# Patient Record
Sex: MALE | Race: WHITE | HISPANIC OR LATINO | Employment: OTHER | URBAN - METROPOLITAN AREA
[De-identification: names, ages, dates, MRNs, and addresses within clinical notes are randomized per-mention and may not be internally consistent; named-entity substitution may affect disease eponyms.]

---

## 2017-01-04 ENCOUNTER — RADIANT APPOINTMENT (OUTPATIENT)
Dept: GENERAL RADIOLOGY | Facility: CLINIC | Age: 27
End: 2017-01-04
Attending: FAMILY MEDICINE
Payer: COMMERCIAL

## 2017-01-04 ENCOUNTER — OFFICE VISIT (OUTPATIENT)
Dept: FAMILY MEDICINE | Facility: CLINIC | Age: 27
End: 2017-01-04
Payer: COMMERCIAL

## 2017-01-04 VITALS
SYSTOLIC BLOOD PRESSURE: 115 MMHG | TEMPERATURE: 98.4 F | HEART RATE: 84 BPM | DIASTOLIC BLOOD PRESSURE: 60 MMHG | WEIGHT: 188.5 LBS | HEIGHT: 72 IN | BODY MASS INDEX: 25.53 KG/M2 | OXYGEN SATURATION: 99 %

## 2017-01-04 DIAGNOSIS — M79.672 PAIN OF LEFT HEEL: ICD-10-CM

## 2017-01-04 DIAGNOSIS — F33.1 MAJOR DEPRESSIVE DISORDER, RECURRENT EPISODE, MODERATE (H): Primary | ICD-10-CM

## 2017-01-04 DIAGNOSIS — M76.62 ACHILLES TENDINITIS OF LEFT LOWER EXTREMITY: ICD-10-CM

## 2017-01-04 DIAGNOSIS — S99.922A FOOT INJURY, LEFT, INITIAL ENCOUNTER: ICD-10-CM

## 2017-01-04 DIAGNOSIS — M72.2 PLANTAR FASCIITIS: ICD-10-CM

## 2017-01-04 DIAGNOSIS — J45.30 UNCONTROLLED MILD PERSISTENT ASTHMA: ICD-10-CM

## 2017-01-04 PROCEDURE — 99214 OFFICE O/P EST MOD 30 MIN: CPT | Performed by: FAMILY MEDICINE

## 2017-01-04 PROCEDURE — 73630 X-RAY EXAM OF FOOT: CPT | Mod: LT

## 2017-01-04 RX ORDER — BUPROPION HYDROCHLORIDE 150 MG/1
150 TABLET ORAL EVERY MORNING
Qty: 90 TABLET | Refills: 0 | Status: SHIPPED | OUTPATIENT
Start: 2017-01-04 | End: 2017-05-26

## 2017-01-04 ASSESSMENT — ANXIETY QUESTIONNAIRES
5. BEING SO RESTLESS THAT IT IS HARD TO SIT STILL: MORE THAN HALF THE DAYS
1. FEELING NERVOUS, ANXIOUS, OR ON EDGE: NEARLY EVERY DAY
2. NOT BEING ABLE TO STOP OR CONTROL WORRYING: MORE THAN HALF THE DAYS
GAD7 TOTAL SCORE: 15
7. FEELING AFRAID AS IF SOMETHING AWFUL MIGHT HAPPEN: MORE THAN HALF THE DAYS
3. WORRYING TOO MUCH ABOUT DIFFERENT THINGS: MORE THAN HALF THE DAYS
6. BECOMING EASILY ANNOYED OR IRRITABLE: MORE THAN HALF THE DAYS
IF YOU CHECKED OFF ANY PROBLEMS ON THIS QUESTIONNAIRE, HOW DIFFICULT HAVE THESE PROBLEMS MADE IT FOR YOU TO DO YOUR WORK, TAKE CARE OF THINGS AT HOME, OR GET ALONG WITH OTHER PEOPLE: SOMEWHAT DIFFICULT

## 2017-01-04 ASSESSMENT — PATIENT HEALTH QUESTIONNAIRE - PHQ9: 5. POOR APPETITE OR OVEREATING: MORE THAN HALF THE DAYS

## 2017-01-04 NOTE — Clinical Note
My Depression Action Plan  Name: Godfrey Porras   Date of Birth 1990  Date: 1/4/2017    My doctor: Violeta Ayala   My clinic: [address] [address] [phone]          GREEN    ZONE   Good Control    What it looks like:     Things are going generally well. You have normal up s and down s. You may even feel depressed from time to time, but bad moods usually last less than a day.   What you need to do:  1. Continue to care for yourself (see self care plan)  2. Check your depression survival kit and update it as needed  3. Follow your physician s recommendations including any medication.  4. Do not stop taking medication unless you consult with your physician first.           YELLOW         ZONE Getting Worse    What it looks like:     Depression is starting to interfere with your life.     It may be hard to get out of bed; you may be starting to isolate yourself from others.    Symptoms of depression are starting to last most all day and this has happened for several days.     You may have suicidal thoughts but they are not constant.   What you need to do:     1. Call your care team, your response to treatment will improve if you keep your care team informed of your progress. Yellow periods are signs an adjustment may need to be made.     2. Continue your self-care, even if you have to fake it!    3. Talk to someone in your support network    4. Open up your depression survival kit           RED    ZONE Medical Alert - Get Help    What it looks like:     Depression is seriously interfering with your life.     You may experience these or other symptoms: You can t get out of bed most days, can t work or engage in other necessary activities, you have trouble taking care of basic hygiene, or basic responsibilities, thoughts of suicide or death that will not go away, self-injurious behavior.     What you need to do:  1. Call your care  team and request a same-day appointment. If they are not available (weekends or after hours) call your local crisis line, emergency room or 911.      Electronically signed by: Kiana Rdz, January 4, 2017    Depression Self Care Plan / Survival Kit    Self-Care for Depression  Here s the deal. Your body and mind are really not as separate as most people think.  What you do and think affects how you feel and how you feel influences what you do and think. This means if you do things that people who feel good do, it will help you feel better.  Sometimes this is all it takes.  There is also a place for medication and therapy depending on how severe your depression is, so be sure to consult with your medical provider and/ or Behavioral Health Consultant if your symptoms are worsening or not improving.     In order to better manage my stress, I will:    Exercise  Get some form of exercise, every day. This will help reduce pain and release endorphins, the  feel good  chemicals in your brain. This is almost as good as taking antidepressants!  This is not the same as joining a gym and then never going! (they count on that by the way ) It can be as simple as just going for a walk or doing some gardening, anything that will get you moving.      Hygiene   Maintain good hygiene (Get out of bed in the morning, Make your bed, Brush your teeth, Take a shower, and Get dressed like you were going to work, even if you are unemployed).  If your clothes don't fit try to get ones that do.    Diet  I will strive to eat foods that are good for me, drink plenty of water, and avoid excessive sugar, caffeine, alcohol, and other mood-altering substances.  Some foods that are helpful in depression are: complex carbohydrates, B vitamins, flaxseed, fish or fish oil, fresh fruits and vegetables.    Psychotherapy  I agree to participate in Individual Therapy (if recommended).    Medication  If prescribed medications, I agree to take them.  Missing  doses can result in serious side effects.  I understand that drinking alcohol, or other illicit drug use, may cause potential side effects.  I will not stop my medication abruptly without first discussing it with my provider.    Staying Connected With Others  I will stay in touch with my friends, family members, and my primary care provider/team.    Use your imagination  Be creative.  We all have a creative side; it doesn t matter if it s oil painting, sand castles, or mud pies! This will also kick up the endorphins.    Witness Beauty  (AKA stop and smell the roses) Take a look outside, even in mid-winter. Notice colors, textures. Watch the squirrels and birds.     Service to others  Be of service to others.  There is always someone else in need.  By helping others we can  get out of ourselves  and remember the really important things.  This also provides opportunities for practicing all the other parts of the program.    Humor  Laugh and be silly!  Adjust your TV habits for less news and crime-drama and more comedy.    Control your stress  Try breathing deep, massage therapy, biofeedback, and meditation. Find time to relax each day.     My support system    Clinic Contact:  Phone number:    Contact 1:  Phone number:    Contact 2:  Phone number:    Rastafarian/:  Phone number:    Therapist:  Phone number:    Local crisis center:    Phone number:    Other community support:  Phone number:

## 2017-01-04 NOTE — NURSING NOTE
"Chief Complaint   Patient presents with     Musculoskeletal Problem       Initial /60 mmHg  Pulse 84  Temp(Src) 98.4  F (36.9  C) (Oral)  Ht 5' 11.75\" (1.822 m)  Wt 188 lb 8 oz (85.503 kg)  BMI 25.76 kg/m2  SpO2 99% Estimated body mass index is 25.76 kg/(m^2) as calculated from the following:    Height as of this encounter: 5' 11.75\" (1.822 m).    Weight as of this encounter: 188 lb 8 oz (85.503 kg).  BP completed using cuff size: rakesh Rdz MA      "

## 2017-01-04 NOTE — Clinical Note
My Asthma Action Plan  Name: Godfrey Porras   YOB: 1990  Date: 1/4/2017   My doctor: Violeta Ayala   My clinic: [facility]      My Control Medicine: albuterol (ALBUTEROL) 108 (90 BASE) MCG/ACT inhaler       My Asthma Severity: mild persistent  Avoid your asthma triggers:         GREEN ZONE   Good Control    I feel good    No cough or wheeze    Can work, sleep and play without asthma symptoms       Take your asthma control medicine every day.     1. If exercise triggers your asthma, take your rescue medication    15 minutes before exercise or sports, and    During exercise if you have asthma symptoms  2. Spacer to use with inhaler: If you have a spacer, make sure to use it with your inhaler             YELLOW ZONE Getting Worse  I have ANY of these:    I do not feel good    Cough or wheeze    Chest feels tight    Wake up at night   1. Keep taking your Green Zone medications  2. Start taking your rescue medicine:    every 20 minutes for up to 1 hour. Then every 4 hours for 24-48 hours.  3. If you stay in the Yellow Zone for more than 12-24 hours, contact your doctor.  4. If you do not return to the Green Zone in 12-24 hours or you get worse, start taking your oral steroid medicine if prescribed by your provider.           RED ZONE Medical Alert - Get Help  I have ANY of these:    I feel awful    Medicine is not helping    Breathing getting harder    Trouble walking or talking    Nose opens wide to breathe       1. Take your rescue medicine NOW  2. If your provider has prescribed an oral steroid medicine, start taking it NOW  3. Call your doctor NOW  4. If you are still in the Red Zone after 20 minutes and you have not reached your doctor:    Take your rescue medicine again and    Call 911 or go to the emergency room right away    See your regular doctor within 2 weeks of an Emergency Room or Urgent Care visit for follow-up treatment.        The above medication may be given at  school or day care?: Yes and N/A (Adult Patient)  Child can carry and use inhaler(s) at school with approval of school nurse?: Yes and N/A (Adult Patient)    Electronically signed by: Kiana Rdz, January 4, 2017    Annual Reminders:  Meet with Asthma Educator,  Flu Shot in the Fall, consider Pneumonia Vaccination for patients with asthma (aged 19 and older).    Pharmacy:    [facility] [address] [place] [address] [address] AT [address] [facility] [place], [place] - [address]                    Asthma Triggers  How To Control Things That Make Your Asthma Worse    Triggers are things that make your asthma worse.  Look at the list below to help you find your triggers and what you can do about them.  You can help prevent asthma flare-ups by staying away from your triggers.      Trigger                                                          What you can do   Cigarette Smoke  Tobacco smoke can make asthma worse. Do not allow smoking in your home, car or around you.  Be sure no one smokes at a child s day care or school.  If you smoke, ask your health care provider for ways to help you quit.  Ask family members to quit too.  Ask your health care provider for a referral to Quit Plan to help you quit smoking, or call 1-806-700-PLAN.     Colds, Flu, Bronchitis  These are common triggers of asthma. Wash your hands often.  Don t touch your eyes, nose or mouth.  Get a flu shot every year.     Dust Mites  These are tiny bugs that live in cloth or carpet. They are too small to see. Wash sheets and blankets in hot water every week.   Encase pillows and mattress in dust mite proof covers.  Avoid having carpet if you can. If you have carpet, vacuum weekly.   Use a dust mask and HEPA vacuum.   Pollen and Outdoor Mold  Some people are allergic to trees, grass, or weed pollen, or molds. Try to keep your windows closed.  Limit time out doors when pollen count is high.   Ask you  health care provider about taking medicine during allergy season.     Animal Dander  Some people are allergic to skin flakes, urine or saliva from pets with fur or feathers. Keep pets with fur or feathers out of your home.    If you can t keep the pet outdoors, then keep the pet out of your bedroom.  Keep the bedroom door closed.  Keep pets off cloth furniture and away from stuffed toys.     Mice, Rats, and Cockroaches  Some people are allergic to the waste from these pests.   Cover food and garbage.  Clean up spills and food crumbs.  Store grease in the refrigerator.   Keep food out of the bedroom.   Indoor Mold  This can be a trigger if your home has high moisture. Fix leaking faucets, pipes, or other sources of water.   Clean moldy surfaces.  Dehumidify basement if it is damp and smelly.   Smoke, Strong Odors, and Sprays  These can reduce air quality. Stay away from strong odors and sprays, such as perfume, powder, hair spray, paints, smoke incense, paint, cleaning products, candles and new carpet.   Exercise or Sports  Some people with asthma have this trigger. Be active!  Ask your doctor about taking medicine before sports or exercise to prevent symptoms.    Warm up for 5-10 minutes before and after sports or exercise.     Other Triggers of Asthma  Cold air:  Cover your nose and mouth with a scarf.  Sometimes laughing or crying can be a trigger.  Some medicines and food can trigger asthma.

## 2017-01-04 NOTE — MR AVS SNAPSHOT
After Visit Summary   1/4/2017    Godfrey Porras    MRN: [id number]           Patient Information     Date Of Birth          1990        Visit Information        Provider Department      1/4/2017 3:40 PM Rufina Dyer MD [facility]        Today's Diagnoses     Major depressive disorder, recurrent episode, moderate (H)    -  1     Uncontrolled mild persistent asthma         Pain of left heel         Foot injury, left, initial encounter         Plantar fasciitis         Achilles tendinitis of left lower extremity           Care Instructions    Gentle stretches of foot and calf.    Ice foot and calf.  You can do ice massage using a frozen water bottle.      If not improving you should see Sports Med and/or Physical Therapy.          Follow-ups after your visit        Additional Services     PULMONARY MEDICINE REFERRAL       Your provider has referred you to: [facility]: [facility] [phone]   Or in-network pulmonary clinic near [place]  Please be aware that coverage of these services is subject to the terms and limitations of your health insurance plan.  Call member services at your health plan with any benefit or coverage questions.      Please bring the following with you to your appointment:    (1) Any X-Rays, CTs or MRIs which have been performed.  Contact the facility where they were done to arrange for  prior to your scheduled appointment.    (2) List of current medications   (3) This referral request   (4) Any documents/labs given to you for this referral                  Who to contact     If you have questions or need follow up information about today's clinic visit or your schedule please contact [facility] directly at [phone].  Normal or non-critical lab and imaging results will be communicated to you by MyChart, letter or phone within 4 business days after the clinic has received the results. If you do not hear from us  "within 7 days, please contact the clinic through [facility] or phone. If you have a critical or abnormal lab result, we will notify you by phone as soon as possible.  Submit refill requests through [facility] or call your pharmacy and they will forward the refill request to us. Please allow 3 business days for your refill to be completed.          Additional Information About Your Visit        [facility] Information     [facility] lets you send messages to your doctor, view your test results, renew your prescriptions, schedule appointments and more. To sign up, go to [email or web address] . Click on \"Log in\" on the left side of the screen, which will take you to the Welcome page. Then click on \"Sign up Now\" on the right side of the page.     You will be asked to enter the access code listed below, as well as some personal information. Please follow the directions to create your username and password.     Your access code is: [email or web address]  Expires: 2017  5:03 PM     Your access code will  in 90 days. If you need help or a new code, please call your [place] clinic or [phone].        Care EveryWhere ID     This is your Care EveryWhere ID. This could be used by other organizations to access your [place] medical records  [id number]        Your Vitals Were     Pulse Temperature Height BMI (Body Mass Index) Pulse Oximetry       84 98.4  F (36.9  C) (Oral) 5' 11.75\" (1.822 m) 25.76 kg/m2 99%        Blood Pressure from Last 3 Encounters:   17 115/60   16 102/62   16 104/62    Weight from Last 3 Encounters:   17 188 lb 8 oz (85.503 kg)   16 177 lb (80.287 kg)   16 174 lb (78.926 kg)              We Performed the Following     PULMONARY MEDICINE REFERRAL          Today's Medication Changes          These changes are accurate as of: 17  5:03 PM.  If you have any questions, ask your nurse or doctor.               These medicines have changed or have updated prescriptions.        " Dose/Directions    fluticasone 220 MCG/ACT Inhaler   Commonly known as:  FLOVENT HFA   This may have changed:  Another medication with the same name was removed. Continue taking this medication, and follow the directions you see here.   Used for:  Mild persistent asthma without complication   Changed by:  Violeta Ayala APRN CNP        Dose:  2 puff   Inhale 2 puffs into the lungs 2 times daily   Quantity:  1 Inhaler   Refills:  3            Where to get your medicines      These medications were sent to [facility] [address] [place] [address] AT [address] [address]    Hours:  24-hours Phone:  [phone]    - buPROPion 150 MG 24 hr tablet             Primary Care Provider Office Phone # Fax #    ROSA Garsia [phone]       [facility] [address]  [facility] [address]        Thank you!     Thank you for choosing [facility]  for your care. Our goal is always to provide you with excellent care. Hearing back from our patients is one way we can continue to improve our services. Please take a few minutes to complete the written survey that you may receive in the mail after your visit with us. Thank you!             Your Updated Medication List - Protect others around you: Learn how to safely use, store and throw away your medicines at www.disposemymeds.org.          This list is accurate as of: 1/4/17  5:03 PM.  Always use your most recent med list.                   Brand Name Dispense Instructions for use    albuterol 108 (90 BASE) MCG/ACT Inhaler    albuterol    1 Inhaler    Inhale 1-2 puffs into the lungs every 4 hours as needed for shortness of breath / dyspnea       buPROPion 150 MG 24 hr tablet    WELLBUTRIN XL    90 tablet    Take 1 tablet (150 mg) by mouth every morning       fluticasone 220 MCG/ACT Inhaler    FLOVENT HFA    1 Inhaler    Inhale 2 puffs  into the lungs 2 times daily

## 2017-01-04 NOTE — PROGRESS NOTES
"  SUBJECTIVE:                                                    Godfrey Porras is a 26 year old male who presents to clinic today for the following health issues:      Musculoskeletal problem/pain      Duration: 12/26/16    Description  Location: left foot    Intensity:  severe    Accompanying signs and symptoms: radiation of pain to bottom of heel to ankle    History  Previous similar problem: no  Previous evaluation:  x-ray    Precipitating or alleviating factors:  Trauma or overuse: no   Aggravating factors include: standing, walking and overuse    Therapies tried and outcome: Ibuprofen         Was jumping up while playing basketball and landed hard on left heel.  Next day went on 7 mile hike.  Now heel pain.    PROBLEMS TO ADD ON...  1) Depression.  Wants to start on Wellbutrin again.  Has used this in the past - most recently last winter.  Notes pattern of worsening mood in wintertime.  Has never used SAD lamp.   PHQ-9 SCORE 1/20/2016 2/24/2016 1/4/2017   Total Score - - -   Total Score 22 16 17     INDY-7 SCORE 1/20/2016 2/24/2016 1/4/2017   Total Score 15 8 15         2) Asthma Follow-Up  He has a longstanding history of mild persistent asthma but has not been using his flovent (controller).   Was ACT completed today?    Yes    ACT Total Scores 1/4/2017   ACT TOTAL SCORE -   ASTHMA ER VISITS -   ASTHMA HOSPITALIZATIONS -   ACT TOTAL SCORE (Goal Greater than or Equal to 20) 12   In the past 12 months, how many times did you visit the emergency room for your asthma without being admitted to the hospital? 0   In the past 12 months, how many times were you hospitalized overnight because of your asthma? 0           Problem list and histories reviewed & adjusted, as indicated.  Additional history: as documented        ROS:  PSYCH: No active SI.    OBJECTIVE:                                                    /60 mmHg  Pulse 84  Temp(Src) 98.4  F (36.9  C) (Oral)  Ht 5' 11.75\" (1.822 m)  Wt 188 lb 8 oz " (85.503 kg)  BMI 25.76 kg/m2  SpO2 99%  Body mass index is 25.76 kg/(m^2).  GEN:  no apparent distress  LUNGS:  normal respiratory effort, and lungs clear to auscultation bilaterally - no rales, rhonchi or wheezes  CV: regular rate and rhythm, normal S1 S2, no S3 or S4 and no murmur, click or rub  ANKLE:  left ankle with no swelling.  Tenderness to palpation over insertion of achilles tendon - which is intact.  No tenderness to palpation over malleoli.  FOOT:  left foot with no swelling, no warmth, no redness. no ecchymoses.  There is focal tenderness to palpation over the plantar aspect of calcaneus.  PSYCH:  Appearance/Behavior: patient is appropriately and casually dressed.  Speech:  normal  rate, rhythm, and tone.  Thought Form: organized.  Mood/Affect: depressed/flat.  Insight: fair.     Diagnostic Test Results:  FOOT LEFT THREE OR MORE VIEWS   1/4/2017 4:51 PM       HISTORY: Left foot pain.     COMPARISON: None.     FINDINGS: Negative.                                                                       IMPRESSION: Negative.     YESICA WELCH MD     ASSESSMENT/PLAN:                                                        1. Major depressive disorder, recurrent episode, moderate (H)  Will resume wellbutrin.  Discussed SAD and use of SAD lamp.  He will consider.  - buPROPion (WELLBUTRIN XL) 150 MG 24 hr tablet; Take 1 tablet (150 mg) by mouth every morning  Dispense: 90 tablet; Refill: 0    2. Uncontrolled mild persistent asthma  Discussed role of controller medicine and goal of minimizing need for rescue medication.  Recommended he see Pulmonary.  - PULMONARY MEDICINE REFERRAL    3. Pain of left heel  4. Foot injury, left, initial encounter  Reassured no fracture.   - XR Foot Left G/E 3 Views; Future    5. Plantar fasciitis  Reviewed pathophysiology and treatment.  Patient given instructions on stretching foot and posterior calf.  Reviewed stretching and icing.  Consider orthotics or night splints if more  conservative management fails.     6. Achilles tendinitis of left lower extremity  As above - rest and gentle stretching of posterior calf.  Recommended Sports Med eval is symptoms persist or worsen.      Rufina Dyer MD  [facility]

## 2017-01-04 NOTE — PATIENT INSTRUCTIONS
Gentle stretches of foot and calf.    Ice foot and calf.  You can do ice massage using a frozen water bottle.      If not improving you should see Sports Med and/or Physical Therapy.

## 2017-01-05 ASSESSMENT — ASTHMA QUESTIONNAIRES: ACT_TOTALSCORE: 12

## 2017-01-05 ASSESSMENT — PATIENT HEALTH QUESTIONNAIRE - PHQ9: SUM OF ALL RESPONSES TO PHQ QUESTIONS 1-9: 17

## 2017-01-05 ASSESSMENT — ANXIETY QUESTIONNAIRES: GAD7 TOTAL SCORE: 15

## 2017-01-19 DIAGNOSIS — J45.30 MILD PERSISTENT ASTHMA WITHOUT COMPLICATION: Primary | ICD-10-CM

## 2017-01-20 NOTE — TELEPHONE ENCOUNTER
albuterol (ALBUTEROL) 108 (90 BASE) MCG/ACT inhaler       Last Written Prescription Date: 8/20/16  Last Fill Quantity: 1 inh, # refills: 0    Last Office Visit with G, P or [facility] prescribing provider:  1/4/17   Future Office Visit:       Date of Last Asthma Action Plan Letter:   Asthma Action Plan Q1 Year    Topic Date Due     Asthma Action Plan - yearly  01/26/2017      Asthma Control Test:   ACT Total Scores 1/4/2017   ACT TOTAL SCORE -   ASTHMA ER VISITS -   ASTHMA HOSPITALIZATIONS -   ACT TOTAL SCORE (Goal Greater than or Equal to 20) 12   In the past 12 months, how many times did you visit the emergency room for your asthma without being admitted to the hospital? 0   In the past 12 months, how many times were you hospitalized overnight because of your asthma? 0       Date of Last Spirometry Test:   No results found for this or any previous visit.

## 2017-01-23 RX ORDER — ALBUTEROL SULFATE 90 UG/1
1-2 AEROSOL, METERED RESPIRATORY (INHALATION) EVERY 4 HOURS PRN
Qty: 1 INHALER | Refills: 1 | Status: SHIPPED | OUTPATIENT
Start: 2017-01-23 | End: 2017-03-12

## 2017-01-23 NOTE — TELEPHONE ENCOUNTER
From 1/4/17 ov-  2. Uncontrolled mild persistent asthma  Discussed role of controller medicine and goal of minimizing need for rescue medication.  Recommended he see Pulmonary.  - PULMONARY MEDICINE REFERRAL      Routing to most recent provider- AD.  ACT is low.  JOCELYNE Buchanan

## 2017-02-24 ENCOUNTER — TELEPHONE (OUTPATIENT)
Dept: FAMILY MEDICINE | Facility: CLINIC | Age: 27
End: 2017-02-24

## 2017-02-24 NOTE — TELEPHONE ENCOUNTER
Patient completed ACT will call back to schedule appointment with PCP to follow up, unable to schedule appointment at this time patient was on vacation and time was limited. ACT score 13.    Tamela Castellanos, CMA

## 2017-02-25 ASSESSMENT — ASTHMA QUESTIONNAIRES: ACT_TOTALSCORE: 13

## 2017-04-13 DIAGNOSIS — J45.30 MILD PERSISTENT ASTHMA WITHOUT COMPLICATION: ICD-10-CM

## 2017-04-13 NOTE — TELEPHONE ENCOUNTER
VENTOLIN  (90 BASE) MCG/ACT Inhaler       Last Written Prescription Date: 3/13/17  Last Fill Quantity: 18g, # refills: 0    Last Office Visit with [facility], P or [facility] prescribing provider:  3/17/17   Future Office Visit:       Date of Last Asthma Action Plan Letter:   Asthma Action Plan Q1 Year    Topic Date Due     Asthma Action Plan - yearly  01/26/2017      Asthma Control Test:   ACT Total Scores 2/24/2017   ACT TOTAL SCORE (Goal Greater than or Equal to 20) 13   In the past 12 months, how many times did you visit the emergency room for your asthma without being admitted to the hospital? 0   In the past 12 months, how many times were you hospitalized overnight because of your asthma? 0       Date of Last Spirometry Test:   No results found for this or any previous visit.

## 2017-04-14 NOTE — TELEPHONE ENCOUNTER
Per pt- Pt move down to [place], [place] and is seeing a different asthma doctor. Per pt request to put down the DO NOT CALL LIST.     Kiana Rdz MA

## 2017-04-18 RX ORDER — ALBUTEROL SULFATE 90 UG/1
AEROSOL, METERED RESPIRATORY (INHALATION)
Qty: 18 G | Refills: 0 | Status: SHIPPED | OUTPATIENT
Start: 2017-04-18 | End: 2017-05-26

## 2017-05-26 ENCOUNTER — OFFICE VISIT (OUTPATIENT)
Dept: FAMILY MEDICINE | Facility: CLINIC | Age: 27
End: 2017-05-26
Payer: COMMERCIAL

## 2017-05-26 VITALS
WEIGHT: 190 LBS | RESPIRATION RATE: 20 BRPM | TEMPERATURE: 98.5 F | OXYGEN SATURATION: 100 % | SYSTOLIC BLOOD PRESSURE: 109 MMHG | HEIGHT: 72 IN | HEART RATE: 80 BPM | BODY MASS INDEX: 25.73 KG/M2 | DIASTOLIC BLOOD PRESSURE: 65 MMHG

## 2017-05-26 DIAGNOSIS — F41.1 GAD (GENERALIZED ANXIETY DISORDER): ICD-10-CM

## 2017-05-26 DIAGNOSIS — F33.1 MAJOR DEPRESSIVE DISORDER, RECURRENT EPISODE, MODERATE (H): ICD-10-CM

## 2017-05-26 DIAGNOSIS — J45.30 MILD PERSISTENT ASTHMA WITHOUT COMPLICATION: ICD-10-CM

## 2017-05-26 PROCEDURE — 99214 OFFICE O/P EST MOD 30 MIN: CPT | Performed by: FAMILY MEDICINE

## 2017-05-26 RX ORDER — ALBUTEROL SULFATE 90 UG/1
2 AEROSOL, METERED RESPIRATORY (INHALATION) EVERY 4 HOURS PRN
Qty: 18 G | Refills: 1 | Status: SHIPPED | OUTPATIENT
Start: 2017-05-26 | End: 2017-08-04

## 2017-05-26 RX ORDER — BUPROPION HYDROCHLORIDE 150 MG/1
150 TABLET ORAL EVERY MORNING
Qty: 30 TABLET | Refills: 0 | Status: SHIPPED | OUTPATIENT
Start: 2017-05-26 | End: 2017-08-04

## 2017-05-26 ASSESSMENT — ANXIETY QUESTIONNAIRES
5. BEING SO RESTLESS THAT IT IS HARD TO SIT STILL: NOT AT ALL
4. TROUBLE RELAXING: NOT AT ALL
IF YOU CHECKED OFF ANY PROBLEMS ON THIS QUESTIONNAIRE, HOW DIFFICULT HAVE THESE PROBLEMS MADE IT FOR YOU TO DO YOUR WORK, TAKE CARE OF THINGS AT HOME, OR GET ALONG WITH OTHER PEOPLE: NOT DIFFICULT AT ALL
7. FEELING AFRAID AS IF SOMETHING AWFUL MIGHT HAPPEN: MORE THAN HALF THE DAYS
3. WORRYING TOO MUCH ABOUT DIFFERENT THINGS: SEVERAL DAYS
1. FEELING NERVOUS, ANXIOUS, OR ON EDGE: SEVERAL DAYS
GAD7 TOTAL SCORE: 8
6. BECOMING EASILY ANNOYED OR IRRITABLE: NEARLY EVERY DAY
2. NOT BEING ABLE TO STOP OR CONTROL WORRYING: SEVERAL DAYS

## 2017-05-26 NOTE — MR AVS SNAPSHOT
"              After Visit Summary   2017    Godfrey Porras    MRN: [id number]           Patient Information     Date Of Birth          1990        Visit Information        Provider Department      2017 12:00 PM Juanjo Ricks MD [facility]        Today's Diagnoses     Major depressive disorder, recurrent episode, moderate (H)        INDY (generalized anxiety disorder)        Mild persistent asthma without complication           Follow-ups after your visit        Who to contact     If you have questions or need follow up information about today's clinic visit or your schedule please contact [facility] directly at [phone].  Normal or non-critical lab and imaging results will be communicated to you by MyChart, letter or phone within 4 business days after the clinic has received the results. If you do not hear from us within 7 days, please contact the clinic through MyChart or phone. If you have a critical or abnormal lab result, we will notify you by phone as soon as possible.  Submit refill requests through Re.[email or web address] or call your pharmacy and they will forward the refill request to us. Please allow 3 business days for your refill to be completed.          Additional Information About Your Visit        MyChart Information     Re.[email or web address] lets you send messages to your doctor, view your test results, renew your prescriptions, schedule appointments and more. To sign up, go to [email or web address] . Click on \"Log in\" on the left side of the screen, which will take you to the Welcome page. Then click on \"Sign up Now\" on the right side of the page.     You will be asked to enter the access code listed below, as well as some personal information. Please follow the directions to create your username and password.     Your access code is: [email or web address]  Expires: 2017  2:47 PM     Your access code will  in 90 days. If you need help or a new code, please call your " [facility] or [phone].        Care EveryWhere ID     This is your Care EveryWhere ID. This could be used by other organizations to access your [place] medical records  [id number]        Your Vitals Were     Pulse Temperature Respirations Height Pulse Oximetry BMI (Body Mass Index)    80 98.5  F (36.9  C) (Oral) 20 6' (1.829 m) 100% 25.77 kg/m2       Blood Pressure from Last 3 Encounters:   05/26/17 109/65   01/04/17 115/60   05/26/16 102/62    Weight from Last 3 Encounters:   05/26/17 190 lb (86.2 kg)   01/04/17 188 lb 8 oz (85.5 kg)   05/26/16 177 lb (80.3 kg)              Today, you had the following     No orders found for display         Today's Medication Changes          These changes are accurate as of: 5/26/17  2:47 PM.  If you have any questions, ask your nurse or doctor.               These medicines have changed or have updated prescriptions.        Dose/Directions    albuterol 108 (90 BASE) MCG/ACT Inhaler   Commonly known as:  VENTOLIN HFA   This may have changed:  See the new instructions.   Used for:  Mild persistent asthma without complication   Changed by:  Juanjo Ricks MD        Dose:  2 puff   Inhale 2 puffs into the lungs every 4 hours as needed for shortness of breath / dyspnea or wheezing   Quantity:  18 g   Refills:  1            Where to get your medicines      These medications were sent to [facility] [address] [place] [address] AT [address] [address]    Hours:  24-hours Phone:  [phone]     albuterol 108 (90 BASE) MCG/ACT Inhaler    buPROPion 150 MG 24 hr tablet                Primary Care Provider    None Specified       No primary provider on file.        Thank you!     Thank you for choosing [facility]  for your care. Our goal is always to provide you with excellent care. Hearing back from our patients is one way we can continue to improve our services. Please take a  few minutes to complete the written survey that you may receive in the mail after your visit with us. Thank you!             Your Updated Medication List - Protect others around you: Learn how to safely use, store and throw away your medicines at www.disposemymeds.org.          This list is accurate as of: 5/26/17  2:47 PM.  Always use your most recent med list.                   Brand Name Dispense Instructions for use    albuterol 108 (90 BASE) MCG/ACT Inhaler    VENTOLIN HFA    18 g    Inhale 2 puffs into the lungs every 4 hours as needed for shortness of breath / dyspnea or wheezing       buPROPion 150 MG 24 hr tablet    WELLBUTRIN XL    30 tablet    Take 1 tablet (150 mg) by mouth every morning       fluticasone 220 MCG/ACT Inhaler    FLOVENT HFA    1 Inhaler    Inhale 2 puffs into the lungs 2 times daily

## 2017-05-26 NOTE — PROGRESS NOTES
SUBJECTIVE:                                                    Godfrey Porras is a 26 year old male who presents to clinic today for the following health issues:    Pt is out of albuterol inhaler. Today he ran out of albuterol inhaler. He feels that asthma is controlled, he is still using albuterol inhaler once/day prior to exercise. At times he wakes up in the morning, short of breath. He thinks that is due to allergies. For the last few days, he hasn't taken allergy medication. When he does take it, medication helps.   In August 2017, he is leaving to [place] for four months, previous to trip he will need refills.   MDD/INDY - Pt now has a job. No SI. He stopped taking Wellbutrin for one month, prior to that he was taking it for one month.    Left forms to fill out to extend insurance for 120 days.     Problem list and histories reviewed & adjusted, as indicated.  Additional history: as documented      Reviewed and updated as needed this visit by clinical staff       Reviewed and updated as needed this visit by Provider         ROS:  Constitutional, HEENT, cardiovascular, pulmonary, gi and gu systems are negative, except as otherwise noted.    OBJECTIVE:                                                    /65 (BP Location: Left arm, Patient Position: Chair, Cuff Size: Adult Large)  Pulse 80  Temp 98.5  F (36.9  C) (Oral)  Resp 20  Ht 6' (1.829 m)  Wt 190 lb (86.2 kg)  SpO2 100%  BMI 25.77 kg/m2  Body mass index is 25.77 kg/(m^2).  GENERAL: healthy, alert and no distress  EYES: Eyes grossly normal to inspection  HENT:  nose and mouth without ulcers or lesions  PSYCH: mentation appears normal, affect normal    Diagnostic Test Results:  none      ASSESSMENT/PLAN:                                                      ## Major depressive disorder, recurrent episode, moderate/INDY:  PHQ-9 SCORE 2/24/2016 1/4/2017 5/26/2017   Total Score - - -   Total Score 16 17 15     INDY-7 SCORE 2/24/2016 1/4/2017  5/26/2017   Total Score 8 15 8     - pt stopped medication, due to continued symptoms, I recommended restarting medication  - buPROPion (WELLBUTRIN XL) 150 MG 24 hr tablet; Take 1 tablet (150 mg) by mouth every morning  Dispense: 30 tablet; Refill: 0  - f/u in 2 weeks     ## Mild persistent asthma without complication  ACT Total Scores 1/4/2017 2/24/2017 5/26/2017   ACT TOTAL SCORE - - -   ASTHMA ER VISITS - - -   ASTHMA HOSPITALIZATIONS - - -   ACT TOTAL SCORE (Goal Greater than or Equal to 20) 12 13 12   In the past 12 months, how many times did you visit the emergency room for your asthma without being admitted to the hospital? 0 0 0   In the past 12 months, how many times were you hospitalized overnight because of your asthma? 0 0 0   - not controlled, refilled albuterol, repeat ACT in 1 month, if not controlled then will discuss starting controller medicaiton   - albuterol (VENTOLIN HFA) 108 (90 BASE) MCG/ACT Inhaler; Inhale 2 puffs into the lungs every 4 hours as needed for shortness of breath / dyspnea or wheezing  Dispense: 18 g; Refill: 1    Deqa Nicole Ricks MD  [facility]

## 2017-05-26 NOTE — NURSING NOTE
Chief Complaint   Patient presents with     Asthma     inhaler refill       Initial /65 (BP Location: Left arm, Patient Position: Chair, Cuff Size: Adult Large)  Pulse 80  Temp 98.5  F (36.9  C) (Oral)  Resp 20  Ht 6' (1.829 m)  Wt 190 lb (86.2 kg)  SpO2 100%  BMI 25.77 kg/m2 Estimated body mass index is 25.77 kg/(m^2) as calculated from the following:    Height as of this encounter: 6' (1.829 m).    Weight as of this encounter: 190 lb (86.2 kg).  Medication Reconciliation: incomplete     Kiana Rdz MA

## 2017-05-27 ASSESSMENT — ASTHMA QUESTIONNAIRES: ACT_TOTALSCORE: 12

## 2017-05-27 ASSESSMENT — ANXIETY QUESTIONNAIRES: GAD7 TOTAL SCORE: 8

## 2017-05-27 ASSESSMENT — PATIENT HEALTH QUESTIONNAIRE - PHQ9: SUM OF ALL RESPONSES TO PHQ QUESTIONS 1-9: 15

## 2017-06-23 ENCOUNTER — TELEPHONE (OUTPATIENT)
Dept: FAMILY MEDICINE | Facility: CLINIC | Age: 27
End: 2017-06-23

## 2017-06-27 ENCOUNTER — TELEPHONE (OUTPATIENT)
Dept: FAMILY MEDICINE | Facility: CLINIC | Age: 27
End: 2017-06-27

## 2017-06-27 NOTE — LETTER
[facility]   [address] [place]   [address]  [phone]    June 27, 2017      Godfrey Porras  [address]  [facility] [address]              Dear Diana Rodriguezmarielenahsanell,          I hope you are doing well.  Your doctor wanted me to check in with you to see how you are doing with your depression and  anxiety.  Please fill out the attached questionnaire(s) and send it back so we can assess the status of your health and prevent delays in any future refill requests.  If you do not wish to do the questionnaire, please schedule a visit with your provider.    To schedule an appointment or if you have any questions please call our clinic at [phone].              Thank you       Health Care Team

## 2017-07-11 ENCOUNTER — TELEPHONE (OUTPATIENT)
Dept: FAMILY MEDICINE | Facility: CLINIC | Age: 27
End: 2017-07-11

## 2017-08-03 PROBLEM — J45.40 MODERATE PERSISTENT ASTHMA WITHOUT COMPLICATION: Status: ACTIVE | Noted: 2017-08-03

## 2017-08-04 ENCOUNTER — OFFICE VISIT (OUTPATIENT)
Dept: FAMILY MEDICINE | Facility: CLINIC | Age: 27
End: 2017-08-04
Payer: MEDICAID

## 2017-08-04 ENCOUNTER — OFFICE VISIT (OUTPATIENT)
Dept: BEHAVIORAL HEALTH | Facility: CLINIC | Age: 27
End: 2017-08-04
Payer: MEDICAID

## 2017-08-04 VITALS
HEART RATE: 70 BPM | HEIGHT: 72 IN | TEMPERATURE: 98.6 F | SYSTOLIC BLOOD PRESSURE: 98 MMHG | DIASTOLIC BLOOD PRESSURE: 62 MMHG | WEIGHT: 190.5 LBS | OXYGEN SATURATION: 97 % | BODY MASS INDEX: 25.8 KG/M2

## 2017-08-04 DIAGNOSIS — J45.30 MILD PERSISTENT ASTHMA WITHOUT COMPLICATION: ICD-10-CM

## 2017-08-04 DIAGNOSIS — F33.1 MAJOR DEPRESSIVE DISORDER, RECURRENT EPISODE, MODERATE (H): Primary | ICD-10-CM

## 2017-08-04 DIAGNOSIS — R69 DIAGNOSIS DEFERRED: Primary | ICD-10-CM

## 2017-08-04 PROCEDURE — 99214 OFFICE O/P EST MOD 30 MIN: CPT | Performed by: FAMILY MEDICINE

## 2017-08-04 PROCEDURE — 99207 ZZC NO CHARGE LOS: CPT

## 2017-08-04 RX ORDER — ALBUTEROL SULFATE 90 UG/1
2 AEROSOL, METERED RESPIRATORY (INHALATION) EVERY 4 HOURS PRN
Qty: 54 G | Refills: 0 | Status: SHIPPED | OUTPATIENT
Start: 2017-08-04 | End: 2018-02-01

## 2017-08-04 ASSESSMENT — PATIENT HEALTH QUESTIONNAIRE - PHQ9
SUM OF ALL RESPONSES TO PHQ QUESTIONS 1-9: 20
SUM OF ALL RESPONSES TO PHQ QUESTIONS 1-9: 20
10. IF YOU CHECKED OFF ANY PROBLEMS, HOW DIFFICULT HAVE THESE PROBLEMS MADE IT FOR YOU TO DO YOUR WORK, TAKE CARE OF THINGS AT HOME, OR GET ALONG WITH OTHER PEOPLE: SOMEWHAT DIFFICULT
5. POOR APPETITE OR OVEREATING: SEVERAL DAYS

## 2017-08-04 ASSESSMENT — ANXIETY QUESTIONNAIRES
IF YOU CHECKED OFF ANY PROBLEMS ON THIS QUESTIONNAIRE, HOW DIFFICULT HAVE THESE PROBLEMS MADE IT FOR YOU TO DO YOUR WORK, TAKE CARE OF THINGS AT HOME, OR GET ALONG WITH OTHER PEOPLE: SOMEWHAT DIFFICULT
2. NOT BEING ABLE TO STOP OR CONTROL WORRYING: SEVERAL DAYS
7. FEELING AFRAID AS IF SOMETHING AWFUL MIGHT HAPPEN: SEVERAL DAYS
GAD7 TOTAL SCORE: 7
1. FEELING NERVOUS, ANXIOUS, OR ON EDGE: SEVERAL DAYS
5. BEING SO RESTLESS THAT IT IS HARD TO SIT STILL: SEVERAL DAYS
3. WORRYING TOO MUCH ABOUT DIFFERENT THINGS: SEVERAL DAYS
6. BECOMING EASILY ANNOYED OR IRRITABLE: SEVERAL DAYS

## 2017-08-04 NOTE — NURSING NOTE
"Chief Complaint   Patient presents with     Physical     pt is not fasting        Initial BP 98/62 (Cuff Size: Adult Large)  Pulse 70  Temp 98.6  F (37  C) (Oral)  Ht 5' 11.75\" (1.822 m)  Wt 190 lb 8 oz (86.4 kg)  SpO2 97%  BMI 26.02 kg/m2 Estimated body mass index is 26.02 kg/(m^2) as calculated from the following:    Height as of this encounter: 5' 11.75\" (1.822 m).    Weight as of this encounter: 190 lb 8 oz (86.4 kg).  Medication Reconciliation: complete     Malia Lane, VIKTOR      "

## 2017-08-04 NOTE — PROGRESS NOTES
[facility]  [facility]: [place]      Social Work Care Navigator Note      Patient: Godfrey Porras  Date: August 4, 2017  Preferred Name: Godfrey    Previous PHQ-9:   PHQ-9 SCORE 1/4/2017 5/26/2017 8/4/2017   Total Score - - -   Total Score MyChart - - 20 (Severe depression)   Total Score 17 15 20     Previous INDY-7:   INDY-7 SCORE 1/4/2017 5/26/2017 8/4/2017   Total Score 15 8 7     YASMANI LEVEL:  YASMANI Score (Last Two) 11/22/2011   YASMANI Raw Score 33   Activation Score 41.7   YASMANI Level 1       Preferred Contact:  No Data Recorded    Type of Contact Today: Face to Face in Clinic      Data: (subjective / Objective):    [facility] Introduction:  Hi my name is Dianna Bruce from your (name) primary care clinic.     I work closely with your primary care provider, No primary care provider on file..       SW met with pt in clinic to offer [facility] services, pcp consulted with sw stated this would be a good fit. 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        Patient response to [facility] Service offering:   Interested in enrolling in [facility] services and scheduled appt / will drop-in to complete the consent form and Brief Needs Assessment    Dianna Bruce Social Work Care Coordinator   ________________________________________________________________

## 2017-08-04 NOTE — PROGRESS NOTES
"SUBJECTIVE:   CC: Godfrey Porras is an 27 year old male who presents for preventative health visit.     Physical   Annual:     Getting at least 3 servings of Calcium per day::  Yes    Bi-annual eye exam::  Yes    Dental care twice a year::  NO    Sleep apnea or symptoms of sleep apnea::  None    Diet::  Regular (no restrictions)    Frequency of exercise::  2-3 days/week    Duration of exercise::  15-30 minutes    Taking medications regularly::  Yes    Medication side effects::  Not applicable    Additional concerns today::  No  Additional: form filled for going out of country for school.           {additional problems to add (Optional):988585}    Today's PHQ-2 Score: PHQ-2 ( 1999 [employer]) 8/4/2017   Q1: Little interest or pleasure in doing things 2   Q2: Feeling down, depressed or hopeless 2   PHQ-2 Score 4   Q1: Little interest or pleasure in doing things More than half the days   Q2: Feeling down, depressed or hopeless More than half the days   PHQ-2 Score 4       Abuse: Current or Past(Physical, Sexual or Emotional)- No  Do you feel safe in your environment - Yes    Social History   Substance Use Topics     Smoking status: Former Smoker     Packs/day: 1.00     Years: 5.00     Types: Cigarettes     Quit date: 4/20/2012     Smokeless tobacco: Never Used      Comment: 2.5 years     Alcohol use 1.0 - 1.5 oz/week     2 - 3 Standard drinks or equivalent per week      Comment: social, denies binge drinking or DWI     The patient does not drink >3 drinks per day nor >7 drinks per week.    Last PSA: No results found for: PSA    Reviewed orders with patient. Reviewed health maintenance and updated orders accordingly - Yes  {Chronicprobdata (Optional):374039}    Reviewed and updated as needed this visit by clinical staff         Reviewed and updated as needed this visit by Provider        {HISTORY OPTIONS (Optional):539849}    ROS:  {MALE ROS-adult preventive care package:502182::\"C: NEGATIVE for fever, chills, change in " "weight\",\"I: NEGATIVE for worrisome rashes, moles or lesions\",\"E: NEGATIVE for vision changes or irritation\",\"ENT: NEGATIVE for ear, mouth and throat problems\",\"R: NEGATIVE for significant cough or SOB\",\"CV: NEGATIVE for chest pain, palpitations or peripheral edema\",\"GI: NEGATIVE for nausea, abdominal pain, heartburn, or change in bowel habits\",\" male: negative for dysuria, hematuria, decreased urinary stream, erectile dysfunction, urethral discharge\",\"M: NEGATIVE for significant arthralgias or myalgia\",\"N: NEGATIVE for weakness, dizziness or paresthesias\",\"P: NEGATIVE for changes in mood or affect\"}    OBJECTIVE:   BP 98/62 (Cuff Size: Adult Large)  Pulse 70  Temp 98.6  F (37  C) (Oral)  Ht 5' 11.75\" (1.822 m)  Wt 190 lb 8 oz (86.4 kg)  SpO2 97%  BMI 26.02 kg/m2    EXAM:  {Exam Choices:052402}    ASSESSMENT/PLAN:   {Diag Picklist:606376}    COUNSELING:   {MALE COUNSELING MESSAGES:091325::\"Reviewed preventive health counseling, as reflected in patient instructions\"}    {BP Counseling- Complete if BP >= 120/80  (Optional):548759}     reports that he quit smoking about 5 years ago. His smoking use included Cigarettes. He has a 5.00 pack-year smoking history. He has never used smokeless tobacco.  {Tobacco Cessation -- Complete if patient is a smoker (Optional):781236}  Estimated body mass index is 25.77 kg/(m^2) as calculated from the following:    Height as of 5/26/17: 6' (1.829 m).    Weight as of 5/26/17: 190 lb (86.2 kg).   {Weight Management Plan (ACO) Complete if BMI is abnormal-  Ages 18-64  BMI >24.9.  Age 65+ with BMI <23 or >30 (Optional):682935}    Counseling Resources:  ATP IV Guidelines  Pooled Cohorts Equation Calculator  FRAX Risk Assessment  ICSI Preventive Guidelines  Dietary Guidelines for Americans, 2010  USDA's MyPlate  ASA Prophylaxis  Lung CA Screening    [facility]  Answers for HPI/ROS submitted by the patient on 8/4/2017   If you checked off any " problems, how difficult have these problems made it for you to do your work, take care of things at home, or get along with other people?: Somewhat difficult  PHQ9 TOTAL SCORE: 20    Conflicting answers have been found for some questions. Please document the patient's answers manually. ***    ***will be going for [employer] for 4 months - business program. Needs to go   Not currently having suicidal thoughts. No previous attempts.   Was given wellbutrin but not using it.   Not sleeping well. Working around 6 days per weeks. -----------------------------------  ***

## 2017-08-04 NOTE — MR AVS SNAPSHOT
After Visit Summary   8/4/2017    Godfrey Porras    MRN: [id number]           Patient Information     Date Of Birth          1990        Visit Information        Provider Department      8/4/2017 3:20 PM Michael Sarmiento MD [facility]        Today's Diagnoses     Major depressive disorder, recurrent episode, moderate (H)    -  1    Mild persistent asthma without complication          Care Instructions      Preventive Health Recommendations  Male Ages 26 - 39    Yearly exam:             See your health care provider every year in order to  o   Review health changes.   o   Discuss preventive care.    o   Review your medicines if your doctor has prescribed any.    You should be tested each year for STDs (sexually transmitted diseases), if you re at risk.     After age 35, talk to your provider about cholesterol testing. If you are at risk for heart disease, have your cholesterol tested at least every 5 years.     If you are at risk for diabetes, you should have a diabetes test (fasting glucose).  Shots: Get a flu shot each year. Get a tetanus shot every 10 years.     Nutrition:    Eat at least 5 servings of fruits and vegetables daily.     Eat whole-grain bread, whole-wheat pasta and brown rice instead of white grains and rice.     Talk to your provider about Calcium and Vitamin D.     Lifestyle    Exercise for at least 150 minutes a week (30 minutes a day, 5 days a week). This will help you control your weight and prevent disease.     Limit alcohol to one drink per day.     No smoking.     Wear sunscreen to prevent skin cancer.     See your dentist every six months for an exam and cleaning.             Follow-ups after your visit        Who to contact     If you have questions or need follow up information about today's clinic visit or your schedule please contact [facility] directly at [phone].  Normal or non-critical lab and imaging results will be  "communicated to you by Tararihart, letter or phone within 4 business days after the clinic has received the results. If you do not hear from us within 7 days, please contact the clinic through Lulu or phone. If you have a critical or abnormal lab result, we will notify you by phone as soon as possible.  Submit refill requests through Lulu or call your pharmacy and they will forward the refill request to us. Please allow 3 business days for your refill to be completed.          Additional Information About Your Visit        Lulu Information     Lulu lets you send messages to your doctor, view your test results, renew your prescriptions, schedule appointments and more. To sign up, go to [email or web address] . Click on \"Log in\" on the left side of the screen, which will take you to the Welcome page. Then click on \"Sign up Now\" on the right side of the page.     You will be asked to enter the access code listed below, as well as some personal information. Please follow the directions to create your username and password.     Your access code is: [email or web address]  Expires: 2017  2:47 PM     Your access code will  in 90 days. If you need help or a new code, please call your [place] clinic or [phone].        Care EveryWhere ID     This is your Care EveryWhere ID. This could be used by other organizations to access your [place] medical records  [id number]        Your Vitals Were     Pulse Temperature Height Pulse Oximetry BMI (Body Mass Index)       70 98.6  F (37  C) (Oral) 5' 11.75\" (1.822 m) 97% 26.02 kg/m2        Blood Pressure from Last 3 Encounters:   17 98/62   17 109/65   17 115/60    Weight from Last 3 Encounters:   17 190 lb 8 oz (86.4 kg)   17 190 lb (86.2 kg)   17 188 lb 8 oz (85.5 kg)              We Performed the Following     Asthma Action Plan (AAP)     DEPRESSION ACTION PLAN (DAP)          Today's Medication Changes          These changes are " accurate as of: 8/4/17 11:59 PM.  If you have any questions, ask your nurse or doctor.               Stop taking these medicines if you haven't already. Please contact your care team if you have questions.     buPROPion 150 MG 24 hr tablet   Commonly known as:  WELLBUTRIN XL   Stopped by:  Michael Sarmiento MD           fluticasone 220 MCG/ACT Inhaler   Commonly known as:  FLOVENT HFA   Stopped by:  Michael Sarmiento MD                Where to get your medicines      These medications were sent to [facility] [address]  [address], [facility] [address]     Phone:  [phone]     albuterol 108 (90 BASE) MCG/ACT Inhaler                Primary Care Provider    None Specified       No primary provider on file.        Equal Access to Services     [facility]: Hadhany Charles, wajamal lehman, rhea pricealortiz sullivan, huber falcon . [facility] [phone].    ATENCIÓN: Si habla español, tiene a chance disposición servicios gratuitos de asistencia lingüística. Terrie al [phone].    We comply with applicable federal civil rights laws and [place] laws. We do not discriminate on the basis of race, color, national origin, age, disability sex, sexual orientation or gender identity.            Thank you!     Thank you for choosing [facility]  for your care. Our goal is always to provide you with excellent care. Hearing back from our patients is one way we can continue to improve our services. Please take a few minutes to complete the written survey that you may receive in the mail after your visit with us. Thank you!             Your Updated Medication List - Protect others around you: Learn how to safely use, store and throw away your medicines at www.disposemymeds.org.          This list is accurate as of: 8/4/17 11:59 PM.  Always use your most recent med list.                   Brand Name Dispense  Instructions for use Diagnosis    albuterol 108 (90 BASE) MCG/ACT Inhaler    VENTOLIN HFA    54 g    Inhale 2 puffs into the lungs every 4 hours as needed for shortness of breath / dyspnea or wheezing    Mild persistent asthma without complication

## 2017-08-04 NOTE — LETTER
My Depression Action Plan  Name: Godfrey Porras   Date of Birth 1990  Date: 8/4/2017    My doctor: No primary care provider on file.   My clinic: [address] [address] [phone]          GREEN    ZONE   Good Control    What it looks like:     Things are going generally well. You have normal up s and down s. You may even feel depressed from time to time, but bad moods usually last less than a day.   What you need to do:  1. Continue to care for yourself (see self care plan)  2. Check your depression survival kit and update it as needed  3. Follow your physician s recommendations including any medication.  4. Do not stop taking medication unless you consult with your physician first.           YELLOW         ZONE Getting Worse    What it looks like:     Depression is starting to interfere with your life.     It may be hard to get out of bed; you may be starting to isolate yourself from others.    Symptoms of depression are starting to last most all day and this has happened for several days.     You may have suicidal thoughts but they are not constant.   What you need to do:     1. Call your care team, your response to treatment will improve if you keep your care team informed of your progress. Yellow periods are signs an adjustment may need to be made.     2. Continue your self-care, even if you have to fake it!    3. Talk to someone in your support network    4. Open up your depression survival kit           RED    ZONE Medical Alert - Get Help    What it looks like:     Depression is seriously interfering with your life.     You may experience these or other symptoms: You can t get out of bed most days, can t work or engage in other necessary activities, you have trouble taking care of basic hygiene, or basic responsibilities, thoughts of suicide or death that will not go away, self-injurious behavior.     What you need to do:  1. Call  your care team and request a same-day appointment. If they are not available (weekends or after hours) call your local crisis line, emergency room or 911.      Electronically signed by: Malia Lane, August 4, 2017    Depression Self Care Plan / Survival Kit    Self-Care for Depression  Here s the deal. Your body and mind are really not as separate as most people think.  What you do and think affects how you feel and how you feel influences what you do and think. This means if you do things that people who feel good do, it will help you feel better.  Sometimes this is all it takes.  There is also a place for medication and therapy depending on how severe your depression is, so be sure to consult with your medical provider and/ or Behavioral Health Consultant if your symptoms are worsening or not improving.     In order to better manage my stress, I will:    Exercise  Get some form of exercise, every day. This will help reduce pain and release endorphins, the  feel good  chemicals in your brain. This is almost as good as taking antidepressants!  This is not the same as joining a gym and then never going! (they count on that by the way ) It can be as simple as just going for a walk or doing some gardening, anything that will get you moving.      Hygiene   Maintain good hygiene (Get out of bed in the morning, Make your bed, Brush your teeth, Take a shower, and Get dressed like you were going to work, even if you are unemployed).  If your clothes don't fit try to get ones that do.    Diet  I will strive to eat foods that are good for me, drink plenty of water, and avoid excessive sugar, caffeine, alcohol, and other mood-altering substances.  Some foods that are helpful in depression are: complex carbohydrates, B vitamins, flaxseed, fish or fish oil, fresh fruits and vegetables.    Psychotherapy  I agree to participate in Individual Therapy (if recommended).    Medication  If prescribed medications, I agree to take  them.  Missing doses can result in serious side effects.  I understand that drinking alcohol, or other illicit drug use, may cause potential side effects.  I will not stop my medication abruptly without first discussing it with my provider.    Staying Connected With Others  I will stay in touch with my friends, family members, and my primary care provider/team.    Use your imagination  Be creative.  We all have a creative side; it doesn t matter if it s oil painting, sand castles, or mud pies! This will also kick up the endorphins.    Witness Beauty  (AKA stop and smell the roses) Take a look outside, even in mid-winter. Notice colors, textures. Watch the squirrels and birds.     Service to others  Be of service to others.  There is always someone else in need.  By helping others we can  get out of ourselves  and remember the really important things.  This also provides opportunities for practicing all the other parts of the program.    Humor  Laugh and be silly!  Adjust your TV habits for less news and crime-drama and more comedy.    Control your stress  Try breathing deep, massage therapy, biofeedback, and meditation. Find time to relax each day.     My support system    Clinic Contact:  Phone number:    Contact 1:  Phone number:    Contact 2:  Phone number:    Zoroastrianism/:  Phone number:    Therapist:  Phone number:    Local crisis center:    Phone number:    Other community support:  Phone number:

## 2017-08-05 ASSESSMENT — ANXIETY QUESTIONNAIRES: GAD7 TOTAL SCORE: 7

## 2017-08-05 ASSESSMENT — ASTHMA QUESTIONNAIRES: ACT_TOTALSCORE: 15

## 2017-08-05 ASSESSMENT — PATIENT HEALTH QUESTIONNAIRE - PHQ9: SUM OF ALL RESPONSES TO PHQ QUESTIONS 1-9: 20

## 2017-08-07 NOTE — PROGRESS NOTES
27 year old male here to fill out forms.   Initially presented for physical but feels physical is not necessary.   Will be going for [place] for 4 months - business program.   Admits mood is down. Feels it is work related stress. Working 6 days per week and not getting sleep at nigh time.   Not currently having suicidal thoughts. No previous attempts. Some passive thoughts of better off dead. No gun access. No plan or intention. Not seeing a therapist.   Was given wellbutrin but not using it.       Social History     Social History     Marital status: Single     Spouse name: N/A     Number of children: N/A     Years of education: N/A     Occupational History     student/ parking  Self Employed.     Social History Main Topics     Smoking status: Former Smoker     Packs/day: 1.00     Years: 5.00     Types: Cigarettes     Quit date: 4/20/2012     Smokeless tobacco: Never Used      Comment: 2.5 years     Alcohol use 1.0 - 1.5 oz/week     2 - 3 Standard drinks or equivalent per week      Comment: 2-3 drinks a week      Drug use: No      Comment: marijuana, occasional     Sexual activity: Yes     Partners: Female     Birth control/ protection: Pill, Condom     Other Topics Concern      Service No     Blood Transfusions No     Caffeine Concern No     Occupational Exposure No     Hobby Hazards No     Sleep Concern No     Stress Concern No     Weight Concern No     Special Diet No     Back Care No     Exercise No     Bike Helmet No     Seat Belt Yes     Self-Exams No     Parent/Sibling W/ Cabg, Mi Or Angioplasty Before 65f 55m? Yes     Maternal Grandpa- bypass heart surgery     Social History Narrative     Allergies   Allergen Reactions     Amoxicillin Rash     Patient Active Problem List   Diagnosis     ADD (attention deficit disorder) without hyperactivity     Marijuana use     Major depressive disorder, recurrent episode, moderate (H)     INDY (generalized anxiety disorder)     Moderate persistent asthma  "without complication     Reviewed medications, social history and  past medical and surgical history.    Review of system: for general, respiratory, CVS, GI and psychiatry negative except for noted above.     EXAM:  BP 98/62 (Cuff Size: Adult Large)  Pulse 70  Temp 98.6  F (37  C) (Oral)  Ht 5' 11.75\" (1.822 m)  Wt 190 lb 8 oz (86.4 kg)  SpO2 97%  BMI 26.02 kg/m2  Constitutional: healthy, alert and no distress   Psychiatric: mentation appears normal and affect normal/bright       ASSESSMENT / PLAN:   (F33.1) Major depressive disorder, recurrent episode, moderate (H)  (primary encounter diagnosis)  Comment: with passive suicidal thoughts. His forms is requesting opinion about his travel and if there is any physical or mental condition that needs evaluation.   Plan: disucsed I do feel like he should have further evaluation as I do not feel comfortable signing form while his depression is not under control, not using meds, not seeing therapist and having passive suicidal thoughts. Offered intervention. He is not too excited. 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 Filled out form stating he needs further evaluation about his mood. He agreed. Briefly seen by our  for [facility].     (J45.30) Mild persistent asthma without complication  Comment:  Refilled.   Plan: albuterol (VENTOLIN HFA) 108 (90 BASE) MCG/ACT         Inhaler                          "

## 2017-10-17 ENCOUNTER — TELEPHONE (OUTPATIENT)
Dept: FAMILY MEDICINE | Facility: CLINIC | Age: 27
End: 2017-10-17

## 2018-02-01 ENCOUNTER — OFFICE VISIT (OUTPATIENT)
Dept: FAMILY MEDICINE | Facility: CLINIC | Age: 28
End: 2018-02-01
Payer: COMMERCIAL

## 2018-02-01 VITALS
HEART RATE: 99 BPM | SYSTOLIC BLOOD PRESSURE: 108 MMHG | DIASTOLIC BLOOD PRESSURE: 64 MMHG | TEMPERATURE: 98.1 F | OXYGEN SATURATION: 100 % | BODY MASS INDEX: 26.49 KG/M2 | RESPIRATION RATE: 14 BRPM | WEIGHT: 194 LBS

## 2018-02-01 DIAGNOSIS — J45.31 MILD PERSISTENT ASTHMA WITH ACUTE EXACERBATION: Primary | ICD-10-CM

## 2018-02-01 DIAGNOSIS — Z91.09 ENVIRONMENTAL ALLERGIES: ICD-10-CM

## 2018-02-01 DIAGNOSIS — J45.30 MILD PERSISTENT ASTHMA WITHOUT COMPLICATION: ICD-10-CM

## 2018-02-01 PROBLEM — J45.40 MODERATE PERSISTENT ASTHMA WITHOUT COMPLICATION: Status: RESOLVED | Noted: 2017-08-03 | Resolved: 2018-02-01

## 2018-02-01 PROCEDURE — 82785 ASSAY OF IGE: CPT | Performed by: FAMILY MEDICINE

## 2018-02-01 PROCEDURE — 86003 ALLG SPEC IGE CRUDE XTRC EA: CPT | Performed by: FAMILY MEDICINE

## 2018-02-01 PROCEDURE — 36415 COLL VENOUS BLD VENIPUNCTURE: CPT | Performed by: FAMILY MEDICINE

## 2018-02-01 PROCEDURE — 99213 OFFICE O/P EST LOW 20 MIN: CPT | Performed by: FAMILY MEDICINE

## 2018-02-01 RX ORDER — ALBUTEROL SULFATE 90 UG/1
2 AEROSOL, METERED RESPIRATORY (INHALATION) EVERY 4 HOURS PRN
Qty: 54 G | Refills: 3 | Status: SHIPPED | OUTPATIENT
Start: 2018-02-01 | End: 2022-01-17

## 2018-02-01 RX ORDER — FLUTICASONE PROPIONATE 220 UG/1
2 AEROSOL, METERED RESPIRATORY (INHALATION) 2 TIMES DAILY
Qty: 1 INHALER | Refills: 3 | Status: SHIPPED | OUTPATIENT
Start: 2018-02-01 | End: 2022-01-17

## 2018-02-01 RX ORDER — FLUTICASONE PROPIONATE 50 MCG
1-2 SPRAY, SUSPENSION (ML) NASAL DAILY
Qty: 1 BOTTLE | Refills: 11 | Status: SHIPPED | OUTPATIENT
Start: 2018-02-01 | End: 2022-01-17

## 2018-02-01 NOTE — LETTER
February 7, 2018      Godfrey Porras  [address] [place] [address]        Dear ,    We are writing to inform you of your test results.    Hello!  It was a pleasure to see you in clinic!  Thank you for getting labs done.     Your allergy panel was positive for everything! This includes trees, grasses, pets, dust mites and mold. I'm not sure if allergy shots are an option for you given this, but it's certainly worth discussing with an allergist specialist.    Please follow up with the referrals as we discussed.    If you have any questions, please contact the clinic or schedule an appointment with me, thank you!    Resulted Orders   Respiratory disease profile   Result Value Ref Range    IGE 3114 (H) 0 - 114 KIU/L    Allergen Cat Dander 59.10 (H) <0.10 KU(A)/L      Comment:      Interp: Class v - Very High Response, Highly clinically relevant    Allergen Dog Dander 21.20 (H) <0.10 KU(A)/L      Comment:      Interp: Class IV - Very High Response, Highly clinically relevant    Allergen Bentgrass 18.40 (H) <0.10 KU(A)/L      Comment:      Interp: Class IV - Very High Response, Highly clinically relevant    Allergen Cocksfoot 15.10 (H) <0.10 KU(A)/L      Comment:      Interp: Class III - High Response, Clinically relevant    Allergen Cockroach 11.30 (H) <0.10 KU(A)/L      Comment:      Interp: Class III - High Response, Clinically relevant    Allergen D farinae 11.50 (H) <0.10 KU(A)/L      Comment:      Interp: Class III - High Response, Clinically relevant    Allergen A alternata 0.66 (H) <0.10 KU(A)/L      Comment:      Interp: Class I - Low Response, Uncertain clinical relevance    Allergen Elm 35.20 (H) <0.10 KU(A)/L      Comment:      Interp: Class IV - Very High Response, Highly clinically relevant    Allergen Maple 18.60 (H) <0.10 KU(A)/L      Comment:      Interp: Class IV - Very High Response, Highly clinically relevant    Allergen Oak(white) 20.20 (H) <0.10 KU(A)/L      Comment:       Interp: Class IV - Very High Response, Highly clinically relevant    Allergen, Silver Birch 50.80 (H) <0.10 KU(A)/L      Comment:      Interp: Class v - Very High Response, Highly clinically relevant    Allergen Marshelder 17.70 (H) <0.10 KU(A)/L      Comment:      Interp: Class IV - Very High Response, Highly clinically relevant    Allergen, Ragweed Short 19.10 (H) <0.10 KU(A)/L      Comment:      Interp: Class IV - Very High Response, Highly clinically relevant    Allergen, D Pteronyssinus 9.25 (H) <0.10 KU(A)/L      Comment:      Interp: Class III - High Response, Clinically relevant    Allergen C herbarum 0.25 (H) <0.10 KU(A)/L    Allergen A fumigatus 0.50 (H) <0.10 KU(A)/L      Comment:      Interp: Class I - Low Response, Uncertain clinical relevance       If you have any questions or concerns, please call the clinic at the number listed above.       Sincerely,        Sonia Isabel MD/nr

## 2018-02-01 NOTE — MR AVS SNAPSHOT
After Visit Summary   2/1/2018    Godfrey Porras    MRN: [id number]           Patient Information     Date Of Birth          1990        Visit Information        Provider Department      2/1/2018 3:20 PM Sonia Isabel MD [facility]        Today's Diagnoses     Mild persistent asthma without complication    -  1    Environmental allergies           Follow-ups after your visit        Additional Services     ALLERGY/ASTHMA ADULT REFERRAL       Your provider has referred you to: FMG: [facility]  [phone] [email or web address]  FMG: [facility] [phone]  [email or web address]  [facility] Allergy/Asthma-[facility] - [phone]  [email or web address]    FHN: [facility] [phone]   [email or web address]    Please be aware that coverage of these services is subject to the terms and limitations of your health insurance plan.  Call member services at your health plan with any benefit or coverage questions.      Please bring the following with you to your appointment:    (1) Any X-Rays, CTs or MRIs which have been performed.  Contact the facility where they were done to arrange for  prior to your scheduled appointment.    (2) List of current medications  (3) This referral request   (4) Any documents/labs given to you for this referral                  Who to contact     If you have questions or need follow up information about today's clinic visit or your schedule please contact [facility] directly at [phone].  Normal or non-critical lab and imaging results will be communicated to you by MyChart, letter or phone within 4 business days after the clinic has received the results. If you do not hear from us within 7 days, please contact the clinic through [employer] or  "phone. If you have a critical or abnormal lab result, we will notify you by phone as soon as possible.  Submit refill requests through [employer] or call your pharmacy and they will forward the refill request to us. Please allow 3 business days for your refill to be completed.          Additional Information About Your Visit        [employer] Information     [employer] lets you send messages to your doctor, view your test results, renew your prescriptions, schedule appointments and more. To sign up, go to [email or web address] . Click on \"Log in\" on the left side of the screen, which will take you to the Welcome page. Then click on \"Sign up Now\" on the right side of the page.     You will be asked to enter the access code listed below, as well as some personal information. Please follow the directions to create your username and password.     Your access code is: [email or web address]  Expires: 2018  3:54 PM     Your access code will  in 90 days. If you need help or a new code, please call your [place] clinic or [phone].        Care EveryWhere ID     This is your Care EveryWhere ID. This could be used by other organizations to access your [place] medical records  [id number]        Your Vitals Were     Pulse Temperature Respirations Pulse Oximetry BMI (Body Mass Index)       99 98.1  F (36.7  C) (Oral) 14 100% 26.49 kg/m2        Blood Pressure from Last 3 Encounters:   18 108/64   17 98/62   17 109/65    Weight from Last 3 Encounters:   18 194 lb (88 kg)   17 190 lb 8 oz (86.4 kg)   17 190 lb (86.2 kg)              We Performed the Following     ALLERGY/ASTHMA ADULT REFERRAL     Respiratory disease profile          Today's Medication Changes          These changes are accurate as of 18  3:54 PM.  If you have any questions, ask your nurse or doctor.               Start taking these medicines.        Dose/Directions    fluticasone 220 MCG/ACT Inhaler   Commonly known as:  " FLOVENT HFA   Used for:  Mild persistent asthma without complication   Started by:  Sonia Isabel MD        Dose:  2 puff   Inhale 2 puffs into the lungs 2 times daily   Quantity:  1 Inhaler   Refills:  3       fluticasone 50 MCG/ACT spray   Commonly known as:  FLONASE   Used for:  Environmental allergies   Started by:  Sonia Isabel MD        Dose:  1-2 spray   Spray 1-2 sprays into both nostrils daily   Quantity:  1 Bottle   Refills:  11            Where to get your medicines      These medications were sent to [facility] [address]  [address], [facility] [address]     Phone:  [phone]     albuterol 108 (90 BASE) MCG/ACT Inhaler    fluticasone 220 MCG/ACT Inhaler    fluticasone 50 MCG/ACT spray                Primary Care Provider Office Phone # Fax #    Sonia Isabel [phone]       [address]  [facility] [address]        Equal Access to Services     Sharp Mesa VistaFABIEN : Hadii aad ku hadasho Soomaali, waaxda luqadaha, qaybta kaalmada adeegyada, waxay idiin hayflorn nghia falcon . So [facility] [phone].    ATENCIÓN: Si habla español, tiene a chance disposición servicios gratuitos de asistencia lingüística. Llame al [phone].    We comply with applicable federal civil rights laws and [place] laws. We do not discriminate on the basis of race, color, national origin, age, disability, sex, sexual orientation, or gender identity.            Thank you!     Thank you for choosing [facility]  for your care. Our goal is always to provide you with excellent care. Hearing back from our patients is one way we can continue to improve our services. Please take a few minutes to complete the written survey that you may receive in the mail after your visit with us. Thank you!             Your Updated Medication List - Protect others around you: Learn how to safely use, store and throw away your medicines at  www.disposemymeds.org.          This list is accurate as of 2/1/18  3:54 PM.  Always use your most recent med list.                   Brand Name Dispense Instructions for use Diagnosis    albuterol 108 (90 BASE) MCG/ACT Inhaler    VENTOLIN HFA    54 g    Inhale 2 puffs into the lungs every 4 hours as needed for shortness of breath / dyspnea or wheezing    Mild persistent asthma without complication       fluticasone 220 MCG/ACT Inhaler    FLOVENT HFA    1 Inhaler    Inhale 2 puffs into the lungs 2 times daily    Mild persistent asthma without complication       fluticasone 50 MCG/ACT spray    FLONASE    1 Bottle    Spray 1-2 sprays into both nostrils daily    Environmental allergies

## 2018-02-01 NOTE — PROGRESS NOTES
SUBJECTIVE:   Godfrey Porras is a 27 year old male who presents to clinic today for the following health issues:      Asthma Follow-Up    Was ACT completed today?    Yes    ACT Total Scores 8/4/2017   ACT TOTAL SCORE -   ASTHMA ER VISITS -   ASTHMA HOSPITALIZATIONS -   ACT TOTAL SCORE (Goal Greater than or Equal to 20) 15   In the past 12 months, how many times did you visit the emergency room for your asthma without being admitted to the hospital? 0   In the past 12 months, how many times were you hospitalized overnight because of your asthma? 0       Recent asthma triggers that patient is dealing with: allergies  Also recent cold    He experiences allergy symptoms daily described as indoor allergies, gets a stuffy and runny nose. He knows he's allergic to cats and dogs but he has symptoms even in houses with no pets.      Amount of exercise or physical activity: None    Problems taking medications regularly: No    Medication side effects: none    Diet: regular (no restrictions)        Problem list and histories reviewed & adjusted, as indicated.  Additional history: as documented    Patient Active Problem List   Diagnosis     ADD (attention deficit disorder) without hyperactivity     Marijuana use     Major depressive disorder, recurrent episode, moderate (H)     INDY (generalized anxiety disorder)     Mild persistent asthma without complication     Past Surgical History:   Procedure Laterality Date     Left hand surgery for finger fractures         Social History   Substance Use Topics     Smoking status: Former Smoker     Packs/day: 1.00     Years: 5.00     Types: Cigarettes     Quit date: 4/20/2012     Smokeless tobacco: Never Used      Comment: 2.5 years     Alcohol use 1.0 - 1.5 oz/week     2 - 3 Standard drinks or equivalent per week      Comment: 2-3 drinks a week      Family History   Problem Relation Age of Onset     Asthma Father      Cardiovascular Paternal Grandfather          Allergies   Allergen  Reactions     Amoxicillin Rash     BP Readings from Last 3 Encounters:   02/01/18 108/64   08/04/17 98/62   05/26/17 109/65    Wt Readings from Last 3 Encounters:   02/01/18 194 lb (88 kg)   08/04/17 190 lb 8 oz (86.4 kg)   05/26/17 190 lb (86.2 kg)                    Reviewed and updated as needed this visit by clinical staff       Reviewed and updated as needed this visit by Provider         ROS:  Constitutional, HEENT, cardiovascular, pulmonary, gi and gu systems are negative, except as otherwise noted.    OBJECTIVE:     /64  Pulse 99  Temp 98.1  F (36.7  C) (Oral)  Resp 14  Wt 194 lb (88 kg)  SpO2 100%  BMI 26.49 kg/m2  Body mass index is 26.49 kg/(m^2).  GENERAL: healthy, alert and no distress  NECK: no adenopathy, no asymmetry, masses, or scars and thyroid normal to palpation  RESP: coarse breath sounds noted upper lobes without expiratory wheezes , inspiratory wheezes , prolonged expiratory phase or decreased breath sounds   CV: regular rate and rhythm, normal S1 S2, no S3 or S4, no murmur, click or rub, no peripheral edema and peripheral pulses strong  MS: no gross musculoskeletal defects noted, no edema  SKIN: no suspicious lesions or rashes and warm and dry with normal turgor  PSYCH: mentation appears normal, affect normal/bright    ASSESSMENT/PLAN:     1. Mild persistent asthma with acute exacerbation  Will assess for comorbidities (environmental allergies)  See orders below  Start high dose bid inhaled steroid, taper as symptoms improve  Continue albuterol prn    2. Mild persistent asthma without complication    - fluticasone (FLOVENT HFA) 220 MCG/ACT Inhaler; Inhale 2 puffs into the lungs 2 times daily  Dispense: 1 Inhaler; Refill: 3  - albuterol (VENTOLIN HFA) 108 (90 BASE) MCG/ACT Inhaler; Inhale 2 puffs into the lungs every 4 hours as needed for shortness of breath / dyspnea or wheezing  Dispense: 54 g; Refill: 3    3. Environmental allergies    - Respiratory disease profile  -  ALLERGY/ASTHMA ADULT REFERRAL  - fluticasone (FLONASE) 50 MCG/ACT spray; Spray 1-2 sprays into both nostrils daily  Dispense: 1 Bottle; Refill: 11    Return to clinic if symptoms worsen or progress.       Sonia Isabel MD  [facility]

## 2018-02-06 LAB
A ALTERNATA IGE QN: 0.66 KU(A)/L
A FUMIGATUS IGE QN: 0.5 KU(A)/L
C HERBARUM IGE QN: 0.25 KU(A)/L
CAT DANDER IGG QN: 59.1 KU(A)/L
COCKSFOOT IGE QN: 15.1 KU(A)/L
COMMON RAGWEED IGE QN: 19.1 KU(A)/L
D FARINAE IGE QN: 11.5 KU(A)/L
D PTERONYSS IGE QN: 9.25 KU(A)/L
DOG DANDER+EPITH IGE QN: 21.2 KU(A)/L
IGE SERPL-ACNC: 3114 KIU/L (ref 0–114)
MAPLE IGE QN: 18.6 KU(A)/L
MARSH ELDER IGE QN: 17.7 KU(A)/L
RED TOP GRASS IGE QN: 18.4 KU(A)/L
ROACH IGE QN: 11.3 KU(A)/L
SILVER BIRCH IGE QN: 50.8 KU(A)/L
WHITE ELM IGE QN: 35.2 KU(A)/L
WHITE OAK IGE QN: 20.2 KU(A)/L

## 2018-02-06 NOTE — PROGRESS NOTES
Hello!  It was a pleasure to see you in clinic!  Thank you for getting labs done.     Your allergy panel was positive for everything! This includes trees, grasses, pets, dust mites and mold. I'm not sure if allergy shots are an option for you given this, but it's certainly worth discussing with an allergist specialist.    Please follow up with the referrals as we discussed.    If you have any questions, please contact the clinic or schedule an appointment with me, thank you!    Sincerely,  Dr. Sonia Isabel MD  2/6/2018

## 2019-01-10 ENCOUNTER — OFFICE VISIT (OUTPATIENT)
Dept: URGENT CARE | Facility: URGENT CARE | Age: 29
End: 2019-01-10
Payer: COMMERCIAL

## 2019-01-10 VITALS
RESPIRATION RATE: 12 BRPM | HEART RATE: 74 BPM | OXYGEN SATURATION: 97 % | DIASTOLIC BLOOD PRESSURE: 72 MMHG | BODY MASS INDEX: 27.59 KG/M2 | TEMPERATURE: 97.8 F | WEIGHT: 202 LBS | SYSTOLIC BLOOD PRESSURE: 112 MMHG

## 2019-01-10 DIAGNOSIS — J45.31 MILD PERSISTENT ASTHMA WITH ACUTE EXACERBATION: Primary | ICD-10-CM

## 2019-01-10 DIAGNOSIS — R05.9 COUGH: ICD-10-CM

## 2019-01-10 PROCEDURE — 99214 OFFICE O/P EST MOD 30 MIN: CPT | Performed by: FAMILY MEDICINE

## 2019-01-10 RX ORDER — ALBUTEROL SULFATE 90 UG/1
2 AEROSOL, METERED RESPIRATORY (INHALATION) EVERY 6 HOURS
Qty: 1 INHALER | Refills: 0 | Status: SHIPPED | OUTPATIENT
Start: 2019-01-10 | End: 2022-01-17

## 2019-01-10 RX ORDER — PREDNISONE 20 MG/1
40 TABLET ORAL DAILY
Qty: 10 TABLET | Refills: 0 | Status: SHIPPED | OUTPATIENT
Start: 2019-01-10 | End: 2019-01-15

## 2019-01-10 RX ORDER — CODEINE PHOSPHATE AND GUAIFENESIN 10; 100 MG/5ML; MG/5ML
2 SOLUTION ORAL EVERY 4 HOURS PRN
Qty: 120 ML | Refills: 0 | Status: SHIPPED | OUTPATIENT
Start: 2019-01-10 | End: 2022-01-17

## 2019-01-10 RX ORDER — BENZONATATE 200 MG/1
200 CAPSULE ORAL 3 TIMES DAILY PRN
Qty: 30 CAPSULE | Refills: 0 | Status: SHIPPED | OUTPATIENT
Start: 2019-01-10 | End: 2022-01-17

## 2019-01-10 NOTE — PROGRESS NOTES
SUBJECTIVE:   Godfrey Porras is a 28 year old male presenting with a chief complaint of sore throat, SOB.  Patient was seen by ENT due to voice hoarseness, had scope and told that was normal, was treated with Zpak for ?respiratory infection.  Patient states that did feel better when on antibiotic but states that has gotten worse again.  No fever.  No prior mono.  Onset of symptoms was 1 month(s) ago.  Course of illness is waxing and waning.    Severity moderate  Current and Associated symptoms: cough, SOB, wheezing  Treatment measures tried include Fluids, Rest and inhaler.  Predisposing factors include HX of asthma.    Patient is a singer in a band and frustrated that his voice is still not better.    Past Medical History:   Diagnosis Date     ADD (attention deficit disorder) without hyperactivity     has been on concerta and adderall     INDY (generalized anxiety disorder)      Major depression     since about age 12-13.  Suicide attempt in [place]     Mild persistent asthma     triggers-      Current Outpatient Medications   Medication Sig Dispense Refill     albuterol (VENTOLIN HFA) 108 (90 BASE) MCG/ACT Inhaler Inhale 2 puffs into the lungs every 4 hours as needed for shortness of breath / dyspnea or wheezing 54 g 3     fluticasone (FLONASE) 50 MCG/ACT spray Spray 1-2 sprays into both nostrils daily 1 Bottle 11     fluticasone (FLOVENT HFA) 220 MCG/ACT Inhaler Inhale 2 puffs into the lungs 2 times daily 1 Inhaler 3     Social History     Tobacco Use     Smoking status: Former Smoker     Packs/day: 1.00     Years: 5.00     Pack years: 5.00     Types: Cigarettes     Last attempt to quit: 2012     Years since quittin.7     Smokeless tobacco: Never Used     Tobacco comment: 2.5 years   Substance Use Topics     Alcohol use: Yes     Alcohol/week: 1.0 - 1.5 oz     Types: 2 - 3 Standard drinks or equivalent per week     Comment: 2-3 drinks a week        ROS:  Review of systems negative except as stated  above.    OBJECTIVE:  /72   Pulse 74   Temp 97.8  F (36.6  C) (Oral)   Resp 12   Wt 91.6 kg (202 lb)   SpO2 97%   BMI 27.59 kg/m    GENERAL APPEARANCE: healthy, alert and no distress  EYES: EOMI,  PERRL, conjunctiva clear  HENT: ear canals and TM's normal.  Nose and mouth without ulcers, erythema or lesions.  No sinus tenderness  NECK: supple, nontender, no lymphadenopathy  RESP: lungs with scattered rhonchi and wheezes  CV: regular rates and rhythm, normal S1 S2, no murmur noted  Extremities: no peripheral edema or tenderness, peripheral pulses normal  SKIN: no suspicious lesions or rashes  PSYCH: mentation appears normal and affect normal/bright    ASSESSMENT/PLAN:  (J45.31) Mild persistent asthma with acute exacerbation  (primary encounter diagnosis)  Plan: predniSONE (DELTASONE) 20 MG tablet, albuterol         (PROAIR HFA/PROVENTIL HFA/VENTOLIN HFA) 108 (90        Base) MCG/ACT inhaler            (R05) Cough  Plan: benzonatate (TESSALON) 200 MG capsule,         guaiFENesin-codeine (ROBITUSSIN AC) 100-10         MG/5ML solution            Reassurance given, reviewed etiology for chronic cough, discussed that due to history of asthma that would treat worsening symptoms due to asthma flare - RX prednisone burst given.  RX albuterol inhaler given to use to help with symptoms.  RX tessalon perles and ifeoma AC given to help with cough.  Reviewed that prednisone burst will hopefully help with voice hoarseness.  Already treated with antibiotic so will not be able to screen for pertussis.  Reviewed that should restart flonase after finishes prednisone burst.    Follow up with primary provider if no improvement of symptoms in 1 week.    Jero Lynch MD

## 2019-01-10 NOTE — NURSING NOTE
"Chief Complaint   Patient presents with     Urgent Care     Breathing Problem     He thinks he has a respiratory infection adn he felt better while on the antibiotics and then it came back.  Symptoms include SOB, especially while singing.  Asthma type symptoms.  He has not had a fever.  Increased activity causes increased SOB.  this has all been going on for over a month.       Initial /72   Pulse 74   Temp 97.8  F (36.6  C) (Oral)   Resp 12   Wt 91.6 kg (202 lb)   SpO2 97%   BMI 27.59 kg/m   Estimated body mass index is 27.59 kg/m  as calculated from the following:    Height as of 8/4/17: 1.822 m (5' 11.75\").    Weight as of this encounter: 91.6 kg (202 lb)..  BP completed using cuff size: anabella Oneal R.N.    "

## 2019-01-11 NOTE — PATIENT INSTRUCTIONS
Okay to take ibuprofen 200 mg - 4 tablets (800 mg) every 8 hours as needed.  Okay to take tylenol 500 mg - 2 tablets (1000 mg) every 6-8 hours as needed, do not exceed 3000 mg in 24 hours.  Take full course of prednisone burst for asthma flare  Use flovent after prednisone burst is done  Use albuterol inhaler every 4-6 hours as needed.  Okay to take tessalon perles to help with cough during the day and robitussin with codeine at bedtime due to drowsiness.      Patient Education     Asthma (Adult)  Asthma is a disease where the medium and  small air passages within the lung go into spasm and restrict the flow of air. Inflammation and swelling of the airways cause further blockage. During an acute asthma attack, these factors cause trouble breathing, wheezing, cough and chest tightness.    An asthma attack can be triggered by many things. Common triggers include infections such as the common cold, bronchitis, and pneumonia. Irritants such as smoke or pollutants in the air, very cold air, emotional upset, and exercise can also trigger an attack. In many adults with asthma, allergies to dust, mold, pollen and animal dander can cause an asthma attack. Skipping doses of daily asthma medicine can also bring on an asthma attack.  Asthma can be controlled using the proper medicines prescribed by your healthcare provider and avoiding exposure to known triggers including allergens and irritants.  Home care    Take prescribed medicine exactly at the times advised. If you need medicine such as from a hand held inhaler or aerosol breathing machine more than every 4 hours, contact your healthcare provider or seek immediate medical attention. If prescribed an antibiotic or prednisone, take all of the medicine as prescribed, even if you are feeling better after a few days.    Don't smoke. Avoid being exposed to the smoke of others.    Some people with asthma have worsening of their symptoms when they take aspirin and non-steroidal  "or fever-reducing medicines like ibuprofen and naproxen. Talk to your healthcare provider if you think this may apply to you.  Follow-up care  Follow up with your healthcare provider, or as advised. Always bring all of your current medicines to any appointments with your healthcare provider. Also bring a complete list of medicines even those not taken for asthma. If you don't already have one, talk to your healthcare provider about developing your own \"Asthma Action Plan.\"  A pneumococcal (pneumonia) vaccine and yearly flu shot (every fall) are recommended. Ask your doctor about this.  When to seek medical advice  Call your healthcare provider right away if any of these occur:     Increased wheezing or shortness of breath    Need to use your inhalers more often than usual without relief    Fever of 100.4 F (38 C) or higher, or as directed by your healthcare provider    Coughing up lots of dark-colored or bloody sputum (mucus)    Chest pain with each breath    If you use a peak flow meter as part of an Asthma Action Plan, and you are still in the yellow zone (50% to 80%) 15 minutes after using inhaler medicine.  Call 911  Call 911 if any of the following occur    Trouble walking or talking because of shortness of breath    If you use a peak flow meter as part of an Asthma Action Plan and you are still in the red zone (less than 50%) 15 minutes after using inhaler medicine    Lips or fingernails turning gray or blue  Date Last Reviewed: 5/1/2017 2000-2018 [employer]. [address] [place] [address], [place] [address]. All rights reserved. This information is not intended as a substitute for professional medical care. Always follow your healthcare professional's instructions.           "

## 2019-03-19 DIAGNOSIS — J45.31 MILD PERSISTENT ASTHMA WITH ACUTE EXACERBATION: ICD-10-CM

## 2019-03-19 NOTE — TELEPHONE ENCOUNTER
"Requested Prescriptions   Pending Prescriptions Disp Refills     albuterol (PROAIR HFA/PROVENTIL HFA/VENTOLIN HFA) 108 (90 Base) MCG/ACT inhaler [Pharmacy Med Name: ALBUTEROL HFA INH (200 PUFFS) 18GM]  Last Written Prescription Date:  1/10/2019  Last Fill Quantity: 1,  # refills: 0   Last Office Visit: 2/1/2018   Future Office Visit:      198 g 0     Sig: INHALE 2 PUFFS BY MOUTH EVERY 4 HOURS AS NEEDED FOR SHORTNESS OF BREATH/DIFFICULTY BREATHING/WHEEZING    Asthma Maintenance Inhalers - Anticholinergics Failed - 3/19/2019 11:08 AM       Failed - Asthma control assessment score within normal limits in last 6 months    Please review ACT score.   ACT Total Scores 2/24/2017 5/26/2017 8/4/2017   ACT TOTAL SCORE - - -   ASTHMA ER VISITS - - -   ASTHMA HOSPITALIZATIONS - - -   ACT TOTAL SCORE (Goal Greater than or Equal to 20) 13 12 15   In the past 12 months, how many times did you visit the emergency room for your asthma without being admitted to the hospital? 0 0 0   In the past 12 months, how many times were you hospitalized overnight because of your asthma? 0 0 0          Passed - Patient is age 12 years or older       Passed - Medication is active on med list       Passed - Recent (6 mo) or future (30 days) visit within the authorizing provider's specialty    Patient had office visit in the last 6 months or has a visit in the next 30 days with authorizing provider or within the authorizing provider's specialty.  See \"Patient Info\" tab in inbasket, or \"Choose Columns\" in Meds & Orders section of the refill encounter.              "

## 2019-03-20 NOTE — TELEPHONE ENCOUNTER
Routing refill request to provider for review/approval because:  Last ACT 2017 and <20      Routing to [facility] - please call patient to complete ACT     Thank you,  Melecio Juarez RN

## 2019-03-21 RX ORDER — ALBUTEROL SULFATE 90 UG/1
AEROSOL, METERED RESPIRATORY (INHALATION)
Qty: 198 G | Refills: 0 | OUTPATIENT
Start: 2019-03-21

## 2019-03-21 NOTE — TELEPHONE ENCOUNTER
Phone call to Maria Luz -   Advised that patient is no longer seeing Dr. Isabel, refill needs to be routed to new primary care provider - will need to check with patient     Dr. Isabel removed from pcp per patient direction below     Isabella Lovell Registered Nurse   [facility]

## 2019-03-21 NOTE — TELEPHONE ENCOUNTER
"Comment states that \"we should not call he is no longer a pt at the  location \"    Rosalinda Oglesby MA   "

## 2022-01-10 ASSESSMENT — MIFFLIN-ST. JEOR: SCORE: 1819.01

## 2022-01-10 ASSESSMENT — PAIN SCALES - GENERAL: PAINLEVEL: NO PAIN (0)

## 2022-01-14 NOTE — PATIENT INSTRUCTIONS
At [facility], we strive to deliver an exceptional experience to you, every time we see you. If you receive a survey, please complete it as we do value your feedback.  If you have MyChart, you can expect to receive results automatically within 24 hours of their completion.  Your provider will send a note interpreting your results as well.   If you do not have MyChart, you should receive your results in about a week by mail.    Your care team:                            Family Medicine Internal Medicine   MD Arnaud Rogers MD Shantel Branch-Fleming, MD Srinivasa Vaka, MD Katya Belousova, PARosemaryC  Lynn Lange, APRN CNP    Live Blount, MD Pediatrics   Magen Dozier, PACHAI Aguayo, CNP MD Trinidad Rosa APRN CNP   MD Dora Best MD Deborah Mielke, MD Ninoska Montanez, APRN [facility]      Clinic hours: Monday - Thursday 7 am-6 pm; Fridays 7 am-5 pm.   Urgent care: Monday - Friday 10 am- 8 pm; Saturday and Sunday 9 am-5 pm.    Clinic: [phone]       [facility]: Monday - Thursday 8 am - 7 pm; Friday 8 am - 6 pm  [facility]: [phone]     Use [email or web address] for 24/7 diagnosis and treatment of dozens of conditions.    Preventive Health Recommendations  Male Ages 26 - 39    Yearly exam:             See your health care provider every year in order to  o   Review health changes.   o   Discuss preventive care.    o   Review your medicines if your doctor has prescribed any.    You should be tested each year for STDs (sexually transmitted diseases), if you re at risk.     After age 35, talk to your provider about cholesterol testing. If you are at risk for heart disease, have your cholesterol tested at least every 5 years.     If you are at risk for diabetes, you should have a diabetes test (fasting glucose).  Shots: Get a flu shot each year. Get a tetanus shot every 10 years.      Nutrition:    Eat at least 5 servings of fruits and vegetables daily.     Eat whole-grain bread, whole-wheat pasta and brown rice instead of white grains and rice.     Get adequate Calcium and Vitamin D.     Lifestyle    Exercise for at least 150 minutes a week (30 minutes a day, 5 days a week). This will help you control your weight and prevent disease.     Limit alcohol to one drink per day.     No smoking.     Wear sunscreen to prevent skin cancer.     See your dentist every six months for an exam and cleaning.

## 2022-01-17 ENCOUNTER — OFFICE VISIT (OUTPATIENT)
Dept: FAMILY MEDICINE | Facility: CLINIC | Age: 32
End: 2022-01-17
Payer: COMMERCIAL

## 2022-01-17 VITALS
TEMPERATURE: 96.8 F | OXYGEN SATURATION: 98 % | WEIGHT: 181 LBS | SYSTOLIC BLOOD PRESSURE: 118 MMHG | DIASTOLIC BLOOD PRESSURE: 75 MMHG | HEART RATE: 74 BPM | HEIGHT: 72 IN | BODY MASS INDEX: 24.52 KG/M2

## 2022-01-17 DIAGNOSIS — Z23 NEED FOR VACCINATION: ICD-10-CM

## 2022-01-17 DIAGNOSIS — Z00.00 ROUTINE GENERAL MEDICAL EXAMINATION AT A HEALTH CARE FACILITY: Primary | ICD-10-CM

## 2022-01-17 DIAGNOSIS — Z11.4 SCREENING FOR HIV (HUMAN IMMUNODEFICIENCY VIRUS): ICD-10-CM

## 2022-01-17 DIAGNOSIS — N47.1 CONGENITAL PHIMOSIS OF PENIS: ICD-10-CM

## 2022-01-17 DIAGNOSIS — F33.1 MAJOR DEPRESSIVE DISORDER, RECURRENT EPISODE, MODERATE (H): ICD-10-CM

## 2022-01-17 DIAGNOSIS — Z11.59 NEED FOR HEPATITIS C SCREENING TEST: ICD-10-CM

## 2022-01-17 DIAGNOSIS — J45.30 MILD PERSISTENT ASTHMA WITHOUT COMPLICATION: ICD-10-CM

## 2022-01-17 LAB
FASTING STATUS PATIENT QL REPORTED: YES
GLUCOSE BLD-MCNC: 93 MG/DL (ref 70–99)
HBV SURFACE AB SERPL IA-ACNC: 100.13 M[IU]/ML
HBV SURFACE AG SERPL QL IA: NONREACTIVE
HCV AB SERPL QL IA: NONREACTIVE
HIV 1+2 AB+HIV1 P24 AG SERPL QL IA: NONREACTIVE
T PALLIDUM AB SER QL: NONREACTIVE

## 2022-01-17 PROCEDURE — 90686 IIV4 VACC NO PRSV 0.5 ML IM: CPT | Performed by: FAMILY MEDICINE

## 2022-01-17 PROCEDURE — 87591 N.GONORRHOEAE DNA AMP PROB: CPT | Performed by: FAMILY MEDICINE

## 2022-01-17 PROCEDURE — 90471 IMMUNIZATION ADMIN: CPT | Performed by: FAMILY MEDICINE

## 2022-01-17 PROCEDURE — 90472 IMMUNIZATION ADMIN EACH ADD: CPT | Performed by: FAMILY MEDICINE

## 2022-01-17 PROCEDURE — 36415 COLL VENOUS BLD VENIPUNCTURE: CPT | Performed by: FAMILY MEDICINE

## 2022-01-17 PROCEDURE — 87389 HIV-1 AG W/HIV-1&-2 AB AG IA: CPT | Performed by: FAMILY MEDICINE

## 2022-01-17 PROCEDURE — 0064A COVID-19,PF,MODERNA (18+ YRS BOOSTER .25ML): CPT | Performed by: FAMILY MEDICINE

## 2022-01-17 PROCEDURE — 87491 CHLMYD TRACH DNA AMP PROBE: CPT | Performed by: FAMILY MEDICINE

## 2022-01-17 PROCEDURE — 86780 TREPONEMA PALLIDUM: CPT | Performed by: FAMILY MEDICINE

## 2022-01-17 PROCEDURE — 91306 COVID-19,PF,MODERNA (18+ YRS BOOSTER .25ML): CPT | Performed by: FAMILY MEDICINE

## 2022-01-17 PROCEDURE — 99395 PREV VISIT EST AGE 18-39: CPT | Mod: 25 | Performed by: FAMILY MEDICINE

## 2022-01-17 PROCEDURE — 86706 HEP B SURFACE ANTIBODY: CPT | Performed by: FAMILY MEDICINE

## 2022-01-17 PROCEDURE — 82947 ASSAY GLUCOSE BLOOD QUANT: CPT | Performed by: FAMILY MEDICINE

## 2022-01-17 PROCEDURE — 86803 HEPATITIS C AB TEST: CPT | Performed by: FAMILY MEDICINE

## 2022-01-17 PROCEDURE — 90632 HEPA VACCINE ADULT IM: CPT | Performed by: FAMILY MEDICINE

## 2022-01-17 PROCEDURE — 99214 OFFICE O/P EST MOD 30 MIN: CPT | Mod: 25 | Performed by: FAMILY MEDICINE

## 2022-01-17 PROCEDURE — 87340 HEPATITIS B SURFACE AG IA: CPT | Performed by: FAMILY MEDICINE

## 2022-01-17 RX ORDER — BUPROPION HYDROCHLORIDE 150 MG/1
150 TABLET ORAL EVERY MORNING
Qty: 30 TABLET | Refills: 3 | Status: SHIPPED | OUTPATIENT
Start: 2022-01-17 | End: 2022-02-07

## 2022-01-17 RX ORDER — FLUCONAZOLE 150 MG/1
150 TABLET ORAL ONCE
Qty: 1 TABLET | Refills: 0 | Status: SHIPPED | OUTPATIENT
Start: 2022-01-17 | End: 2022-01-17

## 2022-01-17 RX ORDER — FLUTICASONE PROPIONATE 220 UG/1
2 AEROSOL, METERED RESPIRATORY (INHALATION) 2 TIMES DAILY
Qty: 12 G | Refills: 5 | Status: SHIPPED | OUTPATIENT
Start: 2022-01-17 | End: 2022-06-23 | Stop reason: ALTCHOICE

## 2022-01-17 RX ORDER — ALBUTEROL SULFATE 90 UG/1
2 AEROSOL, METERED RESPIRATORY (INHALATION) EVERY 4 HOURS PRN
Qty: 8.5 G | Refills: 1 | Status: SHIPPED | OUTPATIENT
Start: 2022-01-17 | End: 2022-06-23

## 2022-01-17 ASSESSMENT — ENCOUNTER SYMPTOMS
DIARRHEA: 0
HEMATURIA: 0
FREQUENCY: 0
HEMATOCHEZIA: 0
HEADACHES: 0
FEVER: 0
EYE PAIN: 0
WEAKNESS: 1
PALPITATIONS: 0
SHORTNESS OF BREATH: 0
HEARTBURN: 0
CONSTIPATION: 0
JOINT SWELLING: 0
NERVOUS/ANXIOUS: 0
ARTHRALGIAS: 0
NAUSEA: 0
CHILLS: 0
DYSURIA: 1
MYALGIAS: 0
PARESTHESIAS: 0
ABDOMINAL PAIN: 0
COUGH: 0
DIZZINESS: 0
SORE THROAT: 0

## 2022-01-17 ASSESSMENT — PATIENT HEALTH QUESTIONNAIRE - PHQ9
SUM OF ALL RESPONSES TO PHQ QUESTIONS 1-9: 16
SUM OF ALL RESPONSES TO PHQ QUESTIONS 1-9: 16
10. IF YOU CHECKED OFF ANY PROBLEMS, HOW DIFFICULT HAVE THESE PROBLEMS MADE IT FOR YOU TO DO YOUR WORK, TAKE CARE OF THINGS AT HOME, OR GET ALONG WITH OTHER PEOPLE: VERY DIFFICULT

## 2022-01-17 NOTE — PROGRESS NOTES
SUBJECTIVE:   CC: Godfrey Porras is an 31 year old male who presents for preventative health visit. He also has some ongoing concerns for which he thinks he needs referrals.      Patient has been advised of split billing requirements and indicates understanding: Yes  Healthy Habits:     Getting at least 3 servings of Calcium per day:  NO    Bi-annual eye exam:  NO    Dental care twice a year:  NO    Sleep apnea or symptoms of sleep apnea:  Daytime drowsiness    Diet:  Regular (no restrictions)    Frequency of exercise:  None    Taking medications regularly:  Yes    Barriers to taking medications:  None    Medication side effects:  Not applicable    PHQ-2 Total Score: 4    Additional concerns today:  Yes      Abnormal Mood Symptoms      Duration: Up to a year    Description:  Depression: YES  Anxiety: no   Panic attacks: no      Accompanying signs and symptoms: see PHQ-9 and INDY scores    History (similar episodes/previous evaluation): Remotely, more than a year ago, he has been on medication in the past    Precipitating or alleviating factors: None    Therapies tried and outcome: none      Today's PHQ-2 Score:   PHQ-2 ( 1999 Pf[employer]) 1/17/2022   Q1: Little interest or pleasure in doing things 2   Q2: Feeling down, depressed or hopeless 2   PHQ-2 Score 4   Q1: Little interest or pleasure in doing things More than half the days   Q2: Feeling down, depressed or hopeless More than half the days   PHQ-2 Score 4       Abuse: Current or Past(Physical, Sexual or Emotional)- No  Do you feel safe in your environment? Yes    Have you ever done Advance Care Planning? (For example, a Health Directive, POLST, or a discussion with a medical provider or your loved ones about your wishes): No, advance care planning information given to patient to review.  Patient declined advance care planning discussion at this time.    Social History     Tobacco Use     Smoking status: Former Smoker     Packs/day: 1.00     Years: 5.00     Pack  years: 5.00     Types: Cigarettes     Quit date: 2012     Years since quittin.7     Smokeless tobacco: Never Used     Tobacco comment: 2.5 years   Substance Use Topics     Alcohol use: Yes     Alcohol/week: 1.7 - 2.5 standard drinks     Types: 2 - 3 Standard drinks or equivalent per week     Comment: 2-3 drinks a week          Alcohol Use 2022   Prescreen: >3 drinks/day or >7 drinks/week? No   Prescreen: >3 drinks/day or >7 drinks/week? -       Past medical, family, and social histories, medications, and allergies are reviewed and updated in [facility].     Review of Systems   Constitutional: Negative for chills and fever.   HENT: Negative for congestion, ear pain, hearing loss and sore throat.    Eyes: Negative for pain and visual disturbance.   Respiratory: Negative for cough and shortness of breath.    Cardiovascular: Negative for chest pain, palpitations and peripheral edema.   Gastrointestinal: Negative for abdominal pain, constipation, diarrhea, heartburn, hematochezia and nausea.   Genitourinary: Positive for dysuria. Negative for frequency, genital sores, hematuria and urgency.   Musculoskeletal: Negative for arthralgias, joint swelling and myalgias.   Skin: Negative for rash.   Neurological: Positive for weakness. Negative for dizziness, headaches and paresthesias.   Psychiatric/Behavioral: Negative for mood changes. The patient is not nervous/anxious.      Off-and-on (years) problem of itching from presumed yeast infection under foreskin not treated lately.  Wants to resume treatment for asthma and resume treatment for ADHD and depression. Depressive symptoms worsened over (approx) the past year. Not suicidal.    OBJECTIVE:   /75 (BP Location: Left arm, Patient Position: Sitting, Cuff Size: Adult Large)   Pulse 74   Temp 96.8  F (36  C) (Tympanic)   Ht 1.829 m (6')   Wt 82.1 kg (181 lb)   SpO2 98%   BMI 24.55 kg/m      Physical Exam  GENERAL: healthy, alert and no distress  EYES: Eyes  grossly normal to inspection, PERRL and conjunctivae and sclerae normal  HENT: ear canals and TM's normal, nose and mouth without ulcers or lesions  NECK: no adenopathy, no asymmetry, masses, or scars and thyroid normal to palpation  RESP: lungs clear to auscultation - no rales, rhonchi or wheezes  CV: regular rate and rhythm, normal S1 S2, no S3 or S4, no murmur, click or rub, no peripheral edema and peripheral pulses strong  ABDOMEN: soft, nontender, no hepatosplenomegaly, no masses and bowel sounds normal   (male): Uncircumcised male genitalia with phimosis, without lesions or urethral discharge, no hernia  MS: no gross musculoskeletal defects noted, no edema  SKIN: no suspicious lesions or rashes  NEURO: Normal strength and tone, mentation intact and speech normal  PSYCH: mentation appears normal, affect normal/bright        ASSESSMENT/PLAN:   (Z00.00) Routine general medical examination at a health care facility  (primary encounter diagnosis)  Comment:   Plan: HIV Antigen Antibody Combo, Hepatitis C Screen         Reflex to HCV RNA Quant and Genotype, INFLUENZA        VACCINE IM > 6 MONTHS VALENT IIV4         (AFLURIA/FLUZONE), COVID-19,PF,MODERNA (18+ YRS        BOOSTER .25ML), Glucose, NEISSERIA GONORRHOEA         PCR, CHLAMYDIA TRACHOMATIS PCR, Treponema Abs w        Reflex to RPR and Titer, HEPATITIS A VACCINE         (ADULT), Hepatitis B surface antigen, Hepatitis        B Surface Antibody        Follow-up in 1 year    (F33.1) Major depressive disorder, recurrent episode, moderate (H)  Comment:   Plan: Adult Mental Health Referral,         buPROPion (WELLBUTRIN XL) 150 MG 24 hr tablet        He can address the ADHD issue at his mental health referral as well.   Return in about 6 weeks (around 2/28/2022) for recheck medications.      (J45.30) Mild persistent asthma without complication  Comment: Off medications for a long period of time  Plan: fluticasone (FLOVENT HFA) 220 MCG/ACT inhaler,          albuterol (PROAIR HFA/PROVENTIL HFA/VENTOLIN         HFA) 108 (90 Base) MCG/ACT inhaler, Asthma         Action Plan (AAP)         Return in about 6 weeks (around 2022) for recheck medications.      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.1) Congenital phimosis of penis  Comment: The patient may be interested in a circumcision  Plan: Adult Urology Referral, fluconazole (DIFLUCAN)         150 MG tablet, Glucose            (Z11.4) Screening for HIV (human immunodeficiency virus)  Comment: indications for screening discussed with the patient   Plan: HIV Antigen Antibody Combo            (Z11.59) Need for hepatitis C screening test  Comment: indications for screening discussed with the patient   Plan: Hepatitis C Screen Reflex to HCV RNA Quant and         Genotype            (Z23) Need for vaccination  Comment:   Plan: INFLUENZA VACCINE IM > 6 MONTHS VALENT IIV4         (AFLURIA/FLUZONE), COVID-19,PF,MODERNA (18+ YRS        BOOSTER .25ML), HEPATITIS A VACCINE (ADULT)                COUNSELING:   Reviewed preventive health counseling, as reflected in patient instructions  Special attention given to:        Regular exercise       Immunizations    Vaccinated for: COVID-19, Hepatitis A and Influenza             Consider Hep C screening for all patients one time for ages 18-79 years       HIV screeninx in teen years, 1x in adult years, and at intervals if high risk    Estimated body mass index is 24.55 kg/m  as calculated from the following:    Height as of this encounter: 1.829 m (6').    Weight as of this encounter: 82.1 kg (181 lb).         He reports that he quit smoking about 9 years ago. His smoking use included cigarettes. He has a 5.00 pack-year smoking history. He has never used smokeless tobacco.      Counseling Resources:  ATP IV Guidelines  Pooled Cohorts Equation Calculator  FRAX Risk Assessment  ICSI Preventive Guidelines  Dietary Guidelines for Americans, 2010  USDA's MyPlate  ASA Prophylaxis  Lung CA Screening    Jai Noriega,  MD  [facility]      Answers for HPI/ROS submitted by the patient on 1/17/2022  If you checked off any problems, how difficult have these problems made it for you to do your work, take care of things at home, or get along with other people?: Very difficult  PHQ9 TOTAL SCORE: 16

## 2022-01-17 NOTE — NURSING NOTE
Prior to immunization administration, verified patients identity using patient s name and date of birth. Please see Immunization Activity for additional information.     Screening Questionnaire for Adult Immunization    Are you sick today?   No   Do you have allergies to medications, food, a vaccine component or latex?   Yes   Have you ever had a serious reaction after receiving a vaccination?   No   Do you have a long-term health problem with heart, lung, kidney, or metabolic disease (e.g., diabetes), asthma, a blood disorder, no spleen, complement component deficiency, a cochlear implant, or a spinal fluid leak?  Are you on long-term aspirin therapy?   Yes   Do you have cancer, leukemia, HIV/AIDS, or any other immune system problem?   No   Do you have a parent, brother, or sister with an immune system problem?   No   In the past 3 months, have you taken medications that affect  your immune system, such as prednisone, other steroids, or anticancer drugs; drugs for the treatment of rheumatoid arthritis, Crohn s disease, or psoriasis; or have you had radiation treatments?   No   Have you had a seizure, or a brain or other nervous system problem?   No   During the past year, have you received a transfusion of blood or blood    products, or been given immune (gamma) globulin or antiviral drug?   No   For women: Are you pregnant or is there a chance you could become       pregnant during the next month?   No   Have you received any vaccinations in the past 4 weeks?   No     Immunization questionnaire was positive for at least one answer.  Notified Leann.        Per orders of Dr. paz, injection of influenza, hepatitis A, Moderna  given by Diane L. Schoenherr, RN. Patient instructed to remain in clinic for 15 minutes afterwards, and to report any adverse reaction to me immediately.       Screening performed by Diane L. Schoenherr, RN on 1/17/2022 at 9:27 AM.

## 2022-01-18 LAB
C TRACH DNA SPEC QL NAA+PROBE: NEGATIVE
N GONORRHOEA DNA SPEC QL NAA+PROBE: NEGATIVE

## 2022-01-18 ASSESSMENT — PATIENT HEALTH QUESTIONNAIRE - PHQ9: SUM OF ALL RESPONSES TO PHQ QUESTIONS 1-9: 16

## 2022-02-07 ENCOUNTER — OFFICE VISIT (OUTPATIENT)
Dept: FAMILY MEDICINE | Facility: CLINIC | Age: 32
End: 2022-02-07
Payer: COMMERCIAL

## 2022-02-07 VITALS
RESPIRATION RATE: 16 BRPM | OXYGEN SATURATION: 100 % | HEART RATE: 77 BPM | BODY MASS INDEX: 24.49 KG/M2 | DIASTOLIC BLOOD PRESSURE: 75 MMHG | TEMPERATURE: 97.9 F | SYSTOLIC BLOOD PRESSURE: 124 MMHG | WEIGHT: 180.6 LBS

## 2022-02-07 DIAGNOSIS — F33.1 MAJOR DEPRESSIVE DISORDER, RECURRENT EPISODE, MODERATE (H): ICD-10-CM

## 2022-02-07 DIAGNOSIS — J45.30 MILD PERSISTENT ASTHMA WITHOUT COMPLICATION: Primary | ICD-10-CM

## 2022-02-07 PROCEDURE — 99213 OFFICE O/P EST LOW 20 MIN: CPT | Performed by: FAMILY MEDICINE

## 2022-02-07 RX ORDER — BUPROPION HYDROCHLORIDE 300 MG/1
300 TABLET ORAL EVERY MORNING
Qty: 30 TABLET | Refills: 2 | Status: SHIPPED | OUTPATIENT
Start: 2022-02-07 | End: 2022-06-23

## 2022-02-07 ASSESSMENT — ANXIETY QUESTIONNAIRES
1. FEELING NERVOUS, ANXIOUS, OR ON EDGE: SEVERAL DAYS
IF YOU CHECKED OFF ANY PROBLEMS ON THIS QUESTIONNAIRE, HOW DIFFICULT HAVE THESE PROBLEMS MADE IT FOR YOU TO DO YOUR WORK, TAKE CARE OF THINGS AT HOME, OR GET ALONG WITH OTHER PEOPLE: SOMEWHAT DIFFICULT
5. BEING SO RESTLESS THAT IT IS HARD TO SIT STILL: SEVERAL DAYS
3. WORRYING TOO MUCH ABOUT DIFFERENT THINGS: SEVERAL DAYS
6. BECOMING EASILY ANNOYED OR IRRITABLE: NEARLY EVERY DAY
2. NOT BEING ABLE TO STOP OR CONTROL WORRYING: SEVERAL DAYS
7. FEELING AFRAID AS IF SOMETHING AWFUL MIGHT HAPPEN: MORE THAN HALF THE DAYS
GAD7 TOTAL SCORE: 10

## 2022-02-07 ASSESSMENT — PATIENT HEALTH QUESTIONNAIRE - PHQ9
SUM OF ALL RESPONSES TO PHQ QUESTIONS 1-9: 12
5. POOR APPETITE OR OVEREATING: SEVERAL DAYS

## 2022-02-07 ASSESSMENT — ASTHMA QUESTIONNAIRES: ACT_TOTALSCORE: 12

## 2022-02-07 ASSESSMENT — PAIN SCALES - GENERAL: PAINLEVEL: NO PAIN (0)

## 2022-02-07 NOTE — PROGRESS NOTES
Assessment & Plan     (J45.30) Mild persistent asthma without complication  (primary encounter diagnosis)  Comment: Flovent refilled last month  Plan: consider adding LABA, ie change to Advair.  For now, pt wants to see if he can use less albuterol, seems to him that that sometimes it is used by habit rather than for symptoms.  [place]    (F33.1) Major depressive disorder, recurrent episode, moderate (H)  Comment:   Plan: buPROPion (WELLBUTRIN XL) 300 MG 24 hr tablet        Increase bupropion to 300 mg.  [place]        24 minutes spent on the date of the encounter doing chart review, patient visit and documentation       Jai Noriega MD  [facility]  . Increase bupropion to 300}  Jordan Donahue is a 31 year old who presents for the following health issues     HPI     Depression and Anxiety Follow-Up    How are you doing with your depression since your last visit? Improved     How are you doing with your anxiety since your last visit?  Not sure, no worse    Are you having other symptoms that might be associated with depression or anxiety? No    Have you had a significant life event? No     Do you have any concerns with your use of alcohol or other drugs? No    Social History     Tobacco Use     Smoking status: Former Smoker     Packs/day: 1.00     Years: 5.00     Pack years: 5.00     Types: Cigarettes     Quit date: 2012     Years since quittin.8     Smokeless tobacco: Never Used     Tobacco comment: 2.5 years   Substance Use Topics     Alcohol use: Yes     Alcohol/week: 4.0 - 6.0 standard drinks     Types: 4 - 6 Standard drinks or equivalent per week     Comment: Fri and Sat     Drug use: Yes     Frequency: 3.0 times per week     Types: Marijuana     Comment: 2-3     PHQ 2017   PHQ-9 Total Score - 16 12   Q9: Thoughts of better off dead/self-harm past 2 weeks Several days Several days Not at all   F/U: Thoughts of suicide or self-harm - Yes -   F/U:  Self harm-plan - No -   F/U: Self-harm action - No -   F/U: Safety concerns - No -     INDY-7 SCORE 5/26/2017 8/4/2017 2/7/2022   Total Score 8 7 10         Suicide Assessment Five-step Evaluation and Treatment (SAFE-T)    Asthma Follow-Up    Was ACT completed today?    Yes    ACT Total Scores 2/7/2022   ACT TOTAL SCORE -   ASTHMA ER VISITS -   ASTHMA HOSPITALIZATIONS -   ACT TOTAL SCORE (Goal Greater than or Equal to 20) 12   In the past 12 months, how many times did you visit the emergency room for your asthma without being admitted to the hospital? 0   In the past 12 months, how many times were you hospitalized overnight because of your asthma? 0         How many days per week do you miss taking your asthma controller medication?  Once in the past 3 weeks of taking it (didn't have with him staying overnight somewhere else)    Please describe any recent triggers for your asthma: None    Have you had any Emergency Room Visits, Urgent Care Visits, or Hospital Admissions since your last office visit?  No          Review of Systems         Objective    /75 (BP Location: Left arm, Patient Position: Chair, Cuff Size: Adult Regular)   Pulse 77   Temp 97.9  F (36.6  C) (Tympanic)   Resp 16   Wt 81.9 kg (180 lb 9.6 oz)   SpO2 100%   BMI 24.49 kg/m    Body mass index is 24.49 kg/m .  Physical Exam   GENERAL: healthy, alert and no distress  EYES: Eyes grossly normal to inspection, PERRL, EOMI, sclerae white and conjunctivae normal  RESP: lungs clear to auscultation - no crackles or wheezes, no areas of dullness, no tachypnea  CV: Heart regular rate and rhythm without murmur, click or rub. No peripheral edema and peripheral pulses strong  MS: no gross musculoskeletal defects noted, no edema  SKIN: no suspicious lesions or rashes to visible skin  NEURO: Normal strength and tone, sensory exam grossly normal, mentation intact, oriented times 3 and cranial nerves 2-12 intact  PSYCH: mentation appears normal, affect  normal/bright

## 2022-02-07 NOTE — PROGRESS NOTES
"    {PROVIDER CHARTING PREFERENCE:533065}    Jordan Donahue is a 31 year old who presents for the following health issues {ACCOMPANIED BY STATEMENT (Optional):825506}    HPI     Depression and Anxiety Follow-Up    How are you doing with your depression since your last visit? { :129110::\"No change\"}    How are you doing with your anxiety since your last visit?  { :856748::\"No change\"}    Are you having other symptoms that might be associated with depression or anxiety? { :023571}    Have you had a significant life event? { :099440}     Do you have any concerns with your use of alcohol or other drugs? { :623608}    Social History     Tobacco Use     Smoking status: Former Smoker     Packs/day: 1.00     Years: 5.00     Pack years: 5.00     Types: Cigarettes     Quit date: 2012     Years since quittin.8     Smokeless tobacco: Never Used     Tobacco comment: 2.5 years   Substance Use Topics     Alcohol use: Yes     Alcohol/week: 4.0 - 6.0 standard drinks     Types: 4 - 6 Standard drinks or equivalent per week     Comment: Fri and Sat     Drug use: Yes     Frequency: 3.0 times per week     Types: Marijuana     Comment: 2-3     PHQ 2017   PHQ-9 Total Score 20 - 16   Q9: Thoughts of better off dead/self-harm past 2 weeks Several days Several days Several days   F/U: Thoughts of suicide or self-harm - - Yes   F/U: Self harm-plan - - No   F/U: Self-harm action - - No   F/U: Safety concerns - - No     INDY-7 SCORE 2017   Total Score 15 8 7     {Last PHQ9 or GAD7 Responses (Optional):832809}    Suicide Assessment Five-step Evaluation and Treatment (SAFE-T)  {Provider  Link to Depression Care Package SmartSet :751717}    How many servings of fruits and vegetables do you eat daily?  { :889036}    On average, how many sweetened beverages do you drink each day (Examples: soda, juice, sweet tea, etc.  Do NOT count diet or artificially sweetened beverages)?   { " 1-11:323816}    How many days per week do you exercise enough to make your heart beat faster? { :616202}    How many minutes a day do you exercise enough to make your heart beat faster? { :172585}    How many days per week do you miss taking your medication? {0-7 :158248}    {additonal problems for provider to add (Optional):174246}    Review of Systems   {ROS COMP (Optional):700119}      Objective    There were no vitals taken for this visit.  There is no height or weight on file to calculate BMI.  Physical Exam   {Exam List (Optional):606242}    {Diagnostic Test Results (Optional):824930}    {AMBULATORY ATTESTATION (Optional):366898}

## 2022-02-08 ASSESSMENT — ANXIETY QUESTIONNAIRES: GAD7 TOTAL SCORE: 10

## 2022-03-18 ENCOUNTER — TELEPHONE (OUTPATIENT)
Dept: BEHAVIORAL HEALTH | Facility: CLINIC | Age: 32
End: 2022-03-18
Payer: COMMERCIAL

## 2022-03-18 NOTE — TELEPHONE ENCOUNTER
Spoke with pt to remind them about their video visit on Tuesday 3/22/22 at 11 am, that will be connect through via My Chart.

## 2022-03-22 ENCOUNTER — VIRTUAL VISIT (OUTPATIENT)
Dept: PSYCHOLOGY | Facility: CLINIC | Age: 32
End: 2022-03-22
Attending: FAMILY MEDICINE
Payer: COMMERCIAL

## 2022-03-22 DIAGNOSIS — Z13.39 ATTENTION DEFICIT HYPERACTIVITY DISORDER (ADHD) EVALUATION: ICD-10-CM

## 2022-03-22 DIAGNOSIS — F33.1 MAJOR DEPRESSIVE DISORDER, RECURRENT EPISODE, MODERATE (H): Primary | ICD-10-CM

## 2022-03-22 DIAGNOSIS — F41.1 GAD (GENERALIZED ANXIETY DISORDER): ICD-10-CM

## 2022-03-22 PROCEDURE — 90837 PSYTX W PT 60 MINUTES: CPT | Mod: 95 | Performed by: PSYCHOLOGIST

## 2022-03-22 ASSESSMENT — COLUMBIA-SUICIDE SEVERITY RATING SCALE - C-SSRS
2. HAVE YOU ACTUALLY HAD ANY THOUGHTS OF KILLING YOURSELF IN THE PAST MONTH?: NO
1. IN THE PAST MONTH, HAVE YOU WISHED YOU WERE DEAD OR WISHED YOU COULD GO TO SLEEP AND NOT WAKE UP?: NO
5. HAVE YOU STARTED TO WORK OUT OR WORKED OUT THE DETAILS OF HOW TO KILL YOURSELF? DO YOU INTEND TO CARRY OUT THIS PLAN?: NO
4. HAVE YOU HAD THESE THOUGHTS AND HAD SOME INTENTION OF ACTING ON THEM?: NO
3. HAVE YOU BEEN THINKING ABOUT HOW YOU MIGHT KILL YOURSELF?: NO
6. HAVE YOU EVER DONE ANYTHING, STARTED TO DO ANYTHING, OR PREPARED TO DO ANYTHING TO END YOUR LIFE?: NO

## 2022-03-22 ASSESSMENT — PATIENT HEALTH QUESTIONNAIRE - PHQ9
10. IF YOU CHECKED OFF ANY PROBLEMS, HOW DIFFICULT HAVE THESE PROBLEMS MADE IT FOR YOU TO DO YOUR WORK, TAKE CARE OF THINGS AT HOME, OR GET ALONG WITH OTHER PEOPLE: VERY DIFFICULT
SUM OF ALL RESPONSES TO PHQ QUESTIONS 1-9: 19
SUM OF ALL RESPONSES TO PHQ QUESTIONS 1-9: 19

## 2022-03-22 ASSESSMENT — ANXIETY QUESTIONNAIRES
3. WORRYING TOO MUCH ABOUT DIFFERENT THINGS: MORE THAN HALF THE DAYS
6. BECOMING EASILY ANNOYED OR IRRITABLE: MORE THAN HALF THE DAYS
2. NOT BEING ABLE TO STOP OR CONTROL WORRYING: MORE THAN HALF THE DAYS
GAD7 TOTAL SCORE: 14
1. FEELING NERVOUS, ANXIOUS, OR ON EDGE: NEARLY EVERY DAY
4. TROUBLE RELAXING: SEVERAL DAYS
6. BECOMING EASILY ANNOYED OR IRRITABLE: MORE THAN HALF THE DAYS
7. FEELING AFRAID AS IF SOMETHING AWFUL MIGHT HAPPEN: NEARLY EVERY DAY
GAD7 TOTAL SCORE: 14
7. FEELING AFRAID AS IF SOMETHING AWFUL MIGHT HAPPEN: NEARLY EVERY DAY
2. NOT BEING ABLE TO STOP OR CONTROL WORRYING: MORE THAN HALF THE DAYS
GAD7 TOTAL SCORE: 14
5. BEING SO RESTLESS THAT IT IS HARD TO SIT STILL: SEVERAL DAYS
GAD7 TOTAL SCORE: 14
GAD7 TOTAL SCORE: 14
1. FEELING NERVOUS, ANXIOUS, OR ON EDGE: NEARLY EVERY DAY
GAD7 TOTAL SCORE: 14
7. FEELING AFRAID AS IF SOMETHING AWFUL MIGHT HAPPEN: NEARLY EVERY DAY
3. WORRYING TOO MUCH ABOUT DIFFERENT THINGS: MORE THAN HALF THE DAYS
7. FEELING AFRAID AS IF SOMETHING AWFUL MIGHT HAPPEN: NEARLY EVERY DAY
4. TROUBLE RELAXING: SEVERAL DAYS
5. BEING SO RESTLESS THAT IT IS HARD TO SIT STILL: SEVERAL DAYS

## 2022-03-22 NOTE — PROGRESS NOTES
[facility]   Mental Health & Addiction Services     Progress Note - Initial Visit    Patient  Name:  Godfrey Porras Date: 3/22/2022         Service Type: Individual     Visit Start Time: 110 Visit End Time: 115    Visit #: 1    Attendees: Client    Service Modality:  Video Visit:      Provider verified identity through the following two step process.  Patient provided:  Patient     Telemedicine Visit: The patient's condition can be safely assessed and treated via synchronous audio and visual telemedicine encounter.      Reason for Telemedicine Visit: Services only offered telehealth    Originating Site (Patient Location): Patient's home    Distant Site (Provider Location): Provider Remote Setting- Home Office    Consent:  The patient/guardian has verbally consented to: the potential risks and benefits of telemedicine (video visit) versus in person care; bill my insurance or make self-payment for services provided; and responsibility for payment of non-covered services.     Patient would like the video invitation sent by:  My Chart    Mode of Communication:  Video Conference via Amwell    As the provider I attest to compliance with applicable laws and regulations related to telemedicine.       DATA:   Interactive Complexity: No   Crisis: No  Extended Session (53+ minutes):   - Treatment protocol required additional time to complete, due to the nature of diagnosis being treated.  See Interventions section for details      Presenting Concerns/  Current Stressors:   ADHD Eval      ASSESSMENT:  Mental Status Assessment:  Appearance:   Appropriate   Eye Contact:   Good   Psychomotor Behavior: Normal   Attitude:   Cooperative   Orientation:   All  Speech   Rate / Production: Normal/ Responsive   Volume:  Normal   Mood:    Anxious  Depressed   Affect:    Restricted   Thought Content:  Clear   Thought Form:  Coherent  Goal Directed   Insight:    Good  and Fair     Answers for HPI/ROS  submitted by the patient on 3/22/2022  If you checked off any problems, how difficult have these problems made it for you to do your work, take care of things at home, or get along with other people?: Very difficult  PHQ9 TOTAL SCORE: 19  INDY 7 TOTAL SCORE: 14    Safety Issues and Plan for Safety and Risk Management:   [place] Suicide Severity Rating Scale (Short Version)  [place] Suicide Severity Rating (Short Version) 3/22/2022   Q1 Wished to be Dead (Past Month) no   Q2 Suicidal Thoughts (Past Month) no   Q3 Suicidal Thought Method no   Q4 Suicidal Intent without Specific Plan no   Q5 Suicide Intent with Specific Plan no   Q6 Suicide Behavior (Lifetime) no   Level of Risk per Screen low risk     Patient denies current fears or concerns for personal safety.  Patient reports the following current or recent suicidal ideation or behaviors: passive SI.  Patient denies current or recent homicidal ideation or behaviors.  Patient denies current or recent self injurious behavior or ideation.  Patient denies other safety concerns.  Recommended that patient call 911 or go to the local ED should there be a change in any of these risk factors.  Patient reports there are firearms in the house. The firearms are not secured in a locked space. Client was advised to secure all firearms.     Diagnostic Criteria:  MDD/Grief  INDY  Rule Out ADHD  WHODAS 2.0 (12 item):   WHODAS 2.0 Total Score 3/22/2022   Total Score 37   [facility] Score [facility]     Intervention:   During today's session, this writer outlined the expectations and purpose of the ADHD Evaluation. The client discussed their reason for referral and symptoms. Their PHQ-9 and INDY-7 score were available for review; their risk was assessed. This writer used the adult ADHD Evaluation Intake Form to guide the clinical interview; it was not finished. A second session was scheduled to complete the interview process.     Collateral Reports Completed:  Routed note to Care Team  Member(s)    PLAN: (Homework, other):  1. Provider will continue Diagnostic Assessment.    2.  Suicide Risk and Safety Concerns were assessed for the client.      Jerica Schwartz, PhD LP  March 22, 2022

## 2022-03-23 ASSESSMENT — PATIENT HEALTH QUESTIONNAIRE - PHQ9: SUM OF ALL RESPONSES TO PHQ QUESTIONS 1-9: 19

## 2022-03-23 ASSESSMENT — ANXIETY QUESTIONNAIRES
GAD7 TOTAL SCORE: 14
GAD7 TOTAL SCORE: 14

## 2022-03-29 ENCOUNTER — VIRTUAL VISIT (OUTPATIENT)
Dept: PSYCHOLOGY | Facility: CLINIC | Age: 32
End: 2022-03-29
Payer: COMMERCIAL

## 2022-03-29 DIAGNOSIS — Z13.39 ATTENTION DEFICIT HYPERACTIVITY DISORDER (ADHD) EVALUATION: ICD-10-CM

## 2022-03-29 DIAGNOSIS — F33.1 MAJOR DEPRESSIVE DISORDER, RECURRENT EPISODE, MODERATE (H): Primary | ICD-10-CM

## 2022-03-29 PROCEDURE — 90834 PSYTX W PT 45 MINUTES: CPT | Mod: 95 | Performed by: PSYCHOLOGIST

## 2022-03-29 ASSESSMENT — PATIENT HEALTH QUESTIONNAIRE - PHQ9
10. IF YOU CHECKED OFF ANY PROBLEMS, HOW DIFFICULT HAVE THESE PROBLEMS MADE IT FOR YOU TO DO YOUR WORK, TAKE CARE OF THINGS AT HOME, OR GET ALONG WITH OTHER PEOPLE: SOMEWHAT DIFFICULT
SUM OF ALL RESPONSES TO PHQ QUESTIONS 1-9: 14
SUM OF ALL RESPONSES TO PHQ QUESTIONS 1-9: 14

## 2022-03-29 NOTE — PROGRESS NOTES
[facility]   Mental Health & Addiction Services     Progress Note - Initial Visit    Patient  Name:  Godfrey Porras Date: 2022         Service Type: Individual     Visit Start Time:   Visit End Time:     Visit #: 2    Attendees: Client    Service Modality:  Video Visit:      Provider verified identity through the following two step process.  Patient provided:  Patient     Telemedicine Visit: The patient's condition can be safely assessed and treated via synchronous audio and visual telemedicine encounter.      Reason for Telemedicine Visit: Services only offered telehealth    Originating Site (Patient Location): Patient's home    Distant Site (Provider Location): Provider Remote Setting- Home Office    Consent:  The patient/guardian has verbally consented to: the potential risks and benefits of telemedicine (video visit) versus in person care; bill my insurance or make self-payment for services provided; and responsibility for payment of non-covered services.     Patient would like the video invitation sent by:  My Chart    Mode of Communication:  Video Conference via Amwell    As the provider I attest to compliance with applicable laws and regulations related to telemedicine.       DATA:   Interactive Complexity: No   Crisis: No     Presenting Concerns/  Current Stressors:   ADHD Evaluation       ASSESSMENT:  Mental Status Assessment:  Appearance:   Appropriate   Eye Contact:   Good   Psychomotor Behavior: Normal   Attitude:   Cooperative   Orientation:   All  Speech   Rate / Production: Normal/ Responsive   Volume:  Normal   Mood:    Anxious  Depressed   Affect:    Appropriate   Thought Content:  Clear   Thought Form:  Coherent  Goal Directed   Insight:    Fair , Good    Safety Issues and Plan for Safety and Risk Management:     [place] Suicide Severity Rating Scale (Short Version)  [place] Suicide Severity Rating (Short Version) 3/22/2022   Q1 Wished to be Dead (Past  Month) no   Q2 Suicidal Thoughts (Past Month) no   Q3 Suicidal Thought Method no   Q4 Suicidal Intent without Specific Plan no   Q5 Suicide Intent with Specific Plan no   Q6 Suicide Behavior (Lifetime) no   Level of Risk per Screen low risk     Patient denies current fears or concerns for personal safety.  Patient denies current or recent suicidal ideation or behaviors.  Patient denies current or recent homicidal ideation or behaviors.  Patient denies current or recent self injurious behavior or ideation.  Patient denies other safety concerns.  Recommended that patient call 911 or go to the local ED should there be a change in any of these risk factors.     Diagnostic Criteria:  MDD  Rule Out ADHD    WHODAS 2.0 (12 item):   WHODAS 2.0 Total Score 3/22/2022   Total Score 37   Total Score MyChart 37     Intervention:   This provider was late to the start of the session given the previous client visit ran late. During the session, this writer worked with Godfery to complete the clinical interview. He provided general background information including developmental, interpersonal, trauma and chemical use history. The interview was not completed and a third visit was scheduled to assess mental health history/symptoms. He was sent the Renata Reports to begin testing so instructions were provided.   Collateral Reports Completed:  Routed note to Care Team Member(s)      PLAN: (Homework, other):  1. Provider will continue Diagnostic Assessment.    2.  Suicide Risk and Safety Concerns were assessed for Godfrey Porras.  3. Complete Renata Reports        Jerica Schwartz, PhD LP  March 29, 2022

## 2022-03-30 ASSESSMENT — PATIENT HEALTH QUESTIONNAIRE - PHQ9: SUM OF ALL RESPONSES TO PHQ QUESTIONS 1-9: 14

## 2022-04-27 ENCOUNTER — VIRTUAL VISIT (OUTPATIENT)
Dept: PSYCHOLOGY | Facility: CLINIC | Age: 32
End: 2022-04-27
Payer: COMMERCIAL

## 2022-04-27 DIAGNOSIS — F41.1 GAD (GENERALIZED ANXIETY DISORDER): ICD-10-CM

## 2022-04-27 DIAGNOSIS — Z13.39 ATTENTION DEFICIT HYPERACTIVITY DISORDER (ADHD) EVALUATION: ICD-10-CM

## 2022-04-27 DIAGNOSIS — F33.1 MAJOR DEPRESSIVE DISORDER, RECURRENT EPISODE, MODERATE (H): Primary | ICD-10-CM

## 2022-04-27 PROCEDURE — 90791 PSYCH DIAGNOSTIC EVALUATION: CPT | Mod: 95 | Performed by: PSYCHOLOGIST

## 2022-04-27 ASSESSMENT — ANXIETY QUESTIONNAIRES
7. FEELING AFRAID AS IF SOMETHING AWFUL MIGHT HAPPEN: MORE THAN HALF THE DAYS
GAD7 TOTAL SCORE: 12
7. FEELING AFRAID AS IF SOMETHING AWFUL MIGHT HAPPEN: MORE THAN HALF THE DAYS
6. BECOMING EASILY ANNOYED OR IRRITABLE: MORE THAN HALF THE DAYS
GAD7 TOTAL SCORE: 12
3. WORRYING TOO MUCH ABOUT DIFFERENT THINGS: SEVERAL DAYS
1. FEELING NERVOUS, ANXIOUS, OR ON EDGE: MORE THAN HALF THE DAYS
4. TROUBLE RELAXING: MORE THAN HALF THE DAYS
2. NOT BEING ABLE TO STOP OR CONTROL WORRYING: SEVERAL DAYS
GAD7 TOTAL SCORE: 12
5. BEING SO RESTLESS THAT IT IS HARD TO SIT STILL: MORE THAN HALF THE DAYS
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
## 2022-04-28 ASSESSMENT — ANXIETY QUESTIONNAIRES: GAD7 TOTAL SCORE: 12

## 2022-05-20 ENCOUNTER — TELEPHONE (OUTPATIENT)
Dept: FAMILY MEDICINE | Facility: CLINIC | Age: 32
End: 2022-05-20
Payer: COMMERCIAL

## 2022-05-20 NOTE — TELEPHONE ENCOUNTER
Patient Quality Outreach    Patient is due for the following:   Asthma  -  ACT needed    NEXT STEPS:       Type of outreach:    Copy of ACT mailed to patient.      Questions for provider review:    None     Jose Eduardo Villa MA

## 2022-06-13 ENCOUNTER — VIRTUAL VISIT (OUTPATIENT)
Dept: PSYCHOLOGY | Facility: CLINIC | Age: 32
End: 2022-06-13
Payer: COMMERCIAL

## 2022-06-13 DIAGNOSIS — F90.2 ATTENTION DEFICIT HYPERACTIVITY DISORDER (ADHD), COMBINED TYPE: ICD-10-CM

## 2022-06-13 DIAGNOSIS — F41.1 GAD (GENERALIZED ANXIETY DISORDER): ICD-10-CM

## 2022-06-13 DIAGNOSIS — F33.1 MAJOR DEPRESSIVE DISORDER, RECURRENT EPISODE, MODERATE (H): Primary | ICD-10-CM

## 2022-06-13 PROCEDURE — 96131 PSYCL TST EVAL PHYS/QHP EA: CPT | Mod: 95 | Performed by: PSYCHOLOGIST

## 2022-06-13 PROCEDURE — 96130 PSYCL TST EVAL PHYS/QHP 1ST: CPT | Mod: 95 | Performed by: PSYCHOLOGIST

## 2022-06-13 ASSESSMENT — ANXIETY QUESTIONNAIRES
7. FEELING AFRAID AS IF SOMETHING AWFUL MIGHT HAPPEN: SEVERAL DAYS
7. FEELING AFRAID AS IF SOMETHING AWFUL MIGHT HAPPEN: SEVERAL DAYS
2. NOT BEING ABLE TO STOP OR CONTROL WORRYING: SEVERAL DAYS
6. BECOMING EASILY ANNOYED OR IRRITABLE: NEARLY EVERY DAY
GAD7 TOTAL SCORE: 14
1. FEELING NERVOUS, ANXIOUS, OR ON EDGE: MORE THAN HALF THE DAYS
3. WORRYING TOO MUCH ABOUT DIFFERENT THINGS: MORE THAN HALF THE DAYS
5. BEING SO RESTLESS THAT IT IS HARD TO SIT STILL: NEARLY EVERY DAY
8. IF YOU CHECKED OFF ANY PROBLEMS, HOW DIFFICULT HAVE THESE MADE IT FOR YOU TO DO YOUR WORK, TAKE CARE OF THINGS AT HOME, OR GET ALONG WITH OTHER PEOPLE?: SOMEWHAT DIFFICULT
4. TROUBLE RELAXING: MORE THAN HALF THE DAYS

## 2022-06-13 ASSESSMENT — PATIENT HEALTH QUESTIONNAIRE - PHQ9
SUM OF ALL RESPONSES TO PHQ QUESTIONS 1-9: 15
SUM OF ALL RESPONSES TO PHQ QUESTIONS 1-9: 15
10. IF YOU CHECKED OFF ANY PROBLEMS, HOW DIFFICULT HAVE THESE PROBLEMS MADE IT FOR YOU TO DO YOUR WORK, TAKE CARE OF THINGS AT HOME, OR GET ALONG WITH OTHER PEOPLE: SOMEWHAT DIFFICULT

## 2022-06-13 NOTE — PROGRESS NOTES
"[facility]                                     Progress Note    Patient Name: Godfrey Porras  Date: 2022         Service Type: Individual      Session Start Time: 705 Session End Time: 736     Session Length: 31    Session #: 4    Attendees: Client    Service Modality:  Video Visit:      Provider verified identity through the following two step process.  Patient provided:  Patient     Telemedicine Visit: The patient's condition can be safely assessed and treated via synchronous audio and visual telemedicine encounter.      Reason for Telemedicine Visit: Services only offered telehealth    Originating Site (Patient Location): Patient's home    Distant Site (Provider Location): Provider Remote Setting- Home Office    Consent:  The patient/guardian has verbally consented to: the potential risks and benefits of telemedicine (video visit) versus in person care; bill my insurance or make self-payment for services provided; and responsibility for payment of non-covered services.     Patient would like the video invitation sent by:  My Chart    Mode of Communication:  Video Conference via Amwell    As the provider I attest to compliance with applicable laws and regulations related to telemedicine.    DATA  Interactive Complexity: No  Crisis: No        Progress Since Last Session (Related to Symptoms / Goals / Homework):   Symptoms: Improving stable/even    Homework: None      Episode of Care Goals: Satisfactory progress - PREPARATION (Decided to change - considering how); Intervened by negotiating a change plan and determining options / strategies for behavior change, identifying triggers, exploring social supports, and working towards setting a date to begin behavior change     Current / Ongoing Stressors and Concerns:   Mr. 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 He highlighted that being busy feels good. He asserted that his mood has been \"pretty " "even.\" He indicated he started Wellbutrin a month ago and noticed an improvement.      Treatment Objective(s) Addressed in This Session:   Review findings of ADHD Evaluation including testing, diagnoses and recommendations     Intervention:   During today's session, Mr. Porras was provided with feedback regarding his ADHD Evaluation. This writer outlined the contents of the report Mr. Porras informed him of the results of the symptom screenings. When administered the PHQ-9 and INDY-7, Mr. Chiquita milligan scores fell in the moderate range. He asserted that depressive symptoms are improving with medication. This writer reviewed the results of the MMPI-2. The validity scales indicated the protocol was valid. The clinical, restructure, content, supplementary and PSY-5 scales showed elevation on items at that measures somatic complaints, depressive and anxiety symptoms, relationship issues, introverted tendencies, feelings of demoralization, cynical attitudes, antisocial behavior, dysfunctional negative emotions, hypomanic activation, obsessions, anger, low self-esteem, social discomfort, work troubles, substance abuse, and traumatic experiences. Individuals with profiles like Mr. Chiquita milligan generally present with a moderate level of emotional distress characterized by dysphoria, agitation, and anhedonia. They often feel resentful and angry, and have difficulty controlling or expressing their anger appropriately. In response to stress, these men are likely to either withdraw completely or to act out their angry impulses. They tend to feel isolated, rejected, and unwanted. They may feel threatened by the world around them. They generally find it very difficult to get things done and have little hope of being successful even if they could get motivative to do something. Individuals with profiles like Mr. Chiquita milligan often have trouble concentrating and focusing on tasks. They tend to exhibit poor judgement and may " be unpredictable and impulsive. These men are likely to think their thoughts and dreams are best kept to themselves. They are generally suspicious of the motives of others. They are often introverted and have difficulty with close, emotional relationships as they lack basic social skills. These men may be emotionally distant because they believe no one understands them or their problems. Their feelings of rejection may lead to hostility and conflict which only exacerbates their feelings of alienation. He was diagnosed with MDD and INDY; he agreed with the diagnoses.     This writer also discussed the results of the Renata Reports. Mr. Porras  Childhood Symptoms forms from the Renata Reports as completed by him and his father were clinically significant. The results of the remaining Renata Reports as completed by him and his partner were mixed. He outlined clinically significant impairment from ADHD symptoms in all areas of functioning while his partner reported impairment in current executive functioning. He was diagnosed with Other ADHD given depression and anxiety symptoms remain acute. This writer reviewed recommendations and answered questions.    Assessments completed prior to visit:  PHQ9:   PHQ-9 SCORE 5/26/2017 8/4/2017 1/17/2022 2/7/2022 3/22/2022 3/29/2022 6/13/2022   PHQ-9 Total Score - - - - - - -   PHQ-9 Total Score MyChart - 20 (Severe depression) 16 (Moderately severe depression) - 19 (Moderately severe depression) 14 (Moderate depression) 15 (Moderately severe depression)   PHQ-9 Total Score 15 20 16 12 19 14 15     GAD7:   INDY-7 SCORE 5/26/2017 8/4/2017 2/7/2022 3/22/2022 3/22/2022 4/27/2022 6/13/2022   Total Score - - - - 14 (moderate anxiety) 12 (moderate anxiety) 14 (moderate anxiety)   Total Score 8 7 10 14 14 12 14         ASSESSMENT: Current Emotional / Mental Status (status of significant symptoms):   Risk status (Self / Other harm or suicidal ideation)   Patient denies current fears or  concerns for personal safety.   Patient denies current or recent suicidal ideation or behaviors.   Patient denies current or recent homicidal ideation or behaviors.   Patient denies current or recent self injurious behavior or ideation.   Patient denies other safety concerns.   Patient reports there has been no change in risk factors since their last session.     Patient reports there has been no change in protective factors since their last session.     Recommended that patient call 911 or go to the local ED should there be a change in any of these risk factors.     Appearance:   Appropriate    Eye Contact:   Good    Psychomotor Behavior: Normal    Attitude:   Cooperative    Orientation:   All   Speech    Rate / Production: Normal     Volume:  Normal    Mood:    Anxious  Dysphoric   Affect:    Appropriate    Thought Content:  Clear    Thought Form:  Coherent  Logical    Insight:    Good      Medication Review:   Changes to psychiatric medications, see updated Medication List in EPIC.      Medication Compliance:   Yes     Changes in Health Issues:   None reported     Chemical Use Review:   Substance Use: Chemical use reviewed, no active concerns identified      Tobacco Use: No current tobacco use.      Diagnosis:  1. Major depressive disorder, recurrent episode, moderate (H)    2. INDY (generalized anxiety disorder)    3. Attention deficit hyperactivity disorder (ADHD), combined type      Major Depressive Disorder, Recurrent, Moderate  Generalized Anxiety Disorder   Mr. Porras presented with depressive and anxiety symptoms. He disclosed has been nail/skin biting since he was 13 years old. He did not recall an awareness of feeling anxious until high school. He added that depressive symptoms emerged in high school. He admitted he did not want to get up to go to school yet was forced to get there on time. He highlighted that school was mentally taxing and asserted he did not want to be there. He acknowledged he  became socially withdrawn. He stated he struggled with suicidal ideations and was psychiatrically hospitalized at the age of 17 years old. He indicated that with services, therapy and mediation management, mental health symptoms remitted. He highlighted that symptoms returned within 6 months because he began abusing cannabis. He reported that when he was prescribed Wellbutrin in the year 2016, symptoms remitted for a time but then he stopped the medication. He outlined constant depressive symptoms since the year 2016 because he does not consistently take his medication. He acknowledged currently feeling depressed and struggling with anxiety. He disclosed he has been engaging in more nail biting and binge eating since he stopped medication. He denied recent suicidal ideations.     Other Attention Deficit Hyperactivity Disorder  Mr. Porras presented with some features of ADHD. He recalled symptoms emerging at the age of 6 years old. He indicated that as a youth, he had difficulty organizing, cleaning his room, finishing chores, getting ready for school, being on time for class, attending class, starting and completing homework, remembering school items, independently initiating his work, following the rules, getting along with others, and managing his emotions. Mr. Porras s Childhood Symptoms forms from the Renata Reports, as completed by him and his father were clinically significant. In adulthood, Mr. Porras indicated she has been struggling to focus and complete tasks at home and work. He acknowledged that at work he is often late, frequently makes mistakes, is easily bored and distracted, poorly manages time, has trouble organizing and problems learning new tasks/information. The results of the remaining Renata Reports as completed by him and his partner were mixed. He outlined clinically significant impairment from ADHD symptoms in all areas of functioning while his partner outlined impairment in  current and executive functioning.     Collateral Reports Completed:   Routed note to Care Team Member(s)    Recommendations:    1. Mr. Porras likely would benefit from receiving supportive services, tutoring services, and accommodations through the post-secondary institution of their choice.    a. Mr. Porras would benefit from taking exams alone, in a quiet room.  b. Mr. Porras would benefit from extended exam time.  c. Mr. Porras would benefit from note taking devices/services or audio recording of lectures.  d. Mr. Porras would benefit from use of a fidget device or the ability move around the classroom as long as they are not disturbing others.  e. Mr. Porras would benefit from extensions on course work due dates.  f. Mr. Porars may benefit from student support for coping with a disability in college.  g. Mr. Porras may benefit from help with time management and organizational skills.  h. Mr. Porras may benefit from assistance with advocating to his instructors and departments, their needs and accommodations.     2. Mr. Porras would likely benefit from continuing to engage in medication management services. They were recommended to follow their provider s treatment guidelines and recommendations. If their diagnoses are beyond the scope of practice for their primary care provider, they should be recommended to a psychiatric provider.    3. Mr. Porras may wish to reference the Coping with Attention-Deficit/Hyperactivity Disorder handout made available by [facility] for helpful tips for adults struggling with the disorder.    4. Mr. Porras was recommended to participate in cognitive testing to further assess her attention, working memory and processing speed.    5. Mr. Porras likely would benefit from engaging in individual therapy.       Jerica Schwartz, PhD LP  June 13, 2022      Psychological Testing Bill/Service  Summary:  Interview/Assessment Date Time   Interview 3/22/2022; 3/29/2022; 4/27/2022 [id number] (1); [phone] (.75); 6060-7613 (.75)  Previously billed   Documentation 3/22/2022; 3/29/2022; 4/27/2022 3444-1880 (.25); [phone] (.25); 4306-8102 (.5)  Previously billed   Testing Evaluation 28635- 1st 60 mins 28074- each add 60   Record Review & Clarify Referral Question     Clinical Decision Making     Patient Symptom Management     Battery Modification     Integration/ Report Generation 6/05/2022; 6/06/2022 8291-4489 (.5); 3083-0390 (1)   Feedback Session 6/13/[phone] (.5)   Post-Service Work 6/13/[phone] (.25)   Total Time 2.25    Total Units 1 1   Test Administration 11073- 1st 30 mins 47338- each add 30    Test Administration (Face)     Scoring     Total Time 0    Total Units 0    Diagnoses MDD/INDY/ADHD

## 2022-06-15 ENCOUNTER — TELEPHONE (OUTPATIENT)
Dept: FAMILY MEDICINE | Facility: CLINIC | Age: 32
End: 2022-06-15
Payer: COMMERCIAL

## 2022-06-15 NOTE — TELEPHONE ENCOUNTER
Reason for call:  Other   Patient called regarding (reason for call): call back  Additional comments: Patient's life insurance company is calling to get medical records sent. Please see SHAWN's sent 5/16/22 and 6/13/22. Per Patient, please send to [phone].    Phone number to reach patient:  Home number on file [phone] (home)    Best Time:  Any time    Can we leave a detailed message on this number?  YES    Travel screening: Not Applicable

## 2022-06-15 NOTE — TELEPHONE ENCOUNTER
SHAWN in media faxed to medical records department [phone] since request is asking for 5 years worth of medica records.       Called pt and lvm to follow up with medical records department at [phone] to get an update on his request.

## 2022-06-20 DIAGNOSIS — F33.1 MAJOR DEPRESSIVE DISORDER, RECURRENT EPISODE, MODERATE (H): ICD-10-CM

## 2022-06-20 RX ORDER — BUPROPION HYDROCHLORIDE 300 MG/1
300 TABLET ORAL EVERY MORNING
Qty: 30 TABLET | Refills: 2 | Status: CANCELLED | OUTPATIENT
Start: 2022-06-20

## 2022-06-20 NOTE — TELEPHONE ENCOUNTER
Future Office visit:    Next 5 appointments (look out 90 days)      Jun 23, 2022  9:00 AM  (Arrive by 8:40 AM)  Provider Visit with Jai Noriega MD  [facility] ([facility] [place] ) [address] [place] [id number] [phone]             Routing refill request to provider for review/approval because:  PHQ-9 score less than 5 in past 6 months

## 2022-06-21 ENCOUNTER — TELEPHONE (OUTPATIENT)
Dept: FAMILY MEDICINE | Facility: CLINIC | Age: 32
End: 2022-06-21

## 2022-06-21 NOTE — TELEPHONE ENCOUNTER
Pharmacy requesting alternative.  fluticasone (FLOVENT HFA) 220 MCG/ACT inhaler no longer covered?

## 2022-06-23 ENCOUNTER — VIRTUAL VISIT (OUTPATIENT)
Dept: FAMILY MEDICINE | Facility: CLINIC | Age: 32
End: 2022-06-23
Payer: COMMERCIAL

## 2022-06-23 DIAGNOSIS — J45.40 MODERATE PERSISTENT ASTHMA WITHOUT COMPLICATION: Primary | ICD-10-CM

## 2022-06-23 DIAGNOSIS — F33.1 MAJOR DEPRESSIVE DISORDER, RECURRENT EPISODE, MODERATE (H): ICD-10-CM

## 2022-06-23 PROCEDURE — 99214 OFFICE O/P EST MOD 30 MIN: CPT | Mod: 95 | Performed by: FAMILY MEDICINE

## 2022-06-23 RX ORDER — FLUTICASONE PROPIONATE AND SALMETEROL XINAFOATE 115; 21 UG/1; UG/1
2 AEROSOL, METERED RESPIRATORY (INHALATION) 2 TIMES DAILY
Qty: 36 G | Refills: 1 | Status: SHIPPED | OUTPATIENT
Start: 2022-06-23 | End: 2023-01-19

## 2022-06-23 RX ORDER — ALBUTEROL SULFATE 90 UG/1
2 AEROSOL, METERED RESPIRATORY (INHALATION) EVERY 4 HOURS PRN
Qty: 8.5 G | Refills: 2 | Status: SHIPPED | OUTPATIENT
Start: 2022-06-23 | End: 2023-01-07

## 2022-06-23 RX ORDER — BUPROPION HYDROCHLORIDE 300 MG/1
300 TABLET ORAL EVERY MORNING
Qty: 90 TABLET | Refills: 1 | Status: SHIPPED | OUTPATIENT
Start: 2022-06-23 | End: 2023-01-19

## 2022-06-23 ASSESSMENT — ASTHMA QUESTIONNAIRES: ACT_TOTALSCORE: 19

## 2022-06-23 NOTE — PROGRESS NOTES
Godfrey is a 31 year old who is being evaluated via a billable video visit.      How would you like to obtain your AVS? MyChart  If the video visit is dropped, the invitation should be resent by: Send to e-mail at: [email or web address]  Will anyone else be joining your video visit? No          Assessment & Plan     ASSESSMENT/PLAN:  (J45.40) Moderate persistent asthma without complication  (primary encounter diagnosis)  Comment: Well-controlled  Plan: fluticasone-salmeterol (ADVAIR HFA) 115-21         MCG/ACT inhaler, albuterol (PROAIR         HFA/PROVENTIL HFA/VENTOLIN HFA) 108 (90 Base)         MCG/ACT inhaler        Change Flovent to Advair. Return in about 2 months (around 8/23/2022) for recheck medications.      (F33.1) Major depressive disorder, recurrent episode, moderate (H)  Comment: PT REPORTS DEP AND ANX IMPROVED, NO PROB WITH bupropion WHICH HE TAKES DAILY, SEES COUNSELOR REGULARLY, OTHERWISE NOT ADDRESSED, BUT PHQ-9 AND INDY-7 HAVE BEEN DONE  Plan: buPROPion (WELLBUTRIN XL) 300 MG 24 hr tablet                         25 minutes spent on the date of the encounter doing chart review, patient visit and documentation       Return in about 2 months (around 8/23/2022) for recheck medications.    Jai Noriega MD  [facility]   Godfrey is a 31 year old, presenting for the following health issues:  Recheck Medication and Refill Request      History of Present Illness       Reason for visit:  Med Check    He eats 4 or more servings of fruits and vegetables daily.He consumes 0 sweetened beverage(s) daily.He exercises with enough effort to increase his heart rate 20 to 29 minutes per day.  He exercises with enough effort to increase his heart rate 7 days per week.   He is taking medications regularly.     {    Asthma Follow-Up    Was ACT completed today?    Yes    ACT Total Scores 6/23/2022   ACT TOTAL SCORE -   ASTHMA ER VISITS -   ASTHMA HOSPITALIZATIONS -   ACT TOTAL  SCORE (Goal Greater than or Equal to 20) 19   In the past 12 months, how many times did you visit the emergency room for your asthma without being admitted to the hospital? 0   In the past 12 months, how many times were you hospitalized overnight because of your asthma? 0                  Review of Systems         Objective           Vitals:  No vitals were obtained today due to virtual visit.    Physical Exam   GENERAL: Healthy, alert and no distress  EYES: Eyes grossly normal to inspection.  No discharge or erythema, or obvious scleral/conjunctival abnormalities.  RESP: No audible wheeze, cough, or visible cyanosis.  No visible retractions or increased work of breathing.    SKIN: Visible skin clear. No significant rash, abnormal pigmentation or lesions.  NEURO: Cranial nerves grossly intact.  Mentation and speech appropriate for age.  PSYCH: Mentation appears normal, affect normal/bright, judgement and insight intact, normal speech and appearance well-groomed.                Video-Visit Details    Video Start Time: 9:24 AM    Type of service:  Video Visit    Video End Time:9:39 AM    Originating Location (pt. Location): Home    Distant Location (provider location):  [facility]     Platform used for Video Visit: [employer]  [employer]omo

## 2022-06-27 ENCOUNTER — FCC EXTENDED DOCUMENTATION (OUTPATIENT)
Dept: PSYCHOLOGY | Facility: CLINIC | Age: 32
End: 2022-06-27

## 2022-06-27 ENCOUNTER — VIRTUAL VISIT (OUTPATIENT)
Dept: PSYCHOLOGY | Facility: CLINIC | Age: 32
End: 2022-06-27
Payer: COMMERCIAL

## 2022-06-27 DIAGNOSIS — F33.1 MAJOR DEPRESSIVE DISORDER, RECURRENT EPISODE, MODERATE (H): Primary | ICD-10-CM

## 2022-06-27 DIAGNOSIS — Z13.39 ATTENTION DEFICIT HYPERACTIVITY DISORDER (ADHD) EVALUATION: ICD-10-CM

## 2022-06-27 DIAGNOSIS — F41.1 GAD (GENERALIZED ANXIETY DISORDER): ICD-10-CM

## 2022-06-27 PROCEDURE — 96131 PSYCL TST EVAL PHYS/QHP EA: CPT | Mod: 95 | Performed by: PSYCHOLOGIST

## 2022-06-28 NOTE — PROGRESS NOTES
[facility]                                     Progress Note    Patient Name: Godfrey Porras  Date: 2022         Service Type: Individual      Session Start Time: 226  Session End Time: 256     Session Length: 30    Session #: 4    Attendees: Client    Service Modality:  Video Visit:      Provider verified identity through the following two step process.  Patient provided:  Patient     Telemedicine Visit: The patient's condition can be safely assessed and treated via synchronous audio and visual telemedicine encounter.      Reason for Telemedicine Visit: Services only offered telehealth    Originating Site (Patient Location): Patient's home    Distant Site (Provider Location): Provider Remote Setting- Home Office    Consent:  The patient/guardian has verbally consented to: the potential risks and benefits of telemedicine (video visit) versus in person care; bill my insurance or make self-payment for services provided; and responsibility for payment of non-covered services.     Patient would like the video invitation sent by:  My Chart    Mode of Communication:  Video Conference via [employer]well    As the provider I attest to compliance with applicable laws and regulations related to telemedicine.    DATA  Interactive Complexity: No  Crisis: No        Progress Since Last Session (Related to Symptoms / Goals / Homework):   Symptoms: No change stable    Homework: None      Episode of Care Goals: Satisfactory progress - PREPARATION (Decided to change - considering how); Intervened by negotiating a change plan and determining options / strategies for behavior change, identifying triggers, exploring social supports, and working towards setting a date to begin behavior change     Current / Ongoing Stressors and Concerns:   Mr. Porras asserted that his mood has improved a little bit since he restarted Wellbutrin 2 months ago yet depression and anxiety remain moderately present.  He reported  no change in stressors     Treatment Objective(s) Addressed in This Session:   Review findings of CNS Vital Signs as part of ADHD testing     Intervention:        During today's session, Mr. Porras was provided with feedback regarding cognitive testing associated with the ADHD Evaluation. This writer outlined the contents of the report and informed his of the results of the symptom screeners. When previously administered the PHQ-9 and INDY-7, his scores fell within the moderate range. He acknowledged continued symptoms despite taking Wellbutrin. This writer also reviewed the results of the CNS Vital Signs; see chart below. This writer did not assign a full diagnosis of ADHD. When administered cognitive measures, two of the scales associated with ADHD were possibly invalid, however, they fell within the average range (Complex Attention and Cognitive Flexibility). He presented with a slight deficit in Processing Speed and a deficit in Simple Attention. Given that Mr. Porras did not demonstrate global deficits, his concerns regarding cognitive deficits are likely due to depression and anxiety, and also possibly a slower processing speed related to his overall cognitive functioning. He was recommended to participate in in-person cognitive testing. This writer reviewed recommendations and answered questions.     Assessments completed prior to visit:  PHQ9:   PHQ-9 SCORE 5/26/2017 8/4/2017 1/17/2022 2/7/2022 3/22/2022 3/29/2022 6/13/2022   PHQ-9 Total Score - - - - - - -   PHQ-9 Total Score MyChart - 20 (Severe depression) 16 (Moderately severe depression) - 19 (Moderately severe depression) 14 (Moderate depression) 15 (Moderately severe depression)   PHQ-9 Total Score 15 20 16 12 19 14 15     GAD7:   INDY-7 SCORE 5/26/2017 8/4/2017 2/7/2022 3/22/2022 3/22/2022 4/27/2022 6/13/2022   Total Score - - - - 14 (moderate anxiety) 12 (moderate anxiety) 14 (moderate anxiety)   Total Score 8 7 10 14 14 12 14          ASSESSMENT: Current Emotional / Mental Status (status of significant symptoms):   Risk status (Self / Other harm or suicidal ideation)   Patient denies current fears or concerns for personal safety.   Patient denies current or recent suicidal ideation or behaviors.   Patient denies current or recent homicidal ideation or behaviors.   Patient denies current or recent self injurious behavior or ideation.   Patient denies other safety concerns.   Patient reports there has been no change in risk factors since their last session.     Patient reports there has been no change in protective factors since their last session.     Recommended that patient call 911 or go to the local ED should there be a change in any of these risk factors.     Appearance:   Appropriate    Eye Contact:   Good    Psychomotor Behavior: Normal    Attitude:   Cooperative    Orientation:   All   Speech    Rate / Production: Normal/ Responsive Normal     Volume:  Normal    Mood:    Anxious  Depressed    Affect:    Appropriate    Thought Content:  Clear    Thought Form:  Coherent  Logical    Insight:    Good      Medication Review:   No changes to current psychiatric medication(s)     Medication Compliance:   Yes     Changes in Health Issues:   None reported     Chemical Use Review:   Substance Use: Chemical use reviewed, no active concerns identified      Tobacco Use: No current tobacco use.      Diagnosis:  1. Major depressive disorder, recurrent episode, moderate (H)    2. INDY (generalized anxiety disorder)    3. Attention deficit hyperactivity disorder (ADHD) evaluation      Major Depressive Disorder, Recurrent, Moderate  Generalized Anxiety Disorder   Mr. Porras presented with depressive and anxiety symptoms. He disclosed has been nail/skin biting since he was 13 years old. He did not recall an awareness of feeling anxious until high school. He added that depressive symptoms emerged in high school. He admitted he did not want to get  up to go to school yet was forced to get there on time. He highlighted that school was mentally taxing and asserted he did not want to be there. He acknowledged he became socially withdrawn. He stated he struggled with suicidal ideations and was psychiatrically hospitalized at the age of 17 years old. He indicated that with services, therapy and mediation management, mental health symptoms remitted. He highlighted that symptoms returned within 6 months because he began abusing cannabis. He reported that when he was prescribed Wellbutrin in the year 2016, symptoms remitted for a time but then he stopped the medication. He outlined constant depressive symptoms since the year 2016 because he does not consistently take his medication. He acknowledged currently feeling depressed and struggling with anxiety. He disclosed he has been engaging in more nail biting and binge eating since he stopped medication. He denied recent suicidal ideations.     Unspecified Attention Deficit Hyperactivity Disorder  Mr. Porras did not meet criterion for ADHD. He recalled symptoms emerging at the age of 6 years old. He indicated that as a youth, he had difficulty organizing, cleaning his room, finishing chores, getting ready for school, being on time for class, attending class, starting and completing homework, remembering school items, independently initiating his work, following the rules, getting along with others, and managing his emotions. Mr. Porras s Childhood Symptoms forms from the [employer]ata Reports, as completed by him and his father were clinically significant. In adulthood, Mr. Porrsa indicated he has been struggling to focus and complete tasks at home and work. He acknowledged that at work he is often late, frequently makes mistakes, is easily bored and distracted, poorly manages time, has trouble organizing and problems learning new tasks/information. The results of the remaining Renata Reports as completed by him  and his partner were mixed. He outlined clinically significant impairment from ADHD symptoms in all areas of functioning while his partner outlined impairment in current and executive functioning. When administered cognitive measures, two of the scales associated with ADHD were possibly invalid, however, they fell within the average range (Complex Attention and Cognitive Flexibility). He presented with a slight deficit in Processing Speed and a deficit in Simple Attention. Given that Mr. Porras did not demonstrate global deficits, his concerns regarding cognitive deficits are likely due to depression and anxiety, and also possibly a slower processing speed related to his overall cognitive functioning. He was recommended to participate in in-person cognitive testing for a more controlled environment.     Collateral Reports Completed:   Routed note to Care Team Member(s)    PLAN: (Patient Tasks / Therapist Tasks / Other)  1. Mr. Porras likely would benefit from receiving supportive services, tutoring services, and accommodations through the post-secondary institution of their choice.    a. Mr. Porras would benefit from taking exams alone, in a quiet room.  b. Mr. Porras would benefit from extended exam time.  c. Mr. Porras would benefit from note taking devices/services or audio recording of lectures.  d. Mr. Porras would benefit from use of a fidget device or the ability move around the classroom as long as they are not disturbing others.  e. Mr. Porras would benefit from extensions on course work due dates.  f. Mr. Porras may benefit from student support for coping with a disability in college.  g. Mr. Porras may benefit from help with time management and organizational skills.  h. Mr. Porras may benefit from assistance with advocating to his instructors and departments, their needs and accommodations.     2. Mr. Porras would likely benefit from engaging in medication  management services. They were recommended to follow their provider s treatment guidelines and recommendations. If their diagnoses are beyond the scope of practice for their primary care provider, they should be recommended to a psychiatric provider.    3. Mr. Porras likely would benefit from engaging in individual therapy.     4. Mr. Porras was recommended to participate in in-person cognitive testing to further assess working memory and processing speed.      Jerica Schwartz, PhD LP  June 27, 2022      Psychological Testing Bill/Service Summary:  Interview/Assessment Date Time   Interview 3/22/2022; 3/29/2022; 4/27/2022 [id number] (1); [phone] (.75); 1617-0567 (.75)  Previously billed   Documentation 3/22/2022; 3/29/2022; 4/27/2022 5978-3794 (.25); [phone] (.25); 4141-6788 (.5)  Previously billed   Testing Evaluation 81569- 1st 60 mins 92598- each add 60   Record Review & Clarify Referral Question     Clinical Decision Making     Patient Symptom Management     Battery Modification     Integration/ Report Generation 6/05/2022; 6/06/2022 3518-9389 (.5); 5622-5798 (1)   Feedback Session 6/13/[phone] (.5)   Post-Service Work 6/13/[phone] (.25)   Total Time 2.25    Total Units 1 Previously billed 1 Previously billed   Integration/ Report Generation 6/21/2022 4866-3822(.5)   Feedback Session 6/27/2022 0795-9940 (.5)   Post-Service Work 6/27/2022 0674-1481 (.25)   Total Time 1.25    Total Units 1    Diagnoses MDD/INDY

## 2022-07-19 ENCOUNTER — OFFICE VISIT (OUTPATIENT)
Dept: URGENT CARE | Facility: URGENT CARE | Age: 32
End: 2022-07-19
Payer: COMMERCIAL

## 2022-07-19 VITALS
HEART RATE: 76 BPM | DIASTOLIC BLOOD PRESSURE: 75 MMHG | OXYGEN SATURATION: 96 % | TEMPERATURE: 97.6 F | BODY MASS INDEX: 25.52 KG/M2 | WEIGHT: 188.4 LBS | HEIGHT: 72 IN | SYSTOLIC BLOOD PRESSURE: 116 MMHG

## 2022-07-19 DIAGNOSIS — H10.32 ACUTE CONJUNCTIVITIS OF LEFT EYE, UNSPECIFIED ACUTE CONJUNCTIVITIS TYPE: Primary | ICD-10-CM

## 2022-07-19 PROCEDURE — 99213 OFFICE O/P EST LOW 20 MIN: CPT | Performed by: PHYSICIAN ASSISTANT

## 2022-07-19 RX ORDER — FEXOFENADINE HCL 180 MG/1
180 TABLET ORAL DAILY
COMMUNITY
End: 2023-01-19

## 2022-07-19 RX ORDER — POLYMYXIN B SULFATE AND TRIMETHOPRIM 1; 10000 MG/ML; [USP'U]/ML
1-2 SOLUTION OPHTHALMIC 4 TIMES DAILY
Qty: 10 ML | Refills: 0 | Status: SHIPPED | OUTPATIENT
Start: 2022-07-19 | End: 2022-07-26

## 2022-07-19 ASSESSMENT — ENCOUNTER SYMPTOMS
SHORTNESS OF BREATH: 0
RESPIRATORY NEGATIVE: 1
EYE DISCHARGE: 1
GASTROINTESTINAL NEGATIVE: 1
FEVER: 0
EYE PAIN: 1
CHEST TIGHTNESS: 0
CARDIOVASCULAR NEGATIVE: 1
SINUS PRESSURE: 0
WHEEZING: 0
FATIGUE: 0
CHILLS: 0
PALPITATIONS: 0
RHINORRHEA: 0
SINUS PAIN: 0
SORE THROAT: 0
COUGH: 0

## 2022-07-19 NOTE — PROGRESS NOTES
Jordan Donahue is a 32 year old, presenting for the following health issues:  Eye Discharge Left Eye and Eye Pain Left Eye    HPI   Eye(s) Problem  Onset/Duration: today  Description:   Location: L eye  Pain: YES  Redness: YES  Accompanying Signs & Symptoms:  Discharge/mattering: YES  Swelling: No  Visual changes: No  Fever: No  Nasal Congestion: No  Bothered by bright lights: No  History:  Trauma: No  Foreign body exposure: No  Wearing contacts: No  Precipitating or alleviating factors: None  Therapies tried and outcome: warm compresses with minimal relief    Patient Active Problem List   Diagnosis     ADD (attention deficit disorder) without hyperactivity     Marijuana use     Major depressive disorder, recurrent episode, moderate (H)     INDY (generalized anxiety disorder)     Mild persistent asthma without complication     Current Outpatient Medications   Medication     albuterol (PROAIR HFA/PROVENTIL HFA/VENTOLIN HFA) 108 (90 Base) MCG/ACT inhaler     buPROPion (WELLBUTRIN XL) 300 MG 24 hr tablet     fexofenadine (ALLEGRA) 180 MG tablet     fluticasone-salmeterol (ADVAIR HFA) 115-21 MCG/ACT inhaler     No current facility-administered medications for this visit.        Allergies   Allergen Reactions     Amoxicillin Rash     Penicillins Rash       Review of Systems   Constitutional: Negative for chills, fatigue and fever.   HENT: Negative for congestion, ear discharge, ear pain, hearing loss, rhinorrhea, sinus pressure, sinus pain and sore throat.    Eyes: Positive for pain and discharge.   Respiratory: Negative.  Negative for cough, chest tightness, shortness of breath and wheezing.    Cardiovascular: Negative.  Negative for chest pain, palpitations and peripheral edema.   Gastrointestinal: Negative.    All other systems reviewed and are negative.           Objective    /75 (BP Location: Left arm, Patient Position: Sitting, Cuff Size: Adult Regular)   Pulse 76   Temp 97.6  F (36.4  C) (Tympanic)    Ht 1.829 m (6')   Wt 85.5 kg (188 lb 6.4 oz)   SpO2 96%   BMI 25.55 kg/m    Body mass index is 25.55 kg/m .  Physical Exam  Vitals and nursing note reviewed.   Constitutional:       General: He is not in acute distress.     Appearance: Normal appearance. He is normal weight. He is not ill-appearing.   HENT:      Head: Normocephalic and atraumatic.      Nose: Nose normal.      Mouth/Throat:      Lips: Pink.      Mouth: Mucous membranes are moist.      Pharynx: Oropharynx is clear. Uvula midline. No pharyngeal swelling, oropharyngeal exudate or posterior oropharyngeal erythema.   Eyes:      General: Lids are normal. Lids are everted, no foreign bodies appreciated. No scleral icterus.        Right eye: No foreign body or discharge.         Left eye: Discharge present.No foreign body.      Extraocular Movements: Extraocular movements intact.      Conjunctiva/sclera:      Right eye: Right conjunctiva is not injected.      Left eye: Left conjunctiva is injected.      Pupils: Pupils are equal, round, and reactive to light.   Neck:      Thyroid: No thyromegaly.   Cardiovascular:      Rate and Rhythm: Normal rate and regular rhythm.      Pulses: Normal pulses.      Heart sounds: Normal heart sounds, S1 normal and S2 normal. No murmur heard.    No friction rub. No gallop.   Pulmonary:      Effort: Pulmonary effort is normal. No respiratory distress.      Breath sounds: Normal breath sounds and air entry. No wheezing or rales.   Musculoskeletal:      Cervical back: Normal range of motion and neck supple.   Lymphadenopathy:      Cervical: No cervical adenopathy.   Skin:     General: Skin is warm and dry.   Neurological:      Mental Status: He is alert and oriented to person, place, and time.   Psychiatric:         Mood and Affect: Mood normal.         Behavior: Behavior normal.         Thought Content: Thought content normal.         Judgment: Judgment normal.          Assessment/Plan:  Acute conjunctivitis of left eye,  unspecified acute conjunctivitis type: Will treat with polytrim eye drops as directed X7days.  Continue with warm compresses and frequent hand washing to prevent transmission.  Tylenol/ibuprofen as needed for pain/fever.  Recheck in clinic if symptoms worsen or if symptoms do not improve.  -     trimethoprim-polymyxin b (POLYTRIM) 64017-3.1 UNIT/ML-% ophthalmic solution; Place 1-2 drops Into the left eye 4 times daily for 7 days        Charlnee See TIGIST Still.

## 2022-08-02 ENCOUNTER — OFFICE VISIT (OUTPATIENT)
Dept: PSYCHOLOGY | Facility: CLINIC | Age: 32
End: 2022-08-02
Payer: COMMERCIAL

## 2022-08-02 DIAGNOSIS — F90.2 ATTENTION DEFICIT HYPERACTIVITY DISORDER (ADHD), COMBINED TYPE: Primary | ICD-10-CM

## 2022-08-02 ASSESSMENT — ANXIETY QUESTIONNAIRES
3. WORRYING TOO MUCH ABOUT DIFFERENT THINGS: MORE THAN HALF THE DAYS
5. BEING SO RESTLESS THAT IT IS HARD TO SIT STILL: SEVERAL DAYS
6. BECOMING EASILY ANNOYED OR IRRITABLE: MORE THAN HALF THE DAYS
GAD7 TOTAL SCORE: 10
7. FEELING AFRAID AS IF SOMETHING AWFUL MIGHT HAPPEN: SEVERAL DAYS
2. NOT BEING ABLE TO STOP OR CONTROL WORRYING: MORE THAN HALF THE DAYS
4. TROUBLE RELAXING: SEVERAL DAYS
1. FEELING NERVOUS, ANXIOUS, OR ON EDGE: SEVERAL DAYS
GAD7 TOTAL SCORE: 10

## 2022-08-02 ASSESSMENT — PATIENT HEALTH QUESTIONNAIRE - PHQ9: SUM OF ALL RESPONSES TO PHQ QUESTIONS 1-9: 12

## 2022-08-02 NOTE — PROGRESS NOTES
Mr. Porras presented today for cognitive testing as part of the ADHD Evaluation. He was administered the WAIS-IV and Trails A/B    Testing 4315-6634 (1.5)  Scoring 3046-9266  (.25)

## 2022-08-08 ENCOUNTER — VIRTUAL VISIT (OUTPATIENT)
Dept: PSYCHOLOGY | Facility: CLINIC | Age: 32
End: 2022-08-08
Payer: COMMERCIAL

## 2022-08-08 ENCOUNTER — FCC EXTENDED DOCUMENTATION (OUTPATIENT)
Dept: PSYCHOLOGY | Facility: CLINIC | Age: 32
End: 2022-08-08

## 2022-08-08 DIAGNOSIS — F90.2 ATTENTION DEFICIT HYPERACTIVITY DISORDER (ADHD), COMBINED TYPE: Primary | ICD-10-CM

## 2022-08-08 DIAGNOSIS — F33.1 MAJOR DEPRESSIVE DISORDER, RECURRENT EPISODE, MODERATE (H): ICD-10-CM

## 2022-08-08 DIAGNOSIS — F41.1 GAD (GENERALIZED ANXIETY DISORDER): ICD-10-CM

## 2022-08-08 PROCEDURE — 96136 PSYCL/NRPSYC TST PHY/QHP 1ST: CPT | Mod: 95 | Performed by: PSYCHOLOGIST

## 2022-08-08 PROCEDURE — 96130 PSYCL TST EVAL PHYS/QHP 1ST: CPT | Mod: 95 | Performed by: PSYCHOLOGIST

## 2022-08-08 PROCEDURE — 96137 PSYCL/NRPSYC TST PHY/QHP EA: CPT | Mod: 95 | Performed by: PSYCHOLOGIST

## 2022-08-08 NOTE — PROGRESS NOTES
[facility]                                     Progress Note    Patient Name: Godfrey Porras  Date: 2022         Service Type: Individual      Session Start Time: 1002  Session End Time: 1024     Session Length: 22    Session #: 6    Attendees: Client    Service Modality:  Video Visit:      Provider verified identity through the following two step process.  Patient provided:  Patient     Telemedicine Visit: The patient's condition can be safely assessed and treated via synchronous audio and visual telemedicine encounter.      Reason for Telemedicine Visit: Services only offered telehealth    Originating Site (Patient Location): Patient's home    Distant Site (Provider Location): Provider Remote Setting- Home Office    Consent:  The patient/guardian has verbally consented to: the potential risks and benefits of telemedicine (video visit) versus in person care; bill my insurance or make self-payment for services provided; and responsibility for payment of non-covered services.     Patient would like the video invitation sent by:  My Chart    Mode of Communication:  Video Conference via Amwell    As the provider I attest to compliance with applicable laws and regulations related to telemedicine.    DATA  Interactive Complexity: No  Crisis: No        Progress Since Last Session (Related to Symptoms / Goals / Homework):   Symptoms: No change stable, chronic    Homework: None      Episode of Care Goals: Satisfactory progress - PREPARATION (Decided to change - considering how); Intervened by negotiating a change plan and determining options / strategies for behavior change, identifying triggers, exploring social supports, and working towards setting a date to begin behavior change     Current / Ongoing Stressors and Concerns:   Mr. Porras reported no changes in stressors. He indicated that symptoms have been improving with consistent use of Wellbutrin.      Treatment Objective(s)  Addressed in This Session:   Review findings of cognitive testing as part of ADHD Evaluation including testing results, diagnoses and recommendations     Intervention:        During today's session, Mr. Porras was provided with feedback regarding cognitive testing associated with the ADHD Evaluation. This writer outlined the contents of the report. His symptoms screeners showed a decline in mood symptoms, yet scores remain in the moderate range. This writer reviewed the results of the WAIS and Trails; see charts below. When administered the CNS Vital Sign, although scores were mostly average, all scales associated with ADHD including Complex Attention, Cognitive Flexibility, Processing Speed, Executive Function and Simple Attention showed clinically significant deficits as compared to his overall cognitive abilities which are estimated to fall within the superior range (memory is mostly intact with slower processing speed at times). He did meet full criterion for ADHD. This writer reviewed recommendations and answered questions.    Assessments completed prior to visit:  WAIS-IV  Index Standard Score Percentile Confidence Interval   Verbal Comprehension 122 93 115-127   Perceptual Reasoning 109 73 102-115   Working Memory 122 93 114-127   Processing Speed 111 77 102-118   Intelligence Quotient 120 91 116-124     PHQ9:   PHQ-9 SCORE 8/4/2017 1/17/2022 2/7/2022 3/22/2022 3/29/2022 6/13/2022 8/2/2022   PHQ-9 Total Score - - - - - - -   PHQ-9 Total Score MyChart 20 (Severe depression) 16 (Moderately severe depression) - 19 (Moderately severe depression) 14 (Moderate depression) 15 (Moderately severe depression) -   PHQ-9 Total Score 20 16 12 19 14 15 12     GAD7:   INDY-7 SCORE 8/4/2017 2/7/2022 3/22/2022 3/22/2022 4/27/2022 6/13/2022 8/2/2022   Total Score - - - 14 (moderate anxiety) 12 (moderate anxiety) 14 (moderate anxiety) -   Total Score 7 10 14 14 12 14 10         ASSESSMENT: Current Emotional / Mental Status  (status of significant symptoms):   Risk status (Self / Other harm or suicidal ideation)   Patient denies current fears or concerns for personal safety.   Patient denies current or recent suicidal ideation or behaviors.   Patient denies current or recent homicidal ideation or behaviors.   Patient denies current or recent self injurious behavior or ideation.   Patient denies other safety concerns.   Patient reports there has been no change in risk factors since their last session.     Patient reports there has been no change in protective factors since their last session.     Recommended that patient call 911 or go to the local ED should there be a change in any of these risk factors.     Appearance:   Appropriate    Eye Contact:   Good    Psychomotor Behavior: Normal  Restless    Attitude:   Cooperative    Orientation:   All   Speech    Rate / Production: Normal/ Responsive Normal     Volume:  Normal    Mood:    Anxious  Dysphoric   Affect:    Appropriate    Thought Content:  Clear    Thought Form:  Coherent  Logical    Insight:    Good  and Fair      Medication Review:   No changes to current psychiatric medication(s)     Medication Compliance:   Yes     Changes in Health Issues:   None reported     Chemical Use Review:   Substance Use: Chemical use reviewed, no active concerns identified      Tobacco Use: No current tobacco use.      Diagnosis:  1. Major depressive disorder, recurrent episode, moderate (H)    2. INDY (generalized anxiety disorder)    3. Attention deficit hyperactivity disorder (ADHD), combined type      Major Depressive Disorder, Recurrent, Moderate  Generalized Anxiety Disorder   Mr. Porras presented with depressive and anxiety symptoms. He disclosed has been nail/skin biting since he was 13 years old. He did not recall an awareness of feeling anxious until high school. He added that depressive symptoms emerged in high school. He admitted he did not want to get up to go to school yet was  forced to get there on time. He highlighted that school was mentally taxing and asserted he did not want to be there. He acknowledged he became socially withdrawn. He stated he struggled with suicidal ideations and was psychiatrically hospitalized at the age of 17 years old. He indicated that with services, therapy and mediation management, mental health symptoms remitted. He highlighted that symptoms returned within 6 months because he began abusing cannabis. He reported that when he was prescribed Wellbutrin in the year 2016, symptoms remitted for a time but then he stopped the medication. He outlined constant depressive symptoms since the year 2016 because he does not consistently take his medication. He acknowledged currently feeling depressed and struggling with anxiety. He disclosed he has been engaging in more nail biting and binge eating since he stopped medication. He denied recent suicidal ideations.     Attention Deficit Hyperactivity Disorder, Combined Type   Mr. Porras met criterion for ADHD. He recalled symptoms emerging at the age of 6 years old. He indicated that as a youth, he had difficulty organizing, cleaning his room, finishing chores, getting ready for school, being on time for class, attending class, starting and completing homework, remembering school items, independently initiating his work, following the rules, getting along with others, and managing his emotions. Mr. Porras s Childhood Symptoms forms from the Renata Reports, as completed by him and his father were clinically significant. In adulthood, Mr. Porras indicated he has been struggling to focus and complete tasks at home and work. He acknowledged that at work he is often late, frequently makes mistakes, is easily bored and distracted, poorly manages time, has trouble organizing and problems learning new tasks/information. The results of the remaining Renata Reports as completed by him and his partner were mixed. He  outlined clinically significant impairment from ADHD symptoms in all areas of functioning while his partner outlined impairment in current and executive functioning. When administered the CNS Vital Sign, although scores were mostly average, all scales associated with ADHD including Complex Attention, Cognitive Flexibility, Processing Speed, Executive Function and Simple Attention showed clinically significant deficits as compared to his overall cognitive abilities which are estimated to fall within the superior range (memory is mostly intact with slower processing speed at times).     Collateral Reports Completed:   Routed note to Care Team Member(s)    Recommendations:    1. Mr. Porras likely would benefit from receiving supportive services, tutoring services, and accommodations through the post-secondary institution of their choice.    a. Mr. Porras would benefit from taking exams alone, in a quiet room.  b. Mr. Porras would benefit from extended exam time.  c. Mr. Porras would benefit from note taking devices/services or audio recording of lectures.  d. Mr. Porras would benefit from use of a fidget device or the ability move around the classroom as long as they are not disturbing others.  e. Mr. Proras would benefit from extensions on course work due dates.  f. Mr. Porras may benefit from student support for coping with a disability in college.  g. Mr. Porras may benefit from help with time management and organizational skills.  h. Mr. Porras may benefit from assistance with advocating to his instructors and departments, their needs and accommodations.     2. Mr. Porras would likely benefit from engaging in medication management services. They were recommended to follow their provider s treatment guidelines and recommendations. If their diagnoses are beyond the scope of practice for their primary care provider, they should be recommended to a psychiatric provider.    3.   Chiquita likely would benefit from engaging in individual therapy.     Jerica Schwartz, PhD LP  August 8, 2022      Psychological Testing Bill/Service Summary:  Interview/Assessment Date Time   Interview 3/22/2022; 3/29/2022; 4/27/2022 5349-9738 (1); [phone] (.75); [phone] (.75)  Previously billed   Documentation 3/22/2022; 3/29/2022; 4/27/2022 4035-0567 (.25); [phone] (.25); 8679-7832 (.5)  Previously billed   Testing Evaluation 27163- 1st 60 mins 57448- each add 60   Record Review & Clarify Referral Question     Clinical Decision Making     Patient Symptom Management     Battery Modification     Integration/ Report Generation 6/05/2022; 6/06/2022 8267-2099 (.5); 0056-6229 (1)   Feedback Session 6/13/[phone] (.5)   Post-Service Work 6/13/[phone] (.25)   Total Time 2.25    Total Units 1 Previously billed 1 Previously billed   Integration/ Report Generation 6/21/2022 5735-4817(.5)   Feedback Session 6/27/2022 4211-8912 (.5)   Post-Service Work 6/27/2022 3939-2822 (.25)   Total Time 1.25    Total Units 1 Previously billed    Integration/ Report Generation 8/02/2022; 8/08/2022 7987-4250 (.25); 915-930 (.25)   Feedback Session 8/08/2022 7328-2302 (.5)   Post-Service Work 8/08/2022 0   Total Time 1    Total Units 1    Test Administration 06890- 1st 30 mins 43052- each add 30    Test Administration (Face) 8/02/2022 6758-8902 (1.5)     Scoring 8/02/2022 8544-8461 (.25)   Total Time 1.75    Total Units 1 3   Diagnoses MDD, INDY, ADHD

## 2022-08-08 NOTE — PROGRESS NOTES
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 observed in comparison to those reported by the client. Norms and percentile scores are not available for collateral reports.      Current Symptoms Scale- Self Report   Mr. Porras completed the self-report inventory of current symptoms. His Total ADHD Score was 53 which placed him in the 98th percentile for overall ADHD symptoms. He endorsed 8/9 Inattention symptoms, 4/9 Hyperactivity-Impulsivity symptoms, and 6/9 Sluggish Cognitive Tempo symptoms. He indicated that symptoms have resulted in impaired functioning at school, home, and work as well as in social relationships.      Current Symptoms Scale- Other Report   Mr. Porras s partner completed the collateral inventory of current symptoms. The Total ADHD Score was 43 which placed him in the 95th percentile for overall ADHD symptoms. His partner endorsed 7/9 Inattention symptoms, 1/9 Hyperactivity-Impulsivity symptoms, and 4/9 Sluggish Cognitive Tempo symptoms. Mr. Porras s " "partner indicated that symptoms have impaired his functioning at school, home, and work as well as in social relationships.     Childhood Symptoms Scale- Self-Report  Mr. Porras completed the self-report inventory of childhood symptoms. His Total ADHD Score was 58 which placed him in the 98th percentile for overall ADHD symptoms in childhood. He endorsed 7/9 Inattention symptoms and 6/9 Hyperactivity-Impulsivity symptoms. Mr. Porras indicated that symptoms emerged at the age of 6 years old and impaired his functioning at school and home as well as social relationships.        Childhood Scales- Other Report  Mr. Porras s father completed the collateral inventory of childhood symptoms. The Total ADHD Score was 72 which placed him in the 99th percentile for overall ADHD symptoms. His father endorsed 9/9 Inattention symptoms and 9/9 Hyperactivity-Impulsivity symptoms. Mr. Porras s father did not indicate at what age symptoms emerged yet reported that symptoms impaired his functioning at school and home as well as social relationships.                             Renata Functional Impairment Scale: Self and Other Reports (BFIS-LF)  The BFIS is used to assess an individuals' psychosocial impairment in major life/daily activities that may be due to a mental health disorder. This assessment measure includes self and collateral rating scales. Self-report scores are reported as percentiles. Scores at the 76th-83rd percentile are considered marginal, scores at the 84th-92nd percentile are considered borderline, scores at the 93rd-95th percentile are considered mild, scores at the 96th-98th percentile are considered moderate, and those at the 99th percentile are considered severe. Collateral or \"other\" rating scales are reported as number of symptoms observed in comparison to those reported by the client. Norms and percentile scores are not available for collateral reports.      Mr. Porras earned a Mean " "Impairment Score of 7.7 (98th percentile). She reported significant deficits in the areas of home-family, home-chores, work, social-strangers, social-friends, community activities, romantic relationships, money management, driving, organization, self-care and maintaining health. His partner completed the collateral form; the mean score was not clinically significant. She outlined significant deficits in the areas of home-chores, work, driving, self-care and maintaining health.      Renata Deficits in Executive Functioning Scale (BDEFS)  The BDEFS is a measure used for evaluating dimensions of adult executive functioning in daily life. This assessment measure includes self and collateral rating scales. Self-report scores are reported as percentiles. Scores at the 76th-83rd percentile are considered marginal, scores at the 84th-92nd percentile are considered borderline, scores at the 93rd-95th percentile are considered mild, scores at the 96th-98th percentile are considered moderate, and those at the 99th percentile are considered severe. Collateral or \"other\" rating scales are reported as number of symptoms observed in comparison to those reported by the client. Norms and percentile scores are not available for collateral reports.     Mr. Porras earned a Total Executive Functioning Score of 270 (99th percentile). The ADHD-Executive Functioning Index score was 34 (99th percentile). He reported significant deficits in all areas of executive functioning. His partner completed the collateral form; the total score was clinically significant. She outlined significant deficits in the areas of time management, organization, problem solving, self-restraint, and motivation.    [place] Multiphasic Personality Inventory-2nd Edition  First developed in the 1930's, the [place] Multiphasic Personality Inventory is a complex psychological instrument designed to measure personality characteristics of adults including mental " illnesses, behaviors, thought patterns, strengths, and weaknesses. Several validity scales have been incorporated into the test in order to measure respondents  approach to the testing environment. In addition, ten standard clinical sub-scales are used to identify problem areas, problem intensity, and behaviors associated with identified characteristics. The MMPI-II, the most recent version of the instrument, was refined in the 1990's and adds additional strengths in terms of validity analysis and additional clinical sub-scales It should be noted the MMPI-II is regarded as one aspect only of a thorough diagnostic assessment and it was not normed with a standardized population including Native Americans.      Mr. Porras was remotely administered the MMPI-2 on 04/28/2022 through the [facility]. The validity scales indicated the protocol was valid. The clinical, restructure, content, supplementary and PSY-5 scales showed elevation on items at that measures somatic complaints, depressive and anxiety symptoms, relationship issues, introverted tendencies, feelings of demoralization, cynical attitudes, antisocial behavior, dysfunctional negative emotions, hypomanic activation, obsessions, anger, low self-esteem, social discomfort, work troubles, substance abuse, and traumatic experiences. Individuals with profiles like Mr. Chiquita milligan generally present with a moderate level of emotional distress characterized by dysphoria, agitation, and anhedonia. They often feel resentful and angry, and have difficulty controlling or expressing their anger appropriately. In response to stress, these men are likely to either withdraw completely or to act out their angry impulses. They tend to feel isolated, rejected, and unwanted. They may feel threatened by the world around them. They generally find it very difficult to get things done and have little hope of being successful  even if they could get motivative to do something. Individuals with profiles like Mr. Chiquita milligan often have trouble concentrating and focusing on tasks. They tend to exhibit poor judgement and may be unpredictable and impulsive. These men are likely to think their thoughts and dreams are best kept to themselves. They are generally suspicious of the motives of others. They are often introverted and have difficulty with close, emotional relationships as they lack social skills. These men may be emotionally distant because they believe no one understands them or their problems. Their feelings of rejection may lead to hostility and conflict which only exacerbates their feelings of alienation.    CNS Vital Signs Neurocognitive Battery  The CNS Vital Signs Neurocognitive Battery is a remotely-administered assessment comprised of seven core subtests to individually measure the patient's verbal memory, visual memory, motor speed, psychomotor speed, reaction time, focus, ability to sustain attention and ability to adapt to changing rules and tasks.     Mr. Porras was remotely administered the CNS Vital Signs on 06/13/2022 through the [facility]. Her results are detailed below. Of note, above average domain scores indicate a standard score (SS) greater than 109 or a Percentile Rank (SC) greater than 74, indicating a high functioning test subject. Average is a SS  or SC 25-74, indicating normal function. Low Average is a SS 80-89 or SC 9-24 indicating a slight deficit or impairment. Below Average is a SS 70-79 or SC 2-8, indicating a moderate level of deficit or impairment. Very Low is a SS less than 70 or a SC less than 2, indicating a deficit and impairment. Validity Indicator denotes a guideline for representing the possibility of an invalid test or domain score, and can be influenced by patient understanding, effort, or other conditions. To confirm a  diagnosis of ADHD, focus is given to the Complex Attention, Cognitive Flexibility, Processing Speed, Executive Function and Simple Attention scales which are most sensitive to the condition.       The Neurocognitive Index (NCI) measures an average score derived from the domain scores or a general assessment of the overall neurocognitive status of the patient. Mr. Porras earned an NCI score of 96 (40th percentile) which placed him within the average range; there was indication that this index may be invalid due invalid results on two subtests.    The Composite Memory scale measures how well subject can recognize, remember, and retrieve words and geometric figures, and is comprised of the Visual and Verbal Memory domains. Mr. Porras earned a Composite Memory score of 115 (84th percentile) which placed him within the above average range.     The Verbal Memory scale measures how well subject can recognize, remember, and retrieve words. Mr. Porras earned a Verbal Memory score of 106 (66th percentile) which placed him within the average range.    The Visual Memory scale measures how well subject can recognize, remember and retrieve geometric figures. Mr. Porras earned a visual memory score of 119 (90th percentile) which placed him within the above average range.    The Psychomotor Speed scale measures how well a subject perceives, attends, responds to complex visual-perceptual information and performs simple fine motor coordination, and is comprised of the Motor Speed and Processing Speed indexes. Mr. Porras earned a Psychomotor Speed score of 105 (63rd percentile) which placed him within the average range.     The Reaction Time scale measures how quickly the subject can react, in milliseconds, to a simple and increasingly complex direction set. Mr. Porras s Reaction Time score was 72 (3rd percentile) which placed him within the low range; there was indication that his measure may be  invalid.    The Complex Attention scale measures the ability to track and respond to a variety of stimuli over lengthy periods of time and/or perform complex mental tasks requiring vigilance quickly and accurately. Mr. Porras earned a Complex Attention score of 94 (34th percentile) which placed him within the average range; there was indication that this measure may be invalid.     The Cognitive Flexibility scale measures how well subject can adapt to rapidly changing and increasingly complex set of directions and/or to manipulate the information. Mr. Chiquita milligan Cognitive Flexibility score was 96 (40th percentile) which placed him within the average range; there was indicated that this measure may be invalid.     The Processing Speed scale measures how well a subject recognizes and processes information i.e., perceiving, attending/responding to incoming information, motor speed, fine motor coordination, and visual-perceptual ability. Mr. Chiquita milligan Processing Speed score was 85 (16th percentile) which placed him within the low average range.     The Executive Function scale measures how well a subject recognizes rules, categories, and manages or navigates rapid decision making. Mr. Chiquita milligan Executive Function was 94 (34th percentile) which placed him within the average range.    The Simple Attention scale measures the ability to track and respond to a single defined stimulus over lengthy periods of time while performing vigilance and response inhibition quickly and accurately to a simple task. Mr. Chiquita milligan Simple Attention score was 60 (1st percentile) which placed him in the very low range.     The Motor Speed scale measures the ability to perform simple movements to produce and satisfy an intention towards a manual action and goal. Mr. Chiquita milligan Motor Speed score was 112 (79th percentile) which placed him within the above average range.     Domain Standard Score Percentile Description Validity    Neurocognitive Index 96 40 Average No   Composite Memory Measure 115 84 Above average Yes   Verbal Memory 106 66 Average Yes   Visual Memory 119 90 Above average Yes   Psychomotor Speed 105 63 Average Yes   Reaction Time 72 3 Low No   *Complex Attention 94 34 Average No   *Cognitive Flexibility 96 40 Average No   *Processing Speed 85 16 Low Average Yes   *Executive Function 94 34 Average Yes   *Simple Attention 60 1 Very Low Yes   Motor Speed 112 79 Above average Yes     Wechsler Adult Intelligence Scales - Fourth Edition   The Wechsler Adult Intelligence Scales - Fourth Edition (WAIS - IV) consists of several subtests, each measuring a different aspect of intelligence. This instrument yields a Full-Scale IQ and four composite index scores - Verbal Comprehension, Perceptual Reasoning, Working Memory, and Processing Speed. When available, test scores are provided with 95% confidence intervals, that is, the probability is 95% that despite measurement error, the actual score falls within the given range. It should be understood that when confidence intervals are not provided, the score given does not take into account the possibility of measurement error. The actual level of ability could be somewhat higher or somewhat lower than the obtained score.      Mr. Porras was administered the WAIS-IV on 08/02/2022 by this writer to ascertain her current level of cognitive functioning. The Full-Scale IQ (FSIQ) on the WAIS-IV provides an overall estimate of general level of intellectual functioning. Mr. Porras earned an FSIQ score of 120 (91st percentile) which placed him within the superior average range; with a 95% confidence interval, his true score likely falls between 116 and 124. Of note, there was a 13-point spread between the VCI and CON his score may underestimate his abilities.     The Verbal Comprehension Index (VCI) provides a measure of verbal comprehension, knowledge and reasoning, and long-term verbal  memory. Mr. Porras earned a score of 122 (93rd percentile) placed him within the superior average range; with a 95% confidence interval, his true score likely falls between 115 and 127.    The Perceptual Reasoning Index (CON) provides a measure of nonverbal, fluid reasoning, attentiveness to detail, and visuomotor integration. It requires a person to meet new situations and apply experience and previously acquired skills to a new set of demands. Mr. Porras earned a score of 109 (73rd percentile) placed him within the average range; with a 95% confidence interval, his true score likely falls between 102 and 115.    The Working Memory Index (WMI) provides a measure of the ability to attend to information, to hold briefly and process that information in memory, and to formulate a response. Mr. Porras earned a score of 122 (93rd percentile) placed him within the superior range; with a 95% confidence interval, his true score likely falls between 114 and 127    The Processing Speed Index (PSI) measures the ability to process visual information quickly, Mr. Porras earned a score of 111 (77th percentile) placed him within the high average range; with a 95% confidence interval, his true score likely falls between 102 and 118    Index Standard Score Percentile Confidence Interval   Verbal Comprehension 122 93 115-127   Perceptual Reasoning 109 73 102-115   Working Memory 122 93 114-127   Processing Speed 111 77 102-118   Intelligence Quotient 120 91 116-124     Trail Making Test   The Trail Making Test (TMT) provides information on visual search, scanning, speed of processing, mental flexibility, and executive functions. The test consists of two parts, Trail A and [place] B, and the score represents the amount of time required to complete the task.     Mr. Porras was administered the TMT, [place]ford A & [place]ford B, on 08/02/2022 by this writer. He completed [place] A in 20.2 seconds (Mean= 24.4, SD= 8.7) which was  comparable to the average score for individuals between the ages of 25 and 34 years old. He completed [place] B in 47.9 seconds (Mean= 50.6, SD= 12.3) which also comparable as compared to peers.     Patient Health Questionnaire 9- Item Scale  The PHQ-9 is a multipurpose instrument for screening, diagnosing, monitoring and measuring the severity of depression. It incorporates the diagnostic criterion for a depressive disorder within a brief self-report tool.     Mr. Porras completed the PHQ-9 on 04/27/2022 and on 08/02/2022. He earned scores of 19 and 12 which placed him within the severe to moderate range. He endorsed all instrument items at the time of the initial administration.     Generalized Anxiety Disorder 7-Item Scale   The INDY-7 is a multipurpose instrument for screening, diagnosing, monitoring and measuring the severity of anxiety. It incorporates the diagnostic criterion for Generalized Anxiety Disorder yet is sensitive to other anxiety disorders within a brief self-report tool.       Mr. Porras completed the INDY-7 on 04/27/2022 and 08/02/2022. He earned scores of 14 and 10 which placed him within the moderate range. He endorsed all instrument items at the time of each administration    Diagnostic Impressions:  Major Depressive Disorder, Recurrent, Moderate  Generalized Anxiety Disorder   Mr. Porras presented with depressive and anxiety symptoms. He disclosed has been nail/skin biting since he was 13 years old. He did not recall an awareness of feeling anxious until high school. He added that depressive symptoms emerged in high school. He admitted he did not want to get up to go to school yet was forced to get there on time. He highlighted that school was mentally taxing and asserted he did not want to be there. He acknowledged he became socially withdrawn. He stated he struggled with suicidal ideations and was psychiatrically hospitalized at the age of 17 years old. He indicated that with  services, therapy and mediation management, mental health symptoms remitted. He highlighted that symptoms returned within 6 months because he began abusing cannabis. He reported that when he was prescribed Wellbutrin in the year 2016, symptoms remitted for a time but then he stopped the medication. He outlined constant depressive symptoms since the year 2016 because he does not consistently take his medication. He acknowledged currently feeling depressed and struggling with anxiety. He disclosed he has been engaging in more nail biting and binge eating since he stopped medication. He denied recent suicidal ideations.     Attention Deficit Hyperactivity Disorder, Combined Type   Mr. Porras met criterion for ADHD. He recalled symptoms emerging at the age of 6 years old. He indicated that as a youth, he had difficulty organizing, cleaning his room, finishing chores, getting ready for school, being on time for class, attending class, starting and completing homework, remembering school items, independently initiating his work, following the rules, getting along with others, and managing his emotions. Mr. Porras s Childhood Symptoms forms from the Renata Reports, as completed by him and his father were clinically significant. In adulthood, Mr. Porras indicated he has been struggling to focus and complete tasks at home and work. He acknowledged that at work he is often late, frequently makes mistakes, is easily bored and distracted, poorly manages time, has trouble organizing and problems learning new tasks/information. The results of the remaining Renata Reports as completed by him and his partner were mixed. He outlined clinically significant impairment from ADHD symptoms in all areas of functioning while his partner outlined impairment in current and executive functioning. When administered the CNS Vital Sign, although scores were mostly average, all scales associated with ADHD including Complex  Attention, Cognitive Flexibility, Processing Speed, Executive Function and Simple Attention showed clinically significant deficits as compared to his overall cognitive abilities which are estimated to fall within the superior range (memory is mostly intact with slower processing speed at times).     Recommendations:    1. Mr. Porras likely would benefit from receiving supportive services, tutoring services, and accommodations through the post-secondary institution of their choice.    a. Mr. Porras would benefit from taking exams alone, in a quiet room.  b. Mr. Porras would benefit from extended exam time.  c. Mr. Porras would benefit from note taking devices/services or audio recording of lectures.  d. Mr. Porras would benefit from use of a fidget device or the ability move around the classroom as long as they are not disturbing others.  e. Mr. Porras would benefit from extensions on course work due dates.  f. Mr. Porras may benefit from student support for coping with a disability in college.  g. Mr. Porras may benefit from help with time management and organizational skills.  h. Mr. Porras may benefit from assistance with advocating to his instructors and departments, their needs and accommodations.     2. Mr. Porras would likely benefit from engaging in medication management services. They were recommended to follow their provider s treatment guidelines and recommendations. If their diagnoses are beyond the scope of practice for their primary care provider, they should be recommended to a psychiatric provider.    3. Mr. Porras likely would benefit from engaging in individual therapy.     Psychological Testing Bill/Service Summary:  Interview/Assessment Date Time   Interview 3/22/2022; 3/29/2022; 4/27/2022 [id number] (1); [phone] (.75); [phone] (.75)  Previously billed   Documentation 3/22/2022; 3/29/2022; 4/27/2022 0774-5474 (.25); [phone] (.25); [id number]  (.5)  Previously billed   Testing Evaluation 78839- 1st 60 mins 85982- each add 60   Record Review & Clarify Referral Question     Clinical Decision Making     Patient Symptom Management     Battery Modification     Integration/ Report Generation 6/05/2022; 6/06/2022 6017-8452 (.5); 7410-7408 (1)   Feedback Session 6/13/[phone] (.5)   Post-Service Work 6/13/[phone] (.25)   Total Time 2.25    Total Units 1 Previously billed 1 Previously billed   Integration/ Report Generation 6/21/2022 7102-2924(.5)   Feedback Session 6/27/[phone] (.5)   Post-Service Work 6/27/2022 6686-0478 (.25)   Total Time 1.25    Total Units 1 Previously billed    Integration/ Report Generation 8/02/2022; 8/08/2022 9963-2305 (.25); [phone] (.25)   Feedback Session 8/08/2022 6511-6094 (.5)   Post-Service Work 8/08/2022 0   Total Time 1    Total Units 1    Test Administration 09295- 1st 30 mins 28782- each add 30    Test Administration (Face) 8/02/2022 4531-2193 (1.5)     Scoring 8/02/2022 8877-5300 (.25)   Total Time 1.75    Total Units 1 3   Diagnoses MDD, INDY, ADHD

## 2022-08-09 ENCOUNTER — VIRTUAL VISIT (OUTPATIENT)
Dept: FAMILY MEDICINE | Facility: CLINIC | Age: 32
End: 2022-08-09
Payer: COMMERCIAL

## 2022-08-09 DIAGNOSIS — F90.2 ATTENTION DEFICIT HYPERACTIVITY DISORDER (ADHD), COMBINED TYPE: Primary | ICD-10-CM

## 2022-08-09 PROCEDURE — 99214 OFFICE O/P EST MOD 30 MIN: CPT | Mod: 95 | Performed by: NURSE PRACTITIONER

## 2022-08-09 RX ORDER — LISDEXAMFETAMINE DIMESYLATE 20 MG/1
20 CAPSULE ORAL EVERY MORNING
Qty: 30 CAPSULE | Refills: 0 | Status: SHIPPED | OUTPATIENT
Start: 2022-08-09 | End: 2022-12-21

## 2022-08-09 NOTE — PROGRESS NOTES
Godfrey is a 32 year old who is being evaluated via a billable video visit.      How would you like to obtain your AVS? [id number]  If the video visit is dropped, the invitation should be resent by: Text to cell phone: [phone]  Will anyone else be joining your video visit? No      ..}   Assessment & Plan      Attention deficit hyperactivity disorder (ADHD), combined type  Start Vyvanse at 20 mg daily. Check in via [employer] 2 weeks to update on progress. Let us know if adverse effects. Once stable on dose complete CSA and UDS.  - lisdexamfetamine (VYVANSE) 20 MG capsule  Dispense: 30 capsule; Refill: 0    See patient instructions.    No follow-ups on file.       ROMMEL Arteaga [facility] [place]       BMI:   Estimated body mass index is 25.55 kg/m  as calculated from the following:    Height as of 7/19/22: 1.829 m (6').    Weight as of 7/19/22: 85.5 kg (188 lb 6.4 oz).       No follow-ups on file.    ROMMEL Arteaga [facility] [place]    Subjective   Godfrey is a 32 year old, presenting for the following health issues:  A.D.H.D      HPI     -ADHD - discuss Adhd diagnosis-psychology notes available on chart.  -Socially affecting as well. Hard to focus on conversations with partner.   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  -Adderall (age 12-13) was taken, and then Concerta. Concerta (16-17) worked better but insurance issue at the time and quit taking.     Review of Systems   Constitutional, HEENT, cardiovascular, pulmonary, GI, , musculoskeletal, neuro, skin, endocrine and psych systems are negative, except as otherwise noted.      Objective           Vitals:  No vitals were obtained today due to virtual visit.    Physical Exam   GENERAL: Healthy, alert and no distress  EYES: Eyes grossly normal to inspection.  No discharge or erythema, or obvious scleral/conjunctival abnormalities.  RESP: No audible wheeze, cough, or visible cyanosis.  No visible  retractions or increased work of breathing.    SKIN: Visible skin clear. No significant rash, abnormal pigmentation or lesions.  NEURO: Cranial nerves grossly intact.  Mentation and speech appropriate for age.  PSYCH: Mentation appears normal, affect normal/bright, judgement and insight intact, normal speech and appearance well-groomed.          Video-Visit Details    Video Start Time: 815    Type of service:  Video Visit    Video End Time:8:42 AM    Originating Location (pt. Location): Home    Distant Location (provider location):  [facility]     Platform used for Video Visit: [employer]    .  ..

## 2022-09-08 ENCOUNTER — VIRTUAL VISIT (OUTPATIENT)
Dept: FAMILY MEDICINE | Facility: CLINIC | Age: 32
End: 2022-09-08
Payer: COMMERCIAL

## 2022-09-08 DIAGNOSIS — F90.2 ATTENTION DEFICIT HYPERACTIVITY DISORDER (ADHD), COMBINED TYPE: Primary | ICD-10-CM

## 2022-09-08 PROCEDURE — 99213 OFFICE O/P EST LOW 20 MIN: CPT | Mod: 95 | Performed by: NURSE PRACTITIONER

## 2022-09-08 RX ORDER — LISDEXAMFETAMINE DIMESYLATE 20 MG/1
20 CAPSULE ORAL DAILY
Qty: 30 CAPSULE | Refills: 0 | Status: SHIPPED | OUTPATIENT
Start: 2022-10-09 | End: 2022-11-08

## 2022-09-08 RX ORDER — LISDEXAMFETAMINE DIMESYLATE 20 MG/1
20 CAPSULE ORAL DAILY
Qty: 30 CAPSULE | Refills: 0 | Status: SHIPPED | OUTPATIENT
Start: 2022-09-08 | End: 2022-10-08

## 2022-09-08 RX ORDER — LISDEXAMFETAMINE DIMESYLATE 20 MG/1
20 CAPSULE ORAL DAILY
Qty: 30 CAPSULE | Refills: 0 | Status: SHIPPED | OUTPATIENT
Start: 2022-11-09 | End: 2022-12-09

## 2022-09-08 ASSESSMENT — ASTHMA QUESTIONNAIRES
QUESTION_2 LAST FOUR WEEKS HOW OFTEN HAVE YOU HAD SHORTNESS OF BREATH: ONCE A DAY
QUESTION_5 LAST FOUR WEEKS HOW WOULD YOU RATE YOUR ASTHMA CONTROL: SOMEWHAT CONTROLLED
QUESTION_1 LAST FOUR WEEKS HOW MUCH OF THE TIME DID YOUR ASTHMA KEEP YOU FROM GETTING AS MUCH DONE AT WORK, SCHOOL OR AT HOME: NONE OF THE TIME
QUESTION_4 LAST FOUR WEEKS HOW OFTEN HAVE YOU USED YOUR RESCUE INHALER OR NEBULIZER MEDICATION (SUCH AS ALBUTEROL): ONE OR TWO TIMES PER DAY
ACT_TOTALSCORE: 17
QUESTION_3 LAST FOUR WEEKS HOW OFTEN DID YOUR ASTHMA SYMPTOMS (WHEEZING, COUGHING, SHORTNESS OF BREATH, CHEST TIGHTNESS OR PAIN) WAKE YOU UP AT NIGHT OR EARLIER THAN USUAL IN THE MORNING: NOT AT ALL
ACT_TOTALSCORE: 17

## 2022-09-08 ASSESSMENT — ANXIETY QUESTIONNAIRES
7. FEELING AFRAID AS IF SOMETHING AWFUL MIGHT HAPPEN: SEVERAL DAYS
IF YOU CHECKED OFF ANY PROBLEMS ON THIS QUESTIONNAIRE, HOW DIFFICULT HAVE THESE PROBLEMS MADE IT FOR YOU TO DO YOUR WORK, TAKE CARE OF THINGS AT HOME, OR GET ALONG WITH OTHER PEOPLE: SOMEWHAT DIFFICULT
GAD7 TOTAL SCORE: 6
8. IF YOU CHECKED OFF ANY PROBLEMS, HOW DIFFICULT HAVE THESE MADE IT FOR YOU TO DO YOUR WORK, TAKE CARE OF THINGS AT HOME, OR GET ALONG WITH OTHER PEOPLE?: SOMEWHAT DIFFICULT
GAD7 TOTAL SCORE: 6
6. BECOMING EASILY ANNOYED OR IRRITABLE: SEVERAL DAYS
1. FEELING NERVOUS, ANXIOUS, OR ON EDGE: SEVERAL DAYS
GAD7 TOTAL SCORE: 6
7. FEELING AFRAID AS IF SOMETHING AWFUL MIGHT HAPPEN: SEVERAL DAYS
4. TROUBLE RELAXING: SEVERAL DAYS
5. BEING SO RESTLESS THAT IT IS HARD TO SIT STILL: NOT AT ALL
3. WORRYING TOO MUCH ABOUT DIFFERENT THINGS: SEVERAL DAYS
2. NOT BEING ABLE TO STOP OR CONTROL WORRYING: SEVERAL DAYS

## 2022-09-08 ASSESSMENT — PATIENT HEALTH QUESTIONNAIRE - PHQ9
SUM OF ALL RESPONSES TO PHQ QUESTIONS 1-9: 10
10. IF YOU CHECKED OFF ANY PROBLEMS, HOW DIFFICULT HAVE THESE PROBLEMS MADE IT FOR YOU TO DO YOUR WORK, TAKE CARE OF THINGS AT HOME, OR GET ALONG WITH OTHER PEOPLE: SOMEWHAT DIFFICULT
SUM OF ALL RESPONSES TO PHQ QUESTIONS 1-9: 10

## 2022-09-08 NOTE — PROGRESS NOTES
Godfrey is a 32 year old who is being evaluated via a billable video visit.      How would you like to obtain your AVS? MyChart  If the video visit is dropped, the invitation should be resent by: Text to cell phone: [phone]  Will anyone else be joining your video visit? No        Assessment & Plan     Attention deficit hyperactivity disorder (ADHD), combined type  - lisdexamfetamine (VYVANSE) 20 MG capsule  Dispense: 30 capsule; Refill: 0  - lisdexamfetamine (VYVANSE) 20 MG capsule  Dispense: 30 capsule; Refill: 0  - lisdexamfetamine (VYVANSE) 20 MG capsule  Dispense: 30 capsule; Refill: 0      Prescription drug management       BMI:   Estimated body mass index is 25.55 kg/m  as calculated from the following:    Height as of 7/19/22: 1.829 m (6').    Weight as of 7/19/22: 85.5 kg (188 lb 6.4 oz).       Return in about 6 months (around 3/8/2023) for Medication recheck.    Gely Bedoya [facility]   Godfrey is a 32 year old, presenting for the following health issues:  Video Visit      History of Present Illness       Reason for visit:  ADHD medication follow up    He eats 2-3 servings of fruits and vegetables daily.He consumes 0 sweetened beverage(s) daily.He exercises with enough effort to increase his heart rate 20 to 29 minutes per day.  He exercises with enough effort to increase his heart rate 7 days per week. He is missing 1 dose(s) of medications per week.  He is not taking prescribed medications regularly due to cost of medication and remembering to take.    Today's PHQ-9         PHQ-9 Total Score: 10    PHQ-9 Q9 Thoughts of better off dead/self-harm past 2 weeks :   Not at all    How difficult have these problems made it for you to do your work, take care of things at home, or get along with other people: Somewhat difficult  Today's INDY-7 Score: 6     PHQ 6/13/2022 8/2/2022 9/8/2022   PHQ-9 Total Score 15 12 10   Q9: Thoughts of better off dead/self-harm past 2  weeks Not at all Not at all Not at all   F/U: Thoughts of suicide or self-harm - - -   F/U: Self harm-plan - - -   F/U: Self-harm action - - -   F/U: Safety concerns - - -       INDY-7 SCORE 6/13/2022 8/2/2022 9/8/2022   Total Score 14 (moderate anxiety) - 6 (mild anxiety)   Total Score 14 10 6         Working well, wants to continue at same dose.        Review of Systems   Constitutional, HEENT, cardiovascular, pulmonary, GI, , musculoskeletal, neuro, skin, endocrine and psych systems are negative, except as otherwise noted.      Objective           Vitals:  No vitals were obtained today due to virtual visit.    Physical Exam   GENERAL: Healthy, alert and no distress  EYES: Eyes grossly normal to inspection.  No discharge or erythema, or obvious scleral/conjunctival abnormalities.  RESP: No audible wheeze, cough, or visible cyanosis.  No visible retractions or increased work of breathing.    SKIN: Visible skin clear. No significant rash, abnormal pigmentation or lesions.  NEURO: Cranial nerves grossly intact.  Mentation and speech appropriate for age.  PSYCH: Mentation appears normal, affect normal/bright, judgement and insight intact, normal speech and appearance well-groomed.                Video-Visit Details    Video Start Time: 348    Type of service:  Video Visit    Video End Time:3:57 PM    Originating Location (pt. Location): Home    Distant Location (provider location):  [facility]     Platform used for Video Visit: [employer]

## 2022-09-30 ENCOUNTER — OFFICE VISIT (OUTPATIENT)
Dept: URGENT CARE | Facility: URGENT CARE | Age: 32
End: 2022-09-30
Payer: COMMERCIAL

## 2022-09-30 VITALS
SYSTOLIC BLOOD PRESSURE: 138 MMHG | WEIGHT: 196.2 LBS | HEART RATE: 82 BPM | DIASTOLIC BLOOD PRESSURE: 79 MMHG | BODY MASS INDEX: 26.61 KG/M2 | TEMPERATURE: 97.2 F | OXYGEN SATURATION: 97 %

## 2022-09-30 DIAGNOSIS — R07.81 RIB PAIN ON LEFT SIDE: Primary | ICD-10-CM

## 2022-09-30 DIAGNOSIS — S20.212A CONTUSION OF RIB ON LEFT SIDE, INITIAL ENCOUNTER: ICD-10-CM

## 2022-09-30 PROCEDURE — 99214 OFFICE O/P EST MOD 30 MIN: CPT | Performed by: PHYSICIAN ASSISTANT

## 2022-09-30 ASSESSMENT — ENCOUNTER SYMPTOMS
CHILLS: 0
SORE THROAT: 0
HEMATURIA: 0
FREQUENCY: 0
CARDIOVASCULAR NEGATIVE: 1
NAUSEA: 0
GASTROINTESTINAL NEGATIVE: 1
ALLERGIC/IMMUNOLOGIC NEGATIVE: 1
CONSTITUTIONAL NEGATIVE: 1
COUGH: 0
MYALGIAS: 0
RESPIRATORY NEGATIVE: 1
ABDOMINAL PAIN: 0
NEUROLOGICAL NEGATIVE: 1
DYSURIA: 0
VOMITING: 0
SHORTNESS OF BREATH: 0
WHEEZING: 0
FEVER: 0
CHEST TIGHTNESS: 0
PALPITATIONS: 0
HEADACHES: 0
MUSCULOSKELETAL NEGATIVE: 1
DIARRHEA: 0

## 2022-09-30 NOTE — PROGRESS NOTES
Chief Complaint:    Chief Complaint   Patient presents with     Rib Pain     Pt fell on Sunday, hurt left side of ribs. No swelling/bruising     Medical Decision Making:    Vital signs reviewed by Hipolito Velásquez PA-C  /79 (BP Location: Left arm, Patient Position: Sitting, Cuff Size: Adult Regular)   Pulse 82   Temp 97.2  F (36.2  C) (Tympanic)   Wt 89 kg (196 lb 3.2 oz)   SpO2 97%   BMI 26.61 kg/m      Differential Diagnosis:  Rib contusion, rib fracture     ASSESSMENT:     1. Rib pain on left side    2. Contusion of rib on left side, initial encounter           PLAN:     Patient is in no acute distress.  Lung sounds were clear and O2 sats at 97% on RA.  XR of the L ribs was negative for any acute fracture per my read.    Ice the affected area.  Ibuprofen and or Tylenol for pain.    Limit activities and lifting that cause pain.    Patient instructed to follow up with PCP in 1 week if symptoms are not improving.  Sooner if symptoms worsen.  Worrisome symptoms discussed with instructions to go to the ED.  Patient verbalized understanding and agreed with this plan.    Labs:     Results for orders placed or performed in visit on 09/30/22   XR Ribs & Chest Left G/E 3 Views     Status: None (Preliminary result)    Narrative    RIBS AND CHEST LEFT 3 VIEWS  DATE/TIME: 9/30/2022 3:59 PM     INDICATION: Left-sided rib pain.  COMPARISON: 12/15/2014.      Impression    IMPRESSION: No left rib fracture. Unremarkable heart and lungs.       Current Meds:    Current Outpatient Medications:      albuterol (PROAIR HFA/PROVENTIL HFA/VENTOLIN HFA) 108 (90 Base) MCG/ACT inhaler, Inhale 2 puffs into the lungs every 4 hours as needed for asthma symptoms., Disp: 8.5 g, Rfl: 2     buPROPion (WELLBUTRIN XL) 300 MG 24 hr tablet, Take 1 tablet (300 mg) by mouth every morning for depression & anxiety prevention., Disp: 90 tablet, Rfl: 1     fexofenadine (ALLEGRA) 180 MG tablet, Take 180 mg by mouth daily, Disp: , Rfl:       fluticasone-salmeterol (ADVAIR HFA) 115-21 MCG/ACT inhaler, Inhale 2 puffs into the lungs 2 times daily for asthma prevention., Disp: 36 g, Rfl: 1     lisdexamfetamine (VYVANSE) 20 MG capsule, Take 1 capsule (20 mg) by mouth daily for 30 days, Disp: 30 capsule, Rfl: 0     [START ON 10/9/2022] lisdexamfetamine (VYVANSE) 20 MG capsule, Take 1 capsule (20 mg) by mouth daily for 30 days, Disp: 30 capsule, Rfl: 0     [START ON 11/9/2022] lisdexamfetamine (VYVANSE) 20 MG capsule, Take 1 capsule (20 mg) by mouth daily for 30 days, Disp: 30 capsule, Rfl: 0     lisdexamfetamine (VYVANSE) 20 MG capsule, Take 1 capsule (20 mg) by mouth every morning, Disp: 30 capsule, Rfl: 0    Allergies:  Allergies   Allergen Reactions     Amoxicillin Rash     Penicillins Rash       SUBJECTIVE    HPI: Godfrey Porras is an 32 year old male who presents for evaluation and treatment of L sided rib pain following fall 5 days ago.  Patient tripped and fell onto the L side.  He complains of L rib pain that is worse with some movements.  He denies any SOB, or hemoptysis.  He has not tried anything for the symptoms.      ROS:      Review of Systems   Constitutional: Negative.  Negative for chills and fever.   HENT: Negative.  Negative for sore throat.    Respiratory: Negative.  Negative for cough, chest tightness, shortness of breath and wheezing.         Rib Pain   Cardiovascular: Negative.  Negative for chest pain and palpitations.   Gastrointestinal: Negative.  Negative for abdominal pain, diarrhea, nausea and vomiting.   Genitourinary: Negative for dysuria, frequency, hematuria and urgency.   Musculoskeletal: Negative.  Negative for myalgias.   Skin: Negative for rash.   Allergic/Immunologic: Negative.  Negative for immunocompromised state.   Neurological: Negative.  Negative for headaches.        Family History   Family History   Problem Relation Age of Onset     Depression Mother      Asthma Father      Breast Cancer Maternal Grandmother       Depression Maternal Grandmother      Myocardial Infarction Paternal Grandfather 61        multiple,      Depression Maternal Aunt      Hypertension No family hx of      Cerebrovascular Disease No family hx of      Diabetes No family hx of        Social History  Social History     Socioeconomic History     Marital status: Single     Spouse name: Not on file     Number of children: 0     Years of education: Not on file     Highest education level: Not on file   Occupational History     Occupation:      Employer: SELF EMPLOYED.   Tobacco Use     Smoking status: Former Smoker     Packs/day: 1.00     Years: 5.00     Pack years: 5.00     Types: Cigarettes     Quit date: 2012     Years since quitting: 10.4     Smokeless tobacco: Never Used     Tobacco comment: 2.5 years   Substance and Sexual Activity     Alcohol use: Yes     Alcohol/week: 4.0 - 6.0 standard drinks     Types: 4 - 6 Standard drinks or equivalent per week     Comment: Fri and Sat     Drug use: Yes     Frequency: 3.0 times per week     Types: Marijuana     Comment: 2-3     Sexual activity: Yes     Partners: Female     Birth control/protection: Pill, Condom   Other Topics Concern      Service No     Blood Transfusions No     Caffeine Concern No     Occupational Exposure No     Hobby Hazards No     Sleep Concern No     Stress Concern No     Weight Concern No     Special Diet No     Back Care No     Exercise No     Bike Helmet No     Seat Belt Yes     Self-Exams No     Parent/sibling w/ CABG, MI or angioplasty before 65F 55M? Yes     Comment: Maternal Grandpa- bypass heart surgery   Social History Narrative     Not on file     Social Determinants of Health     Financial Resource Strain: Not on file   Food Insecurity: Not on file   Transportation Needs: Not on file   Physical Activity: Not on file   Stress: Not on file   Social Connections: Not on file   Intimate Partner Violence: Not on file   Housing Stability: Not on file        Surgical  History:  Past Surgical History:   Procedure Laterality Date     Left hand surgery for finger fractures Left 2007    3 carpal bones with pins (2 removed)        Problem List:  Patient Active Problem List   Diagnosis     ADD (attention deficit disorder) without hyperactivity     Marijuana use     Major depressive disorder, recurrent episode, moderate (H)     INDY (generalized anxiety disorder)     Mild persistent asthma without complication           OBJECTIVE:     Vital signs noted and reviewed by Hipolito Velásquez PA-C  /79 (BP Location: Left arm, Patient Position: Sitting, Cuff Size: Adult Regular)   Pulse 82   Temp 97.2  F (36.2  C) (Tympanic)   Wt 89 kg (196 lb 3.2 oz)   SpO2 97%   BMI 26.61 kg/m       PEFR:    Physical Exam  Vitals and nursing note reviewed.   Constitutional:       General: He is not in acute distress.     Appearance: He is well-developed. He is not ill-appearing, toxic-appearing or diaphoretic.   HENT:      Head: Normocephalic and atraumatic.      Right Ear: Hearing, tympanic membrane, ear canal and external ear normal. Tympanic membrane is not perforated, erythematous, retracted or bulging.      Left Ear: Hearing, tympanic membrane, ear canal and external ear normal. Tympanic membrane is not perforated, erythematous, retracted or bulging.      Nose: Nose normal. No mucosal edema, congestion or rhinorrhea.      Mouth/Throat:      Pharynx: No oropharyngeal exudate or posterior oropharyngeal erythema.      Tonsils: No tonsillar exudate or tonsillar abscesses. 0 on the right. 0 on the left.   Eyes:      Pupils: Pupils are equal, round, and reactive to light.   Cardiovascular:      Rate and Rhythm: Normal rate and regular rhythm.      Heart sounds: Normal heart sounds, S1 normal and S2 normal. Heart sounds not distant. No murmur heard.    No friction rub. No gallop.   Pulmonary:      Effort: Pulmonary effort is normal. No respiratory distress.      Breath sounds: Normal breath sounds. No  decreased breath sounds, wheezing, rhonchi or rales.   Chest:      Chest wall: Tenderness present. No deformity, swelling, crepitus or edema. There is no dullness to percussion.   Breasts:      Left: No swelling.            Comments: Lateral rib pain more inferior.    Abdominal:      General: Bowel sounds are normal. There is no distension.      Palpations: Abdomen is soft.      Tenderness: There is no abdominal tenderness.   Musculoskeletal:      Cervical back: Normal range of motion and neck supple.   Lymphadenopathy:      Cervical: No cervical adenopathy.   Skin:     General: Skin is warm and dry.      Findings: No rash.   Neurological:      Mental Status: He is alert.      Cranial Nerves: No cranial nerve deficit.   Psychiatric:         Attention and Perception: He is attentive.         Speech: Speech normal.         Behavior: Behavior normal. Behavior is cooperative.         Thought Content: Thought content normal.         Judgment: Judgment normal.             Hipolito Velásquez PA-C  9/30/2022, 3:02 PM

## 2022-10-15 ENCOUNTER — HEALTH MAINTENANCE LETTER (OUTPATIENT)
Age: 32
End: 2022-10-15

## 2022-11-10 ENCOUNTER — TELEPHONE (OUTPATIENT)
Dept: FAMILY MEDICINE | Facility: CLINIC | Age: 32
End: 2022-11-10

## 2022-11-10 NOTE — TELEPHONE ENCOUNTER
Patient Quality Outreach    Patient is due for the following:   Asthma  -  ACT needed    Next Steps:   No follow up needed at this time.    Type of outreach:    Copy of ACT mailed to patient.      Questions for provider review:    None     Jose Eduardo Villa MA

## 2022-12-20 DIAGNOSIS — F90.2 ATTENTION DEFICIT HYPERACTIVITY DISORDER (ADHD), COMBINED TYPE: ICD-10-CM

## 2022-12-20 RX ORDER — LISDEXAMFETAMINE DIMESYLATE 20 MG/1
20 CAPSULE ORAL EVERY MORNING
Qty: 30 CAPSULE | Refills: 0 | Status: CANCELLED | OUTPATIENT
Start: 2022-12-20

## 2022-12-20 NOTE — TELEPHONE ENCOUNTER
Patient called in for refill of Vyvanse. Appointment on 1/9/22. Will run out on on 12/25/22.    Effie Peterson RN [facility]

## 2022-12-20 NOTE — TELEPHONE ENCOUNTER
Routing refill request to provider for review/approval because:  Drug not on the [facility] refill protocol     LOV:  9/8/22    Next 5 appointments (look out 90 days)      Jan 19, 2023  1:30 PM  (Arrive by 1:10 PM)  Provider Visit with Gely Bedoya CNP  [facility] [address] [place] [address] [id number] [phone]             CSA -- Patient Level:    CSA: None found at the patient level.         Effie CASANOVA  [facility]

## 2022-12-21 RX ORDER — LISDEXAMFETAMINE DIMESYLATE 20 MG/1
20 CAPSULE ORAL EVERY MORNING
Qty: 30 CAPSULE | Refills: 0 | Status: SHIPPED | OUTPATIENT
Start: 2022-12-21 | End: 2023-01-19

## 2022-12-27 ENCOUNTER — TELEPHONE (OUTPATIENT)
Dept: FAMILY MEDICINE | Facility: CLINIC | Age: 32
End: 2022-12-27

## 2022-12-27 NOTE — TELEPHONE ENCOUNTER
Patient calling to ask about prescription refill for Vyvanse. Advised sent to pharmacy on 12/21/22.  Effie Peterson RN [facility]

## 2023-01-06 DIAGNOSIS — J45.40 MODERATE PERSISTENT ASTHMA WITHOUT COMPLICATION: ICD-10-CM

## 2023-01-06 NOTE — TELEPHONE ENCOUNTER
.Reason for call:  Medication   If this is a refill request, has the caller requested the refill from the pharmacy already? No  Will the patient be using a [facility]? No  Name of the pharmacy and phone number for the current request[facility] [address]    Name of the medication requested: albuterol    Other request: fill script, pt has appt on 1/19/23 with another provider.     Phone number to reach patient:  Home number on file [phone] (home)    Best Time:  anytime    Can we leave a detailed message on this number?  YES    Travel screening: Negative

## 2023-01-07 RX ORDER — ALBUTEROL SULFATE 90 UG/1
2 AEROSOL, METERED RESPIRATORY (INHALATION) EVERY 4 HOURS PRN
Qty: 8.5 G | Refills: 0 | Status: SHIPPED | OUTPATIENT
Start: 2023-01-07 | End: 2023-01-19

## 2023-01-07 NOTE — TELEPHONE ENCOUNTER
Ok for the one inhaler. Per Dr Shane.    The patient has NOT been doing the advair as he forgets to take this.    Gave the patient some tips and tricks to try and remember as this will greatly help with this overall asmtha control.    Also advised the patient to keep his appointment with Gely Bedoya this week.    Sarita Jacobs RN  [place] Nurse Advisor  11:55 AM  1/7/2023

## 2023-01-07 NOTE — TELEPHONE ENCOUNTER
"Routing refill request to provider for review/approval because:  ACT score not up to date.    Last Written Prescription Date:  6/23/22  Last Fill Quantity: 8.5g,  # refills: 2   Last office visit provider:  9/8/22         Plan:     MD consult, Dr. Marlo hernandez Paged by RN at 11:46 AM    Reason for page: needs refill of albuterol      Requested Prescriptions   Pending Prescriptions Disp Refills     albuterol (PROAIR HFA/PROVENTIL HFA/VENTOLIN HFA) 108 (90 Base) MCG/ACT inhaler 8.5 g 2     Sig: Inhale 2 puffs into the lungs every 4 hours as needed for asthma symptoms.       Asthma Maintenance Inhalers - Anticholinergics Failed - 1/6/2023 10:56 AM        Failed - Asthma control assessment score within normal limits in last 6 months     Please review ACT score.           Passed - Patient is age 12 years or older        Passed - Medication is active on med list        Passed - Recent (6 mo) or future (30 days) visit within the authorizing provider's specialty     Patient had office visit in the last 6 months or has a visit in the next 30 days with authorizing provider or within the authorizing provider's specialty.  See \"Patient Info\" tab in inbasket, or \"Choose Columns\" in Meds & Orders section of the refill encounter.           Short-Acting Beta Agonist Inhalers Protocol  Failed - 1/6/2023 10:56 AM        Failed - Asthma control assessment score within normal limits in last 6 months     Please review ACT score.           Passed - Patient is age 12 or older        Passed - Medication is active on med list        Passed - Recent (6 mo) or future (30 days) visit within the authorizing provider's specialty     Patient had office visit in the last 6 months or has a visit in the next 30 days with authorizing provider or within the authorizing provider's specialty.  See \"Patient Info\" tab in inbasket, or \"Choose Columns\" in Meds & Orders section of the refill encounter.                 Sarita Jacobs RN 01/07/23 11:33 " AM

## 2023-01-19 ENCOUNTER — OFFICE VISIT (OUTPATIENT)
Dept: FAMILY MEDICINE | Facility: CLINIC | Age: 33
End: 2023-01-19
Payer: COMMERCIAL

## 2023-01-19 VITALS
RESPIRATION RATE: 12 BRPM | OXYGEN SATURATION: 100 % | DIASTOLIC BLOOD PRESSURE: 72 MMHG | WEIGHT: 190 LBS | TEMPERATURE: 98.2 F | HEIGHT: 72 IN | HEART RATE: 89 BPM | SYSTOLIC BLOOD PRESSURE: 128 MMHG | BODY MASS INDEX: 25.73 KG/M2

## 2023-01-19 DIAGNOSIS — J45.40 MODERATE PERSISTENT ASTHMA WITHOUT COMPLICATION: ICD-10-CM

## 2023-01-19 DIAGNOSIS — F90.2 ATTENTION DEFICIT HYPERACTIVITY DISORDER (ADHD), COMBINED TYPE: ICD-10-CM

## 2023-01-19 DIAGNOSIS — F33.1 MAJOR DEPRESSIVE DISORDER, RECURRENT EPISODE, MODERATE (H): Primary | ICD-10-CM

## 2023-01-19 LAB
AMPHETAMINES UR QL: DETECTED
BARBITURATES UR QL SCN: NOT DETECTED
BENZODIAZ UR QL SCN: NOT DETECTED
BUPRENORPHINE UR QL: NOT DETECTED
CANNABINOIDS UR QL: DETECTED
COCAINE UR QL SCN: NOT DETECTED
D-METHAMPHET UR QL: NOT DETECTED
METHADONE UR QL SCN: NOT DETECTED
OPIATES UR QL SCN: NOT DETECTED
OXYCODONE UR QL SCN: NOT DETECTED
PCP UR QL SCN: NOT DETECTED
PROPOXYPH UR QL: NOT DETECTED
TRICYCLICS UR QL SCN: NOT DETECTED

## 2023-01-19 PROCEDURE — 99214 OFFICE O/P EST MOD 30 MIN: CPT | Performed by: NURSE PRACTITIONER

## 2023-01-19 PROCEDURE — 80306 DRUG TEST PRSMV INSTRMNT: CPT | Performed by: NURSE PRACTITIONER

## 2023-01-19 RX ORDER — VENLAFAXINE HYDROCHLORIDE 37.5 MG/1
37.5 CAPSULE, EXTENDED RELEASE ORAL DAILY
Qty: 30 CAPSULE | Refills: 3 | Status: SHIPPED | OUTPATIENT
Start: 2023-01-19 | End: 2023-03-03

## 2023-01-19 RX ORDER — LISDEXAMFETAMINE DIMESYLATE 10 MG/1
10 CAPSULE ORAL EVERY MORNING
Qty: 10 CAPSULE | Refills: 0 | Status: SHIPPED | OUTPATIENT
Start: 2023-01-19 | End: 2023-02-24

## 2023-01-19 RX ORDER — ALBUTEROL SULFATE 90 UG/1
2 AEROSOL, METERED RESPIRATORY (INHALATION) EVERY 4 HOURS PRN
Qty: 8.5 G | Refills: 0 | Status: SHIPPED | OUTPATIENT
Start: 2023-01-19 | End: 2023-02-17

## 2023-01-19 RX ORDER — FLUTICASONE PROPIONATE AND SALMETEROL XINAFOATE 115; 21 UG/1; UG/1
2 AEROSOL, METERED RESPIRATORY (INHALATION) 2 TIMES DAILY
Qty: 36 G | Refills: 11 | Status: SHIPPED | OUTPATIENT
Start: 2023-01-19 | End: 2023-07-19

## 2023-01-19 RX ORDER — LISDEXAMFETAMINE DIMESYLATE 20 MG/1
20 CAPSULE ORAL EVERY MORNING
Qty: 20 CAPSULE | Refills: 0 | Status: SHIPPED | OUTPATIENT
Start: 2023-01-19 | End: 2023-02-24

## 2023-01-19 ASSESSMENT — ANXIETY QUESTIONNAIRES
1. FEELING NERVOUS, ANXIOUS, OR ON EDGE: SEVERAL DAYS
7. FEELING AFRAID AS IF SOMETHING AWFUL MIGHT HAPPEN: MORE THAN HALF THE DAYS
GAD7 TOTAL SCORE: 8
GAD7 TOTAL SCORE: 8
2. NOT BEING ABLE TO STOP OR CONTROL WORRYING: SEVERAL DAYS
6. BECOMING EASILY ANNOYED OR IRRITABLE: SEVERAL DAYS
7. FEELING AFRAID AS IF SOMETHING AWFUL MIGHT HAPPEN: MORE THAN HALF THE DAYS
GAD7 TOTAL SCORE: 8
IF YOU CHECKED OFF ANY PROBLEMS ON THIS QUESTIONNAIRE, HOW DIFFICULT HAVE THESE PROBLEMS MADE IT FOR YOU TO DO YOUR WORK, TAKE CARE OF THINGS AT HOME, OR GET ALONG WITH OTHER PEOPLE: SOMEWHAT DIFFICULT
3. WORRYING TOO MUCH ABOUT DIFFERENT THINGS: MORE THAN HALF THE DAYS
4. TROUBLE RELAXING: SEVERAL DAYS
5. BEING SO RESTLESS THAT IT IS HARD TO SIT STILL: NOT AT ALL
8. IF YOU CHECKED OFF ANY PROBLEMS, HOW DIFFICULT HAVE THESE MADE IT FOR YOU TO DO YOUR WORK, TAKE CARE OF THINGS AT HOME, OR GET ALONG WITH OTHER PEOPLE?: SOMEWHAT DIFFICULT

## 2023-01-19 ASSESSMENT — PATIENT HEALTH QUESTIONNAIRE - PHQ9
SUM OF ALL RESPONSES TO PHQ QUESTIONS 1-9: 20
SUM OF ALL RESPONSES TO PHQ QUESTIONS 1-9: 20
10. IF YOU CHECKED OFF ANY PROBLEMS, HOW DIFFICULT HAVE THESE PROBLEMS MADE IT FOR YOU TO DO YOUR WORK, TAKE CARE OF THINGS AT HOME, OR GET ALONG WITH OTHER PEOPLE: VERY DIFFICULT

## 2023-01-19 ASSESSMENT — ASTHMA QUESTIONNAIRES
ACT_TOTALSCORE: 13
QUESTION_1 LAST FOUR WEEKS HOW MUCH OF THE TIME DID YOUR ASTHMA KEEP YOU FROM GETTING AS MUCH DONE AT WORK, SCHOOL OR AT HOME: A LITTLE OF THE TIME
QUESTION_3 LAST FOUR WEEKS HOW OFTEN DID YOUR ASTHMA SYMPTOMS (WHEEZING, COUGHING, SHORTNESS OF BREATH, CHEST TIGHTNESS OR PAIN) WAKE YOU UP AT NIGHT OR EARLIER THAN USUAL IN THE MORNING: TWO OR THREE NIGHTS A WEEK
QUESTION_2 LAST FOUR WEEKS HOW OFTEN HAVE YOU HAD SHORTNESS OF BREATH: ONCE A DAY
QUESTION_4 LAST FOUR WEEKS HOW OFTEN HAVE YOU USED YOUR RESCUE INHALER OR NEBULIZER MEDICATION (SUCH AS ALBUTEROL): ONE OR TWO TIMES PER DAY
QUESTION_5 LAST FOUR WEEKS HOW WOULD YOU RATE YOUR ASTHMA CONTROL: SOMEWHAT CONTROLLED
ACT_TOTALSCORE: 13

## 2023-01-19 ASSESSMENT — PAIN SCALES - GENERAL: PAINLEVEL: NO PAIN (0)

## 2023-01-19 NOTE — PROGRESS NOTES
Assessment & Plan     Major depressive disorder, recurrent episode, moderate (H)  Referral for medication and therapy. Start trial of venlafaxine.   - Adult Mental Health  Referral  - Adult Mental Health  Referral  - venlafaxine (EFFEXOR XR) 37.5 MG 24 hr capsule  Dispense: 30 capsule; Refill: 3    Attention deficit hyperactivity disorder (ADHD), combined type  Continue on Vyvanse at current dose some days when working late will keep him awake. Will allow for lower dose on those days and see if helpful.  - lisdexamfetamine (VYVANSE) 20 MG capsule  Dispense: 20 capsule; Refill: 0  - lisdexamfetamine (VYVANSE) 10 MG capsule  Dispense: 10 capsule; Refill: 0  - Drug Abuse Screen Panel 13, Urine (Pain Care Package) - lab collect  - Drug Abuse Screen Panel 13, Urine (Pain Care Package) - lab collect    Moderate persistent asthma without complication  - albuterol (PROAIR HFA/PROVENTIL HFA/VENTOLIN HFA) 108 (90 Base) MCG/ACT inhaler  Dispense: 8.5 g; Refill: 0  - fluticasone-salmeterol (ADVAIR HFA) 115-21 MCG/ACT inhaler  Dispense: 36 g; Refill: 11      Prescription drug management       BMI:   Estimated body mass index is 25.77 kg/m  as calculated from the following:    Height as of this encounter: 1.829 m (6').    Weight as of this encounter: 86.2 kg (190 lb).       Depression Screening Follow Up    PHQ 1/19/2023   PHQ-9 Total Score 20   Q9: Thoughts of better off dead/self-harm past 2 weeks Several days   F/U: Thoughts of suicide or self-harm No   F/U: Self harm-plan -   F/U: Self-harm action -   F/U: Safety concerns No     Last PHQ-9 1/19/2023   1.  Little interest or pleasure in doing things 3   2.  Feeling down, depressed, or hopeless 3   3.  Trouble falling or staying asleep, or sleeping too much 3   4.  Feeling tired or having little energy 3   5.  Poor appetite or overeating 3   6.  Feeling bad about yourself 1   7.  Trouble concentrating 1   8.  Moving slowly or restless 2   Q9: Thoughts of  better off dead/self-harm past 2 weeks 1   PHQ-9 Total Score 20   Difficulty at work, home, or with people -   In the past two weeks have you had thoughts of suicide or self harm? No   Do you have concerns about your personal safety or the safety of others? No   In the past 2 weeks have you thought about a plan or had intention to harm yourself? -   In the past 2 weeks have you acted on these thoughts in any way? -             Follow Up    Follow Up Actions Taken  Crisis resource information provided in the After Visit Summary  Mental Health Referral placed    Discussed the following ways the patient can remain in a safe environment:  remove alcohol, remove drugs and be around others  See Patient Instructions    Return in about 6 months (around 7/19/2023) for Follow up, Medication recheck.    Gely Bedoya [facility] HARLEEN Donahue is a 32 year old, presenting for the following health issues:  Recheck Medication      History of Present Illness     Asthma:  He presents for follow up of asthma.  He has no cough, no wheezing, and no shortness of breath. He is using a relief medication daily. He typically misses taking his controller medication 2 time(s) per week.Patient is aware of the following triggers: same as previous visit, animal dander and pollens. The patient has not had a visit to the Emergency Room, Urgent Care or Hospital due to asthma since the last clinic visit.     Mental Health Follow-up:  Patient presents to follow-up on Depression & Anxiety.Patient's depression since last visit has been:  Worse  The patient is having other symptoms associated with depression.  Patient's anxiety since last visit has been:  No change  The patient is having other symptoms associated with anxiety.  Any significant life events: relationship concerns, job concerns, financial concerns, housing concerns, grief or loss and health concerns  Patient is feeling anxious or having panic  attacks.  Patient has no concerns about alcohol or drug use.    Reason for visit:  ADHD    He eats 2-3 servings of fruits and vegetables daily.He consumes 0 sweetened beverage(s) daily.He exercises with enough effort to increase his heart rate 9 or less minutes per day.  He exercises with enough effort to increase his heart rate 3 or less days per week. He is missing 2 dose(s) of medications per week.    Today's PHQ-9         PHQ-9 Total Score: 20    PHQ-9 Q9 Thoughts of better off dead/self-harm past 2 weeks :   Several days  Thoughts of suicide or self harm: (P) No  Self-harm Plan:     Self-harm Action:       Safety concerns for self or others: (P) No    How difficult have these problems made it for you to do your work, take care of things at home, or get along with other people: Very difficult  Today's INDY-7 Score: 8     Has medical cannabis card so will show on screening. Noted on CSA.    Review of Systems   Constitutional, HEENT, cardiovascular, pulmonary, GI, , musculoskeletal, neuro, skin, endocrine and psych systems are negative, except as otherwise noted.      Objective    /72   Pulse 89   Temp 98.2  F (36.8  C) (Tympanic)   Resp 12   Ht 1.829 m (6')   Wt 86.2 kg (190 lb)   SpO2 100%   BMI 25.77 kg/m    Body mass index is 25.77 kg/m .  Physical Exam   GENERAL: healthy, alert and no distress  NECK: no adenopathy, no asymmetry, masses, or scars and thyroid normal to palpation  RESP: lungs clear to auscultation - no rales, rhonchi or wheezes  CV: regular rate and rhythm, normal S1 S2, no S3 or S4, no murmur, click or rub, no peripheral edema and peripheral pulses strong  ABDOMEN: soft, nontender, no hepatosplenomegaly, no masses and bowel sounds normal  MS: no gross musculoskeletal defects noted, no edema              SUKHWINDER Hamilton     [address] [address]  [email or web address]   Office: [phone]

## 2023-01-19 NOTE — LETTER
[facility] [place]  01/19/23  Patient: Godfrey Porras  YOB: 1990  Medical Record Number: [id number]                                                                                  Non-Opioid Controlled Substance Agreement    This is an agreement between you and your provider regarding safe and appropriate use of controlled substances prescribed by your care team. Controlled substances are?medicines that can cause physical and mental dependence (abuse).     There are strict laws about having and using these medicines. We here at [facility] are  committed to working with you in your efforts to get better. To support you in this work, we'll help you schedule regular office appointments for medicine refills. If we must cancel or change your appointment for any reason, we'll make sure you have enough medicine to last until your next appointment.     As a Provider, I will:     Listen carefully to your concerns while treating you with respect.     Recommend a treatment plan that I believe is in your best interest and may involve therapies other than medicine.      Talk with you often about the possible benefits and the risk of harm of any medicine that we prescribe for you.    Assess the safety of this medicine and check how well it works.      Provide a plan on how to taper (discontinue or go off) using this medicine if the decision is made to stop its use.      ::  As a Patient, I understand controlled substances:       Are prescribed by my care provider to help me function or work and manage my condition(s).?    Are strong medicines and can cause serious side effects.       Need to be taken exactly as prescribed.?Combining controlled substances with certain medicines or chemicals (such as illegal drugs, alcohol, sedatives, sleeping pills, and benzodiazepines) can be dangerous or even fatal.? If I stop taking my medicines suddenly, I may have severe withdrawal symptoms.     The  risks, benefits, and side effects of these medicine(s) were explained to me. I agree that:    1. I will take part in other treatments as advised by my care team. This may be psychiatry or counseling, physical therapy, behavioral therapy, group treatment or a referral to specialist.    2. I will keep all my appointments and understand this is part of the monitoring of controlled substances.?My care team may require an office visit for EVERY controlled substance refill. If I miss appointments or don t follow instructions, my care team may stop my medicine    3. I will take my medicines as prescribed. I will not change the dose or schedule unless my care team tells me to. There will be no refills if I run out early.      4. I may be asked to come to the clinic and complete a urine drug test or complete a pill count. If I don t give a urine sample or participate in a pill count, the care team may stop my medicine.    5. I will only receive controlled substance prescriptions from this clinic. If I am treated by another provider, I will tell them that I am taking controlled substances and that I have a treatment agreement with this provider. I will inform my [facility] care team within one business day if I am given a prescription for any controlled substance by another healthcare provider. My [facility] care team can contact other providers and pharmacists about my use of any medicines.    6. It is up to me to make sure that I don't run out of my medicines on weekends or holidays.?If my care team is willing to refill my prescription without a visit, I must request refills only during office hours. Refills may take up to 3 business days to process. I will use one pharmacy to fill all my controlled substance prescriptions. I will notify the clinic about any changes to my insurance or medicine availability.    7. I am responsible for my prescriptions. If the medicine/prescription is lost, stolen or destroyed,  it will not be replaced.?I also agree not to share controlled substance medicines with anyone.     8. I am aware I should not use any illegal or recreational drugs. I agree not to drink alcohol unless my care team says I can.     9. If I enroll in the [place] Medical Cannabis program, I will tell my care team before my next refill.    10. I will tell my care team right away if I become pregnant, have a new medical problem treated outside of my regular clinic, or have a change in my medicines.     11. I understand that this medicine can affect my thinking, judgment and reaction time.? Alcohol and drugs affect the brain and body, which can affect the safety of my driving. Being under the influence of alcohol or drugs can affect my decision-making, behaviors, personal safety and the safety of others. Driving while impaired (DWI) can occur if a person is driving, operating or in physical control of a car, motorcycle, boat, snowmobile, ATV, motorbike, off-road vehicle or any other motor vehicle ([place] Statute 169A.20). I understand the risk if I choose to drive or operate any vehicle or machinery.    I understand that if I do not follow any of the conditions above, my prescriptions or treatment may be stopped or changed.   I agree that my provider, clinic care team and pharmacy may work with any city, state or federal law enforcement agency that investigates the misuse, sale or other diversion of my controlled medicine. I will allow my provider to discuss my care with, or share a copy of, this agreement with any other treating provider, pharmacy or emergency room where I receive care.     I have read this agreement and have asked questions about anything I did not understand.    ________________________________________________________  Patient Signature - Godfrey Porras     ___________________                   Date     ________________________________________________________  Provider Signature - Gely Bedoya, CNP        ___________________                   Date     ________________________________________________________  Witness Signature (required if provider not present while patient signing)          ___________________                   Date

## 2023-02-17 DIAGNOSIS — J45.40 MODERATE PERSISTENT ASTHMA WITHOUT COMPLICATION: ICD-10-CM

## 2023-02-17 DIAGNOSIS — F90.2 ATTENTION DEFICIT HYPERACTIVITY DISORDER (ADHD), COMBINED TYPE: ICD-10-CM

## 2023-02-17 RX ORDER — ALBUTEROL SULFATE 90 UG/1
AEROSOL, METERED RESPIRATORY (INHALATION)
Qty: 18 G | Refills: 1 | Status: SHIPPED | OUTPATIENT
Start: 2023-02-17 | End: 2023-03-10

## 2023-02-17 RX ORDER — ALBUTEROL SULFATE 90 UG/1
AEROSOL, METERED RESPIRATORY (INHALATION)
Qty: 18 G | OUTPATIENT
Start: 2023-02-17

## 2023-02-17 NOTE — TELEPHONE ENCOUNTER
Routing refill request to provider for review/approval because:  Asthma Maintenance Inhalers - Anticholinergics Failed 02/17/2023 03:50 PM   Protocol Details  Asthma control assessment score within normal limits in last 6 months     ACT Total Scores 6/23/2022 9/8/2022 1/19/2023   ACT TOTAL SCORE - - -   ASTHMA ER VISITS - - -   ASTHMA HOSPITALIZATIONS - - -   ACT TOTAL SCORE (Goal Greater than or Equal to 20) 19 17 13   In the past 12 months, how many times did you visit the emergency room for your asthma without being admitted to the hospital? 0 0 0   In the past 12 months, how many times were you hospitalized overnight because of your asthma? 0 0 0

## 2023-02-22 NOTE — TELEPHONE ENCOUNTER
Medication Question or Refill    Contacts       Type Contact Phone/Fax    02/17/2023 12:43 PM CST Interface (Incoming) [facility] #[id number] - [address] AT [facility] [phone]          What medication are you calling about (include dose and sig)?: lisdexamfetamine (VYVANSE) 20 MG capsule and lisdexamfetamine (VYVANSE) 10 MG capsule and albuterol inhaler    Preferred Pharmacy:      [facility] #[id number] [address] AT [address] [address]  Phone: [phone] Fax: [phone]      Controlled Substance Agreement on file:   CSA -- Patient Level:    CSA: None found at the patient level.       Who prescribed the medication?: Gely Bedoya CNP    Do you need a refill? Yes    When did you use the medication last? ongoing    Patient offered an appointment? No    Do you have any questions or concerns?  Yes: patient is not sure if he is due for anything      Could we send this information to you in [employer] or would you prefer to receive a phone call?:   Patient would prefer a phone call   Okay to leave a detailed message?: Yes at Home number on file [phone] (home)

## 2023-02-23 ENCOUNTER — TELEPHONE (OUTPATIENT)
Dept: FAMILY MEDICINE | Facility: CLINIC | Age: 33
End: 2023-02-23
Payer: COMMERCIAL

## 2023-02-23 NOTE — TELEPHONE ENCOUNTER
Patient Quality Outreach    Patient is due for the following:   Asthma  -  ACT needed    Next Steps:   Patient was assigned appropriate questionnaire to complete    Type of outreach:    Sent [employer] message.      Questions for provider review:    None     Katharine Best MA

## 2023-02-24 RX ORDER — LISDEXAMFETAMINE DIMESYLATE 10 MG/1
10 CAPSULE ORAL EVERY MORNING
Qty: 10 CAPSULE | Refills: 0 | Status: SHIPPED | OUTPATIENT
Start: 2023-02-24 | End: 2023-04-20

## 2023-02-24 RX ORDER — LISDEXAMFETAMINE DIMESYLATE 20 MG/1
20 CAPSULE ORAL EVERY MORNING
Qty: 20 CAPSULE | Refills: 0 | Status: SHIPPED | OUTPATIENT
Start: 2023-02-24 | End: 2023-04-20

## 2023-02-24 NOTE — TELEPHONE ENCOUNTER
Routing refill request to provider for review/approval because:  Drug not on the [facility] refill protocol     LOV:  1/19/23         CSA -- Patient Level:    CSA: None found at the patient level.         Effie Peterson RN  [facility]

## 2023-03-03 ENCOUNTER — OFFICE VISIT (OUTPATIENT)
Dept: FAMILY MEDICINE | Facility: CLINIC | Age: 33
End: 2023-03-03
Payer: COMMERCIAL

## 2023-03-03 ENCOUNTER — NURSE TRIAGE (OUTPATIENT)
Dept: FAMILY MEDICINE | Facility: CLINIC | Age: 33
End: 2023-03-03
Payer: COMMERCIAL

## 2023-03-03 VITALS
HEART RATE: 92 BPM | TEMPERATURE: 97.8 F | BODY MASS INDEX: 24.95 KG/M2 | DIASTOLIC BLOOD PRESSURE: 61 MMHG | SYSTOLIC BLOOD PRESSURE: 98 MMHG | WEIGHT: 184 LBS | RESPIRATION RATE: 18 BRPM | OXYGEN SATURATION: 97 %

## 2023-03-03 DIAGNOSIS — J45.31 MILD PERSISTENT ASTHMA WITH EXACERBATION: Primary | ICD-10-CM

## 2023-03-03 PROCEDURE — 99213 OFFICE O/P EST LOW 20 MIN: CPT | Performed by: FAMILY MEDICINE

## 2023-03-03 RX ORDER — PREDNISONE 20 MG/1
40 TABLET ORAL
Qty: 10 TABLET | Refills: 0 | Status: SHIPPED | OUTPATIENT
Start: 2023-03-03 | End: 2023-04-19

## 2023-03-03 NOTE — PROGRESS NOTES
OUTPATIENT VISIT NOTE                                                   Date of Visit: 3/3/2023     Chief Complaint   Patient presents with:  Cough: Cough and SOB x 2 weeks             History of Present Illness   Godfrey Porras is a 32 year old male c/o cough and shortness of breath , particularly at night, with exercise and medical marijuana.  Going on for two weeks.  Getting worse.  Cough is productive.  No fever.  Had some laryngitis and stuffy nose at the start.  No ear pain.  No stomach upset.    No known exposure.    H/o asthma.  Has advair inhaler--not using.  Albuterol--using with the coughing spells.  Usually use it in the morning even before this because he is short of breath first thing in the morning. This is worse now.    No tobacco use.       MEDICATIONS   Current Outpatient Medications   Medication     predniSONE (DELTASONE) 20 MG tablet     fluticasone-salmeterol (ADVAIR HFA) 115-21 MCG/ACT inhaler     lisdexamfetamine (VYVANSE) 10 MG capsule     lisdexamfetamine (VYVANSE) 20 MG capsule     VENTOLIN  (90 Base) MCG/ACT inhaler     No current facility-administered medications for this visit.         SOCIAL HISTORY   Social History     Tobacco Use     Smoking status: Former     Packs/day: 1.00     Years: 5.00     Pack years: 5.00     Types: Cigarettes     Quit date: 4/20/2012     Years since quitting: 10.8     Smokeless tobacco: Never     Tobacco comments:     2.5 years   Substance Use Topics     Alcohol use: Yes     Alcohol/week: 4.0 - 6.0 standard drinks     Types: 4 - 6 Standard drinks or equivalent per week     Comment: Fri and Sat           Physical Exam   Vitals:    03/03/23 1532   BP: 98/61   BP Location: Left arm   Patient Position: Sitting   Cuff Size: Adult Large   Pulse: 92   Resp: 18   Temp: 97.8  F (36.6  C)   TempSrc: Oral   SpO2: 97%   Weight: 83.5 kg (184 lb)        GENERAL:   Alert. Oriented.  EYES: Clear  HENT:  Ears: R TM pearly gray. Normal landmarks. L TM pearly gray.   Normal landmarks  Nose: Clear.  Sinuses: Nontender.  Oropharynx:  No erythema. No exudate.  NECK: Supple. No adenopathy.  LUNGS: No crackles. Faint diffuse expiratory wheezing throughout  HEART: RRR  SKIN:  No rash.          Assessment and Plan     Mild persistent asthma with exacerbation  Patient Instructions   Take prednisone as prescribed    Start advair and use twice daily.    Albuterol inhaler instructions given.  2 puffs TID for 10-14 days and prn and then as needed..      If you are still needing to use albuterol every morning, then you need to stay on the advair.    Recheck if not improving within 3-4 weeks.   - predniSONE (DELTASONE) 20 MG tablet  Dispense: 10 tablet; Refill: 0                   Discussed signs / symptoms that warrant urgent / emergent medical attention.     Recheck if worsening or not improving.       Gaby Reilly MD          Pertinent History     The following portions of the patient's history were reviewed and updated as appropriate: allergies, current medications, past family history, past medical history, past social history, past surgical history and problem list.

## 2023-03-03 NOTE — PATIENT INSTRUCTIONS
Take prednisone as prescribed    Start advair and use twice daily.    Albuterol inhaler instructions given.  2 puffs TID for 10-14 days and prn and then as needed..      If you are still needing to use albuterol every morning, then you need to stay on the advair.    Recheck if not improving within 3-4 weeks.

## 2023-03-03 NOTE — TELEPHONE ENCOUNTER
Patient is short of breath, worse than his normal.  He also has a cough. This has been happening when he exercises. For last 2 weeks.    He is also having coughing with the marijuana smoking- this is more than normal.  He has been smoking for 15 years..    He also has asthma.  No fever.    Per protocol patient advised to be seen in the ER. patient agrees with the plan.    Anel Velazquez RN on 3/3/2023 at 8:50 AM      Reason for Disposition    MODERATE difficulty breathing (e.g., speaks in phrases, SOB even at rest, pulse 100-120) of new-onset or worse than normal    Additional Information    Negative: SEVERE difficulty breathing (e.g., struggling for each breath, speaks in single words, pulse > 120)    Negative: Breathing stopped and hasn't returned    Negative: Choking on something    Negative: Bluish (or gray) lips or face    Negative: Difficult to awaken or acting confused (e.g., disoriented, slurred speech)    Negative: Passed out (i.e., fainted, collapsed and was not responding)    Negative: Wheezing started suddenly after medicine, an allergic food, or bee sting    Negative: Stridor    Negative: Slow, shallow and weak breathing    Negative: Sounds like a life-threatening emergency to the triager    Negative: Chest pain    Negative: Wheezing (high pitched whistling sound) and previous asthma attacks or use of asthma medicines    Negative: Difficulty breathing and within 14 days of COVID-19 Exposure    Negative: Difficulty breathing and only present when coughing    Negative: Difficulty breathing and only from stuffy nose    Negative: Difficulty breathing and only from stuffy nose or runny nose from common cold    Protocols used: BREATHING DIFFICULTY-A-OH

## 2023-03-10 DIAGNOSIS — J45.40 MODERATE PERSISTENT ASTHMA WITHOUT COMPLICATION: ICD-10-CM

## 2023-03-10 RX ORDER — ALBUTEROL SULFATE 90 UG/1
AEROSOL, METERED RESPIRATORY (INHALATION)
Qty: 8.5 G | Refills: 11 | Status: SHIPPED | OUTPATIENT
Start: 2023-03-10 | End: 2023-04-21

## 2023-03-14 NOTE — LETTER
My Asthma Action Plan    Name: Godfrey Porras   YOB: 1990  Date: 1/17/2022   My doctor: Jai Noriega MD   My clinic: [facility]        My Control Medicine: Fluticasone propionate (Flovent HFA) - 220 mcg 2 puffs 2 times per day  My Rescue Medicine: Albuterol (Proair/Ventolin/Proventil HFA) 2-4 puffs EVERY 4 HOURS as needed. Use a spacer if recommended by your provider.   My Asthma Severity:   Mild Persistent  Know your asthma triggers:   allergies            GREEN ZONE   Good Control    I feel good    No cough or wheeze    Can work, sleep and play without asthma symptoms       Take your asthma control medicine every day.     1. If exercise triggers your asthma, take your rescue medication    15 minutes before exercise or sports, and    During exercise if you have asthma symptoms  2. Spacer to use with inhaler: If you have a spacer, make sure to use it with your inhaler             YELLOW ZONE Getting Worse  I have ANY of these:    I do not feel good    Cough or wheeze    Chest feels tight    Wake up at night   1. Keep taking your Green Zone medications  2. Start taking your rescue medicine:    every 20 minutes for up to 1 hour. Then every 4 hours for 24-48 hours.  3. If you stay in the Yellow Zone for more than 12-24 hours, contact your doctor.  4. If you do not return to the Green Zone in 12-24 hours or you get worse, start taking your oral steroid medicine if prescribed by your provider.           RED ZONE Medical Alert - Get Help  I have ANY of these:    I feel awful    Medicine is not helping    Breathing getting harder    Trouble walking or talking    Nose opens wide to breathe       1. Take your rescue medicine NOW  2. If your provider has prescribed an oral steroid medicine, start taking it NOW  3. Call your doctor NOW  4. If you are still in the Red Zone after 20 minutes and you have not reached your doctor:    Take your rescue medicine again and    Call  911 or go to the emergency room right away    See your regular doctor within 2 weeks of an Emergency Room or Urgent Care visit for follow-up treatment.          Annual Reminders:  Meet with Asthma Educator,  Flu Shot in the Fall, consider Pneumonia Vaccination for patients with asthma (aged 19 and older).    Pharmacy:    [place], [place] - [address]  [id number] - [place], [place] - [address]    Electronically signed by Jai Noriega MD   Date: 01/17/22                      Asthma Triggers  How To Control Things That Make Your Asthma Worse    Triggers are things that make your asthma worse.  Look at the list below to help you find your triggers and what you can do about them.  You can help prevent asthma flare-ups by staying away from your triggers.      Trigger                                                          What you can do   Cigarette Smoke  Tobacco smoke can make asthma worse. Do not allow smoking in your home, car or around you.  Be sure no one smokes at a child s day care or school.  If you smoke, ask your health care provider for ways to help you quit.  Ask family members to quit too.  Ask your health care provider for a referral to Quit Plan to help you quit smoking, or call 5-961-955-PLAN.     Colds, Flu, Bronchitis  These are common triggers of asthma. Wash your hands often.  Don t touch your eyes, nose or mouth.  Get a flu shot every year.     Dust Mites  These are tiny bugs that live in cloth or carpet. They are too small to see. Wash sheets and blankets in hot water every week.   Encase pillows and mattress in dust mite proof covers.  Avoid having carpet if you can. If you have carpet, vacuum weekly.   Use a dust mask and HEPA vacuum.   Pollen and Outdoor Mold  Some people are allergic to trees, grass, or weed pollen, or molds. Try to keep your windows closed.  Limit time out doors when pollen  [5] : 5 count is high.   Ask you health care provider about taking medicine during allergy season.     Animal Dander  Some people are allergic to skin flakes, urine or saliva from pets with fur or feathers. Keep pets with fur or feathers out of your home.    If you can t keep the pet outdoors, then keep the pet out of your bedroom.  Keep the bedroom door closed.  Keep pets off cloth furniture and away from stuffed toys.     Mice, Rats, and Cockroaches   Some people are allergic to the waste from these pests.   Cover food and garbage.  Clean up spills and food crumbs.  Store grease in the refrigerator.   Keep food out of the bedroom.   Indoor Mold  This can be a trigger if your home has high moisture. Fix leaking faucets, pipes, or other sources of water.   Clean moldy surfaces.  Dehumidify basement if it is damp and smelly.   Smoke, Strong Odors, and Sprays  These can reduce air quality. Stay away from strong odors and sprays, such as perfume, powder, hair spray, paints, smoke incense, paint, cleaning products, candles and new carpet.   Exercise or Sports  Some people with asthma have this trigger. Be active!  Ask your doctor about taking medicine before sports or exercise to prevent symptoms.    Warm up for 5-10 minutes before and after sports or exercise.     Other Triggers of Asthma  Cold air:  Cover your nose and mouth with a scarf.  Sometimes laughing or crying can be a trigger.  Some medicines and food can trigger asthma.        [2] : 2 [Throbbing] : throbbing [Full time] : Work status: full time [de-identified] : 38 yo male with left chest wall pain here for initial exam. He states he was in gym at 8:30am this morning, was doing bench press when he felt a pop.  He did warm up and stretch before starting workup.\par Was on set two- typical amount of weight in a full flexed position- bar to chest and felt a pop to the left chest wall.\par \par Immediately stopped workout- Applied ice this morning after injury. and mde the appointment with us today.\par He had a brief amount of paresthesias on ulnar side of forearm.\par Most of the pain is left pec into left medial humerus area.\par \par Swelling seen but no overt ecchymosis.\par \par Otherwise healthy and well\par \par Takes finesteride for hair loss otherwise no prescription meds.\par \par ALLERGY to ceclor.\par \par \par \par \par Review of Systems: \par Constitutional:  no fever, fatigue or recent weight loss \par HEENT: negative \par CV: negative \par Pulm: negative \par GI: negative \par : negative \par Neuro: negative \par Skin: negative \par Endocrine: negative \par Heme: negative \par MSK: See HPI.\par  [] : no [FreeTextEntry1] : left shoulder [FreeTextEntry7] : down left arm to elbow [de-identified] :

## 2023-03-26 ENCOUNTER — HEALTH MAINTENANCE LETTER (OUTPATIENT)
Age: 33
End: 2023-03-26

## 2023-04-19 ENCOUNTER — OFFICE VISIT (OUTPATIENT)
Dept: FAMILY MEDICINE | Facility: CLINIC | Age: 33
End: 2023-04-19
Payer: COMMERCIAL

## 2023-04-19 VITALS
OXYGEN SATURATION: 98 % | TEMPERATURE: 98 F | WEIGHT: 193 LBS | SYSTOLIC BLOOD PRESSURE: 103 MMHG | RESPIRATION RATE: 18 BRPM | HEART RATE: 72 BPM | DIASTOLIC BLOOD PRESSURE: 69 MMHG | BODY MASS INDEX: 26.18 KG/M2

## 2023-04-19 DIAGNOSIS — N47.8 FORESKIN DOES NOT RETRACT: ICD-10-CM

## 2023-04-19 DIAGNOSIS — F90.2 ATTENTION DEFICIT HYPERACTIVITY DISORDER (ADHD), COMBINED TYPE: ICD-10-CM

## 2023-04-19 DIAGNOSIS — Z11.3 SCREEN FOR STD (SEXUALLY TRANSMITTED DISEASE): ICD-10-CM

## 2023-04-19 DIAGNOSIS — J45.21 MILD INTERMITTENT ASTHMA WITH EXACERBATION: Primary | ICD-10-CM

## 2023-04-19 PROCEDURE — 87491 CHLMYD TRACH DNA AMP PROBE: CPT | Performed by: PHYSICIAN ASSISTANT

## 2023-04-19 PROCEDURE — 87591 N.GONORRHOEAE DNA AMP PROB: CPT | Performed by: PHYSICIAN ASSISTANT

## 2023-04-19 PROCEDURE — 99214 OFFICE O/P EST MOD 30 MIN: CPT | Performed by: PHYSICIAN ASSISTANT

## 2023-04-19 RX ORDER — MONTELUKAST SODIUM 10 MG/1
10 TABLET ORAL AT BEDTIME
Qty: 30 TABLET | Refills: 0 | Status: SHIPPED | OUTPATIENT
Start: 2023-04-19 | End: 2024-07-26 | Stop reason: SINTOL

## 2023-04-19 RX ORDER — FLUTICASONE PROPIONATE AND SALMETEROL 100; 50 UG/1; UG/1
1 POWDER RESPIRATORY (INHALATION) EVERY 12 HOURS
Qty: 1 EACH | Refills: 0 | Status: SHIPPED | OUTPATIENT
Start: 2023-04-19 | End: 2024-07-26

## 2023-04-19 NOTE — PATIENT INSTRUCTIONS
Use your albuterol inhaler with spacer  Start with new Singulair medication for asthma  May use daily inhaled steroid to help control asthma symptoms  Follow-up with your primary care provider in 2 weeks to recheck your treatment progress for your asthma    Referral to urology for concerns about the foreskin.    Abstain from sexual intercourse until your testing is returned.  Use condoms and barrier method to help prevent future STD exposures.  Follow-up with your primary care provider for reevaluation of your symptoms and interpretation of your labs for STD screening.      Patient Education   What Are Sexually Transmitted Diseases (STDs)?  A sexually transmitted disease (STD) is a disease that is spread during sex. (An STD can also be called STI for sexually transmitted infection.) You can become infected with an STD if you have sex with someone who has an STD. Any sex that involves the penis, vagina, anus, or mouth can spread disease. Some STDs spread through body fluids such as semen, vaginal fluid, or blood. Others spread through contact with affected skin.  Who is at risk?     Places on or in the body where STDs cause damage include reproductive organs, the rectum, and the mouth.   It doesn t matter if you re straight or wilson, male or female, young or old. Any person who has sex can get an STD. Your risk increases if:  You have more than one partner. The more partners you have, the greater your risk.  Your partner has other partners. If your partner is exposed to an STD, you could be, too.  You or your partner have had sex with other people in the past. Either of you might be carrying an STD from an earlier partner.  You have an STD. The STD may cause sores or other health problems that increase your risk of new infections. Your risk will stay high unless you change the behaviors that put you at risk of the current infection.  Prevent future problems  Left untreated, certain STDs can lead to cancer or even  death. Some can harm unborn babies whose mothers are infected. Others can cause you to not be able to have children (sterility) or can affect changes in behavior or your ability to think. You can prevent these problems with safer sex, regular checkups, and early treatment. Always use a latex condom when you have sex. Get tested if you re at risk. And get treated early if you have an STD.  Getting checked  The only sure way to know if you have an STD is to get checked by a healthcare provider. If you notice a change in how your body looks or feels, have it checked out. But keep in mind, STDs don t always show symptoms. So if you re at risk of STDs, get checked regularly. If you find you have an STD, be sure your partner gets treatment, too. If not, his or her health is at risk. And left untreated, your partner could pass the STD back to you, or on to others.  Common symptoms  Be alert to any changes in your body and your partner s body. Symptoms may appear in or near the vagina, penis, rectum, mouth, or throat. They include:  Unusual discharge  Lumps, bumps, or rashes  Sores that may be painful, itchy, or painless  Itchy skin  Burning with urination  Pain in the pelvis, belly (abdomen), or rectum  Even if you don t have symptoms  You may have an STD, even if you don t have symptoms. If you think you are at risk, get checked. Go to a clinic or to your healthcare provider. If your partner has an STD, you need to be tested too, even if you feel fine.  Vaccines to prevent disease  Vaccines (also called immunizations) are available to prevent hepatitis A and hepatitis B. These are two kinds of STDs. There is also a vaccine to prevent HPV. This is a virus that can be passed from person to person through sexual contact. Ask your healthcare provider whether any of these vaccines is right for you.   Date Last Reviewed: 11/1/2016 2000-2017 [employer]. [address] [place] [address], [place] [address]. All rights  reserved. This information is not intended as a substitute for professional medical care. Always follow your healthcare professional's instructions.         Patient Education   Understanding STDs    When it comes to sex, nothing is risk-free. Any sexual contact with the penis, vagina, anus, or mouth can spread a sexually transmitted disease (STD). The only sure way to prevent STDs is abstinence (not having sex). But there are ways to make sex safer. Use a latex condom each time you have sex. And choose your partner wisely.  Use condoms for safer sex  If you have sex, latex condoms provide the best protection against STDs. Latex condoms stop the exchange of body fluids that carry certain STDs. They also limit contact with affected skin. Be aware though, a condom doesn t cover all skin. So, affected skin that is not covered can still transfer disease. But you re safer with a condom than without one. Use a condom even if you use other birth control. While birth control methods like the pill or IUD help prevent pregnancy, they do not protect against STDs.  Choose the right condom  Condoms made of latex prevent disease best. If you re allergic to latex, use polyurethane condoms instead. Male condoms fit over the penis. Female condoms line the vagina. Before buying a condom, read the label to be sure it prevents disease. Some novelty condoms don t.  The right lubricant helps  Buy lubricated condoms or use lubricant. This provides greater comfort and reduces the risk of condom breakage. Use only water-based lubricants. Don t use oil, lotion, or petroleum jelly. They can weaken the condom, causing breakage. Also, you may want to choose lubricants without nonoxynol-9. It s now known that this spermicide does not prevent disease and may cause irritation.  Use condoms correctly  For condoms to work, they must be used the right way. Keep these tips in mind:  Use a new latex condom each time you have sex. Slip the condom on the  penis before any contact is made.  When ready to withdraw, hold the rim of the condom as the penis pulls out. This prevents the condom from slipping off.  Check the expiration date before using a condom.  Don t store condoms in places that can get hot, such as a car or a wallet that is carried in a back pocket.  Get to know your partner  Safer sex is a process. It involves getting to know your partner and making informed choices. Ask each other how many partners you have had in the past, and how many you have now. Find out if either of you has an STD. If you decide to have sex, use a condom each time. Don t stop using condoms unless you re sure neither of you has other partners and you ve both been tested to confirm you don t have STDs. Then stay free of disease by having sex only with each other (monogamy).  Keep your cool  Don t let alcohol or drugs cloud your judgment. They could lead you to have sex with someone you wouldn t have chosen if you were sober. Or, you might forget to use a condom. If you do plan to have sex, keep a latex condom with you. Don t wait until you re in the heat of passion to try to find one.  Consider abstinence  The only way to be sure you won t get an STD is to abstain from sex. Abstinence is a choice that many people make at some point in their lives. Maybe you want to wait until you are sure you re ready before you have sex. Maybe you d like a break from the responsibilities of sex for a while. Or maybe you just want to know your partner better before taking the next step. Abstinence is a choice you can make now to protect your future.  Date Last Reviewed: 12/1/2016 2000-2017 [employer]. [address] [place] [address], [place] [address]. All rights reserved. This information is not intended as a substitute for professional medical care. Always follow your healthcare professional's instructions.         Patient Education   If You Think You Have an STD  Treating a sexually  transmitted disease (STD) early limits the problems they can cause. If you have an STD, get treated right away. Ask your partner to be tested, too. Then avoid sex until you ve finished treatment and your healthcare provider says it s OK to have sex again.    Follow your treatment plan  Treatment depends on the type of STD you have. Common treatments include injections and oral pills or liquids. Creams and gels can be applied to sores caused by certain STDs. Follow the tips below:  Get new treatment for each new STD.  Don t use old medicine, even for the same STD. Use medicines as directed.  Don t share medicine unless instructed to do so by your healthcare provider or clinic.  Talk to your partner  If you have an STD, it s your duty to tell all your recent partners so they can be tested and treated. This is one important way to prevent the disease from being spread. Telling a partner that you have an STD can be hard. You may be embarrassed, angry, or afraid. It s often unclear who had the STD first. So try not to place blame. Your healthcare provider may offer some advice on how to begin.  Prevent future problems  Even after you ve been treated, you can still be infected again. This is a common problem. It happens when a partner passes the STD back to you. To avoid this, your partner must be tested. He or she may also need treatment. After treatment, go to any scheduled follow-up visits. Then prevent future problems with safer sex. Limit your number of partners and always use a latex condom.  Diagnosing STDS  Your healthcare provider will take a health history and examine you. During your health history, you will be asked about your sex habits and health history. You may also be asked about drug use. Give honest answers. Your healthcare provider will then check your body for signs of STDs. He or she also may perform one or more of the following tests:  Fluid is swabbed from any sores. Samples also may be taken from  the vagina, penis, mouth, or rectum. The samples are then tested for STDs.  Blood or urine samples may be taken. They are checked for viruses or bacteria that cause STDs.  For women, cells from the cervix (where the vagina and uterus meet) are checked for signs of cancer. This is called a Pap smear. If cell changes are found, a magnifying scope may be used to take a closer look (colposcopy).   Date Last Reviewed: 12/1/2016 2000-2017 [employer]. [address] [place] [address]. All rights reserved. This information is not intended as a substitute for professional medical care. Always follow your healthcare professional's instructions.         Patient Education   Suspected STI, Culture Only  Your symptoms suggest that you may have a sexually transmitted infection (STI). The most common bacteria that cause STIs are chlamydia and gonorrhea. Both are highly contagious. They are passed by sexual contact with an infected partner.  Symptoms begin within 1 to 3 weeks after exposure. There is usually a discharge from the penis or vagina and burning during urination. Many women with one of these infections will have only mild symptoms or no symptoms at all early in the disease.  Culture tests have been taken. These will show if you have an infection with chlamydia or gonorrhea. These infections can be treated and cured with antibiotic medicine.  Home care  Do not have sex until you know that your test result is negative.  If a culture was done, call for the results. If the culture is positive, contact your healthcare provider, local clinic, or local public health department to be treated, or return to our facility.  You will be prescribed antibiotic medicine. Be sure to take all of the antibiotic as prescribed until it is gone or you are told to stop. Keep taking it even if you feel better.  Both you and your sexual partner or partners need to be treated, even if the partner has no symptoms.  Do not  have sex until both you and your partner or partners have finished all antibiotic medicine and you are told that you are no longer contagious.  Learn about  safe sex  practices and use these in the future. The safest sex is with a partner who has tested negative for STIs and only has sex with you. Condoms can help prevent the spread of gonorrhea and chlamydia, but are not a guarantee.  Follow-up care  Follow up with your healthcare provider or as advised by our staff. Call as directed for the results of your culture. If your culture test is positive and you are treated with an antibiotic, you should have another culture taken 4 to 6 weeks after treatment. This is to be sure the infection has cleared. Follow up with your provider or the public health department for complete STI screening, including HIV testing. For more information about STIs, call the CDC information line at 046-944-7011.  When to seek medical advice  Call your healthcare provider if any of these occur:  Fever of 100.4 F (38.0 C) or higher, or as directed  New pain in your lower belly (abdomen) or back or pain that gets worse  Unexpected vaginal bleeding  Weakness, dizziness, or fainting  Repeated vomiting  Inability to urinate because of pain  Rash or joint pain  Painful open sores on the penis or around the outer vagina  Enlarged painful lumps (lymph nodes) in the groin  Testicle pain or scrotal swelling in men  Date Last Reviewed: 9/25/2015 2000-2017 [employer]. [address] [place] [id number]. All rights reserved. This information is not intended as a substitute for professional medical care. Always follow your healthcare professional's instructions.

## 2023-04-19 NOTE — TELEPHONE ENCOUNTER
Patient called to request refills for their VYVANSE (both the 10 mg and 20 mg).    Cintia Jackson

## 2023-04-19 NOTE — PROGRESS NOTES
Patient presents with:  Asthma: Asthma flares   STD: Itching yellow discharge x 2 weeks       Clinical Decision Making:  GC and Chlamydia are pending at time of documentation.  Patient be contacted with test results if positive with appropriate treatment.  Referral to urology for his issue with the foreskin not retracting.  Mild intermittent asthma is treated with MDI spacer dispensed in the office he will use the albuterol and he has plenty of albuterol at home.  Use of the Singulair to help with asthma and allergy symptoms in addition of Advair once daily inhaled steroid.  Follow-up with primary care provider in 2 weeks to recheck your treatment with asthma. Expected course of resolution and indication for return was gone over and questions were answered to patient/parent's satisfaction before discharge.    30 min spent on the date of the encounter in chart review, patient visit, review of tests, documentation and/ordiscussion with other providers about the issues documented above.       ICD-10-CM    1. Mild intermittent asthma with exacerbation  J45.21 Miscellaneous Order for DME - ONLY FOR DME     montelukast (SINGULAIR) 10 MG tablet     fluticasone-salmeterol (ADVAIR) 100-50 MCG/ACT inhaler      2. Screen for STD (sexually transmitted disease)  Z11.3 Chlamydia trachomatis/Neisseria gonorrhoeae by PCR - Clinic Collect      3. Foreskin does not retract  N47.8 Adult Urology  Referral          Patient Instructions     Use your albuterol inhaler with spacer  Start with new Singulair medication for asthma  May use daily inhaled steroid to help control asthma symptoms  Follow-up with your primary care provider in 2 weeks to recheck your treatment progress for your asthma    Referral to urology for concerns about the foreskin.    Abstain from sexual intercourse until your testing is returned.  Use condoms and barrier method to help prevent future STD exposures.  Follow-up with your primary care provider for  reevaluation of your symptoms and interpretation of your labs for STD screening.      Patient Education  What Are Sexually Transmitted Diseases (STDs)?  A sexually transmitted disease (STD) is a disease that is spread during sex. (An STD can also be called STI for sexually transmitted infection.) You can become infected with an STD if you have sex with someone who has an STD. Any sex that involves the penis, vagina, anus, or mouth can spread disease. Some STDs spread through body fluids such as semen, vaginal fluid, or blood. Others spread through contact with affected skin.  Who is at risk?     Places on or in the body where STDs cause damage include reproductive organs, the rectum, and the mouth.   It doesn t matter if you re straight or wilson, male or female, young or old. Any person who has sex can get an STD. Your risk increases if:    You have more than one partner. The more partners you have, the greater your risk.    Your partner has other partners. If your partner is exposed to an STD, you could be, too.    You or your partner have had sex with other people in the past. Either of you might be carrying an STD from an earlier partner.    You have an STD. The STD may cause sores or other health problems that increase your risk of new infections. Your risk will stay high unless you change the behaviors that put you at risk of the current infection.  Prevent future problems  Left untreated, certain STDs can lead to cancer or even death. Some can harm unborn babies whose mothers are infected. Others can cause you to not be able to have children (sterility) or can affect changes in behavior or your ability to think. You can prevent these problems with safer sex, regular checkups, and early treatment. Always use a latex condom when you have sex. Get tested if you re at risk. And get treated early if you have an STD.  Getting checked  The only sure way to know if you have an STD is to get checked by a healthcare  provider. If you notice a change in how your body looks or feels, have it checked out. But keep in mind, STDs don t always show symptoms. So if you re at risk of STDs, get checked regularly. If you find you have an STD, be sure your partner gets treatment, too. If not, his or her health is at risk. And left untreated, your partner could pass the STD back to you, or on to others.  Common symptoms  Be alert to any changes in your body and your partner s body. Symptoms may appear in or near the vagina, penis, rectum, mouth, or throat. They include:    Unusual discharge    Lumps, bumps, or rashes    Sores that may be painful, itchy, or painless    Itchy skin    Burning with urination    Pain in the pelvis, belly (abdomen), or rectum  Even if you don t have symptoms  You may have an STD, even if you don t have symptoms. If you think you are at risk, get checked. Go to a clinic or to your healthcare provider. If your partner has an STD, you need to be tested too, even if you feel fine.  Vaccines to prevent disease  Vaccines (also called immunizations) are available to prevent hepatitis A and hepatitis B. These are two kinds of STDs. There is also a vaccine to prevent HPV. This is a virus that can be passed from person to person through sexual contact. Ask your healthcare provider whether any of these vaccines is right for you.   Date Last Reviewed: 11/1/2016 2000-2017 [employer]. [address] [place] [address]. All rights reserved. This information is not intended as a substitute for professional medical care. Always follow your healthcare professional's instructions.         Patient Education  Understanding STDs    When it comes to sex, nothing is risk-free. Any sexual contact with the penis, vagina, anus, or mouth can spread a sexually transmitted disease (STD). The only sure way to prevent STDs is abstinence (not having sex). But there are ways to make sex safer. Use a latex condom each  time you have sex. And choose your partner wisely.  Use condoms for safer sex  If you have sex, latex condoms provide the best protection against STDs. Latex condoms stop the exchange of body fluids that carry certain STDs. They also limit contact with affected skin. Be aware though, a condom doesn t cover all skin. So, affected skin that is not covered can still transfer disease. But you re safer with a condom than without one. Use a condom even if you use other birth control. While birth control methods like the pill or IUD help prevent pregnancy, they do not protect against STDs.  Choose the right condom  Condoms made of latex prevent disease best. If you re allergic to latex, use polyurethane condoms instead. Male condoms fit over the penis. Female condoms line the vagina. Before buying a condom, read the label to be sure it prevents disease. Some novelty condoms don t.  The right lubricant helps  Buy lubricated condoms or use lubricant. This provides greater comfort and reduces the risk of condom breakage. Use only water-based lubricants. Don t use oil, lotion, or petroleum jelly. They can weaken the condom, causing breakage. Also, you may want to choose lubricants without nonoxynol-9. It s now known that this spermicide does not prevent disease and may cause irritation.  Use condoms correctly  For condoms to work, they must be used the right way. Keep these tips in mind:    Use a new latex condom each time you have sex. Slip the condom on the penis before any contact is made.    When ready to withdraw, hold the rim of the condom as the penis pulls out. This prevents the condom from slipping off.    Check the expiration date before using a condom.    Don t store condoms in places that can get hot, such as a car or a wallet that is carried in a back pocket.  Get to know your partner  Safer sex is a process. It involves getting to know your partner and making informed choices. Ask each other how many partners you  have had in the past, and how many you have now. Find out if either of you has an STD. If you decide to have sex, use a condom each time. Don t stop using condoms unless you re sure neither of you has other partners and you ve both been tested to confirm you don t have STDs. Then stay free of disease by having sex only with each other (monogamy).  Keep your cool  Don t let alcohol or drugs cloud your judgment. They could lead you to have sex with someone you wouldn t have chosen if you were sober. Or, you might forget to use a condom. If you do plan to have sex, keep a latex condom with you. Don t wait until you re in the heat of passion to try to find one.  Consider abstinence  The only way to be sure you won t get an STD is to abstain from sex. Abstinence is a choice that many people make at some point in their lives. Maybe you want to wait until you are sure you re ready before you have sex. Maybe you d like a break from the responsibilities of sex for a while. Or maybe you just want to know your partner better before taking the next step. Abstinence is a choice you can make now to protect your future.  Date Last Reviewed: 12/1/2016 2000-2017 [employer]. [address] [place] [address], [place] [address]. All rights reserved. This information is not intended as a substitute for professional medical care. Always follow your healthcare professional's instructions.         Patient Education  If You Think You Have an STD  Treating a sexually transmitted disease (STD) early limits the problems they can cause. If you have an STD, get treated right away. Ask your partner to be tested, too. Then avoid sex until you ve finished treatment and your healthcare provider says it s OK to have sex again.    Follow your treatment plan  Treatment depends on the type of STD you have. Common treatments include injections and oral pills or liquids. Creams and gels can be applied to sores caused by certain STDs. Follow the  tips below:    Get new treatment for each new STD.    Don t use old medicine, even for the same STD. Use medicines as directed.    Don t share medicine unless instructed to do so by your healthcare provider or clinic.  Talk to your partner  If you have an STD, it s your duty to tell all your recent partners so they can be tested and treated. This is one important way to prevent the disease from being spread. Telling a partner that you have an STD can be hard. You may be embarrassed, angry, or afraid. It s often unclear who had the STD first. So try not to place blame. Your healthcare provider may offer some advice on how to begin.  Prevent future problems  Even after you ve been treated, you can still be infected again. This is a common problem. It happens when a partner passes the STD back to you. To avoid this, your partner must be tested. He or she may also need treatment. After treatment, go to any scheduled follow-up visits. Then prevent future problems with safer sex. Limit your number of partners and always use a latex condom.  Diagnosing STDS  Your healthcare provider will take a health history and examine you. During your health history, you will be asked about your sex habits and health history. You may also be asked about drug use. Give honest answers. Your healthcare provider will then check your body for signs of STDs. He or she also may perform one or more of the following tests:    Fluid is swabbed from any sores. Samples also may be taken from the vagina, penis, mouth, or rectum. The samples are then tested for STDs.    Blood or urine samples may be taken. They are checked for viruses or bacteria that cause STDs.    For women, cells from the cervix (where the vagina and uterus meet) are checked for signs of cancer. This is called a Pap smear. If cell changes are found, a magnifying scope may be used to take a closer look (colposcopy).   Date Last Reviewed: 12/1/2016 2000-2017 [employer]  [employer]. [address] [place] [address]. All rights reserved. This information is not intended as a substitute for professional medical care. Always follow your healthcare professional's instructions.         Patient Education  Suspected STI, Culture Only  Your symptoms suggest that you may have a sexually transmitted infection (STI). The most common bacteria that cause STIs are chlamydia and gonorrhea. Both are highly contagious. They are passed by sexual contact with an infected partner.  Symptoms begin within 1 to 3 weeks after exposure. There is usually a discharge from the penis or vagina and burning during urination. Many women with one of these infections will have only mild symptoms or no symptoms at all early in the disease.  Culture tests have been taken. These will show if you have an infection with chlamydia or gonorrhea. These infections can be treated and cured with antibiotic medicine.  Home care  Do not have sex until you know that your test result is negative.  If a culture was done, call for the results. If the culture is positive, contact your healthcare provider, local clinic, or local public health department to be treated, or return to our facility.    You will be prescribed antibiotic medicine. Be sure to take all of the antibiotic as prescribed until it is gone or you are told to stop. Keep taking it even if you feel better.    Both you and your sexual partner or partners need to be treated, even if the partner has no symptoms.    Do not have sex until both you and your partner or partners have finished all antibiotic medicine and you are told that you are no longer contagious.  Learn about  safe sex  practices and use these in the future. The safest sex is with a partner who has tested negative for STIs and only has sex with you. Condoms can help prevent the spread of gonorrhea and chlamydia, but are not a guarantee.  Follow-up care  Follow up with your healthcare provider or as  advised by our staff. Call as directed for the results of your culture. If your culture test is positive and you are treated with an antibiotic, you should have another culture taken 4 to 6 weeks after treatment. This is to be sure the infection has cleared. Follow up with your provider or the public health department for complete STI screening, including HIV testing. For more information about STIs, call the CDC information line at [phone].  When to seek medical advice  Call your healthcare provider if any of these occur:    Fever of 100.4 F (38.0 C) or higher, or as directed    New pain in your lower belly (abdomen) or back or pain that gets worse    Unexpected vaginal bleeding    Weakness, dizziness, or fainting    Repeated vomiting    Inability to urinate because of pain    Rash or joint pain    Painful open sores on the penis or around the outer vagina    Enlarged painful lumps (lymph nodes) in the groin    Testicle pain or scrotal swelling in men  Date Last Reviewed: 9/25/2015 2000-2017 [employer]. [address] [place] [address]. All rights reserved. This information is not intended as a substitute for professional medical care. Always follow your healthcare professional's instructions.               HPI:  Godfrey Porras is a 32 year old male who presents today for 3 separate concerns.     First concern is that the patient has had difficulty with retraction of his foreskin.  He states he is not able to fully retract it even while masturbating or having sexual intercourse.  This has been a long standing situation and has not changed recently.  Patient states that he gets some itching or irritation around the glans penis around the opening of the foreskin but has not had discharge or redness or drainage.     Second concern is that the patient has had asthma.  He is short of breath when he exercises and has some shortness of breath in the morning as well as dyspnea on  exertion.  He uses his albuterol inhaler but does not have an MDI spacer.  1 month ago he was treated with prednisone with a short course that did improve his symptoms but then had return of the symptoms recently.  Patient does use Allegra over the last 1 month but it is not working for the patient's symptoms.    Last concern is that he has had request for STD screening.  He has had no known exposures and has not currently had symptoms but is requesting screening for GC and chlamydia.    History obtained from chart review and the patient..    Problem List:  2018: Mild persistent asthma without complication  2017: Moderate persistent asthma without complication  2016: Major depressive disorder, recurrent episode, moderate (H)  2016: INDY (generalized anxiety disorder)  2014-10: Marijuana use  2014-10: Mild major depression  2008: Mild persistent asthma  2008: Attention deficit hyperactivity disorder (ADHD)  Mild persistent asthma  ADD (attention deficit disorder) without hyperactivity  Major depression      Past Medical History:   Diagnosis Date     ADD (attention deficit disorder) without hyperactivity     has been on concerta and adderall     INDY (generalized anxiety disorder)      Major depression     since about age 12-13.  [place]icide [place]     Mild persistent asthma     triggers-        Social History     Tobacco Use     Smoking status: Former     Packs/day: 1.00     Years: 5.00     Pack years: 5.00     Types: Cigarettes     Quit date: 2012     Years since quittin.0     Smokeless tobacco: Never     Tobacco comments:     2.5 years   Vaping Use     Vaping status: Not on file   Substance Use Topics     Alcohol use: Yes     Alcohol/week: 4.0 - 6.0 standard drinks of alcohol     Types: 4 - 6 Standard drinks or equivalent per week     Comment: Fri and Sat       Review of Systems  As above in HPI otherwise negative.    Vitals:    23 1606   BP: 103/69   BP Location: Right arm    Patient Position: Sitting   Cuff Size: Adult Large   Pulse: 72   Resp: 18   Temp: 98  F (36.7  C)   TempSrc: Oral   SpO2: 98%   Weight: 87.5 kg (193 lb)       General: Patient is resting comfortably no acute distress is afebrile  HEENT: Head is normocephalic atraumatic   eyes are PERRL EOMI sclera anicteric   LUNGS: Clear to auscultation bilaterally no adventitious breath sounds such as wheezing are heard.  Normal respiratory effort and excursion  HEART: Regular rate and rhythm  Skin: Without rash non-diaphoretic  :  Shows an uncircumcised male the foreskin is retracted to the distal one third of the glans penis does not look to be reactive irritated there is no drainage.  Foreskin does not retract it further      LABS:  GC and Chlamydia are pending.    Physical Exam      At the end of the encounter, I discussed results, diagnosis, medications. Discussed red flags for immediate return to clinic/ER, as well as indications for follow up if no improvement. Patient understood and agreed to plan. Patient was stable for discharge.

## 2023-04-20 LAB
C TRACH DNA SPEC QL PROBE+SIG AMP: NEGATIVE
N GONORRHOEA DNA SPEC QL NAA+PROBE: NEGATIVE

## 2023-04-20 RX ORDER — LISDEXAMFETAMINE DIMESYLATE 10 MG/1
10 CAPSULE ORAL EVERY MORNING
Qty: 10 CAPSULE | Refills: 0 | Status: SHIPPED | OUTPATIENT
Start: 2023-04-20 | End: 2023-04-21

## 2023-04-20 RX ORDER — LISDEXAMFETAMINE DIMESYLATE 20 MG/1
20 CAPSULE ORAL EVERY MORNING
Qty: 20 CAPSULE | Refills: 0 | Status: SHIPPED | OUTPATIENT
Start: 2023-04-20 | End: 2023-04-21

## 2023-04-21 ENCOUNTER — VIRTUAL VISIT (OUTPATIENT)
Dept: FAMILY MEDICINE | Facility: CLINIC | Age: 33
End: 2023-04-21
Payer: COMMERCIAL

## 2023-04-21 DIAGNOSIS — F90.2 ATTENTION DEFICIT HYPERACTIVITY DISORDER (ADHD), COMBINED TYPE: Primary | ICD-10-CM

## 2023-04-21 PROCEDURE — 99213 OFFICE O/P EST LOW 20 MIN: CPT | Mod: VID | Performed by: NURSE PRACTITIONER

## 2023-04-21 RX ORDER — LISDEXAMFETAMINE DIMESYLATE 20 MG/1
20 CAPSULE ORAL EVERY MORNING
Qty: 30 CAPSULE | Refills: 0 | Status: SHIPPED | OUTPATIENT
Start: 2023-04-21 | End: 2023-05-19

## 2023-04-21 ASSESSMENT — ASTHMA QUESTIONNAIRES
QUESTION_2 LAST FOUR WEEKS HOW OFTEN HAVE YOU HAD SHORTNESS OF BREATH: MORE THAN ONCE A DAY
QUESTION_1 LAST FOUR WEEKS HOW MUCH OF THE TIME DID YOUR ASTHMA KEEP YOU FROM GETTING AS MUCH DONE AT WORK, SCHOOL OR AT HOME: SOME OF THE TIME
EMERGENCY_ROOM_LAST_YEAR_TOTAL: TWO
ACT_TOTALSCORE: 8
QUESTION_4 LAST FOUR WEEKS HOW OFTEN HAVE YOU USED YOUR RESCUE INHALER OR NEBULIZER MEDICATION (SUCH AS ALBUTEROL): THREE OR MORE TIMES PER DAY
ACT_TOTALSCORE: 8
QUESTION_5 LAST FOUR WEEKS HOW WOULD YOU RATE YOUR ASTHMA CONTROL: POORLY CONTROLLED
QUESTION_3 LAST FOUR WEEKS HOW OFTEN DID YOUR ASTHMA SYMPTOMS (WHEEZING, COUGHING, SHORTNESS OF BREATH, CHEST TIGHTNESS OR PAIN) WAKE YOU UP AT NIGHT OR EARLIER THAN USUAL IN THE MORNING: FOUR OR MORE NIGHTS A WEEK

## 2023-04-21 ASSESSMENT — PATIENT HEALTH QUESTIONNAIRE - PHQ9: SUM OF ALL RESPONSES TO PHQ QUESTIONS 1-9: 5

## 2023-04-21 NOTE — PROGRESS NOTES
Godfrey is a 32 year old who is being evaluated via a billable video visit.      How would you like to obtain your AVS? MyChart  If the video visit is dropped, the invitation should be resent by: Text to cell phone: [phone]  Will anyone else be joining your video visit? No        Assessment & Plan     Attention deficit hyperactivity disorder (ADHD), combined type  Job change, go back to previous dosing, 20 mg daily.   - lisdexamfetamine (VYVANSE) 20 MG capsule  Dispense: 30 capsule; Refill: 0      Prescription drug management         BMI:   Estimated body mass index is 26.18 kg/m  as calculated from the following:    Height as of 1/19/23: 1.829 m (6').    Weight as of 4/19/23: 87.5 kg (193 lb).           Gely Bedoya, [facility]   Godfrey is a 32 year old, presenting for the following health issues:  Recheck Medication        4/21/2023     2:26 PM   Additional Questions   Roomed by Melly Stevenson   Accompanied by Self     HPI     Godfrey would like to switch the Vyvanse dosages.        Review of Systems   Constitutional, HEENT, cardiovascular, pulmonary, GI, , musculoskeletal, neuro, skin, endocrine and psych systems are negative, except as otherwise noted.      Objective           Vitals:  No vitals were obtained today due to virtual visit.    Physical Exam   GENERAL: Healthy, alert and no distress  EYES: Eyes grossly normal to inspection.  No discharge or erythema, or obvious scleral/conjunctival abnormalities.  RESP: No audible wheeze, cough, or visible cyanosis.  No visible retractions or increased work of breathing.    SKIN: Visible skin clear. No significant rash, abnormal pigmentation or lesions.  NEURO: Cranial nerves grossly intact.  Mentation and speech appropriate for age.  PSYCH: Mentation appears normal, affect normal/bright, judgement and insight intact, normal speech and appearance well-groomed.              Changed to phone visit due to access 7 minutes total  time.

## 2023-05-01 ENCOUNTER — TELEPHONE (OUTPATIENT)
Dept: FAMILY MEDICINE | Facility: CLINIC | Age: 33
End: 2023-05-01
Payer: COMMERCIAL

## 2023-05-01 DIAGNOSIS — J45.40 MODERATE PERSISTENT ASTHMA WITHOUT COMPLICATION: ICD-10-CM

## 2023-05-01 NOTE — TELEPHONE ENCOUNTER
Patient called to request a refill of their ventolin inhaler, which is not on their current med list, although the ADVAIR inhaler is a current prescription. They request that the prescription be sent to the [facility] in [place] on [address].     Cintia Jackson

## 2023-05-03 RX ORDER — ALBUTEROL SULFATE 90 UG/1
2 AEROSOL, METERED RESPIRATORY (INHALATION) EVERY 4 HOURS PRN
Qty: 18 G | Refills: 5 | Status: SHIPPED | OUTPATIENT
Start: 2023-05-03 | End: 2024-07-26

## 2023-05-03 RX ORDER — ALBUTEROL SULFATE 90 UG/1
2 AEROSOL, METERED RESPIRATORY (INHALATION) EVERY 4 HOURS PRN
Qty: 8.5 G | Refills: 0 | Status: SHIPPED | OUTPATIENT
Start: 2023-05-03 | End: 2023-05-03

## 2023-05-03 NOTE — TELEPHONE ENCOUNTER
Called patient and advised of providers note and rx   Patient agreed to plan and stated understanding     Lila SALEH RN   [facility]

## 2023-05-03 NOTE — TELEPHONE ENCOUNTER
9/8/2022     3:21 PM 1/19/2023     1:20 PM 4/21/2023     4:34 PM   ACT Total Scores   ACT TOTAL SCORE (Goal Greater than or Equal to 20) 17 13 8   In the past 12 months, how many times did you visit the emergency room for your asthma without being admitted to the hospital? 0 0 2   In the past 12 months, how many times were you hospitalized overnight because of your asthma? 0 0 0     Last ACT was low and I would recommend inhaled steroid (Advair) and montelukast with close follow-up if not improving.    Albuterol sent

## 2023-05-19 DIAGNOSIS — F90.2 ATTENTION DEFICIT HYPERACTIVITY DISORDER (ADHD), COMBINED TYPE: ICD-10-CM

## 2023-05-19 RX ORDER — LISDEXAMFETAMINE DIMESYLATE 20 MG/1
20 CAPSULE ORAL EVERY MORNING
Qty: 30 CAPSULE | Refills: 0 | Status: SHIPPED | OUTPATIENT
Start: 2023-05-19 | End: 2023-06-29

## 2023-05-26 ENCOUNTER — PRE VISIT (OUTPATIENT)
Dept: UROLOGY | Facility: CLINIC | Age: 33
End: 2023-05-26
Payer: COMMERCIAL

## 2023-05-26 NOTE — TELEPHONE ENCOUNTER
Reason for visit: consult     Dx/Hx/Sx: buried penis     Records/imaging/labs/orders: in epic    At Rooming: standard   Calm

## 2023-05-31 ENCOUNTER — VIRTUAL VISIT (OUTPATIENT)
Dept: PSYCHOLOGY | Facility: CLINIC | Age: 33
End: 2023-05-31
Payer: COMMERCIAL

## 2023-05-31 DIAGNOSIS — F33.2 SEVERE EPISODE OF RECURRENT MAJOR DEPRESSIVE DISORDER, WITHOUT PSYCHOTIC FEATURES (H): ICD-10-CM

## 2023-05-31 DIAGNOSIS — F41.1 GAD (GENERALIZED ANXIETY DISORDER): Primary | ICD-10-CM

## 2023-05-31 PROCEDURE — 90834 PSYTX W PT 45 MINUTES: CPT | Mod: VID

## 2023-05-31 ASSESSMENT — COLUMBIA-SUICIDE SEVERITY RATING SCALE - C-SSRS
TOTAL  NUMBER OF INTERRUPTED ATTEMPTS LIFETIME: NO
3. HAVE YOU BEEN THINKING ABOUT HOW YOU MIGHT KILL YOURSELF?: NO
REASONS FOR IDEATION LIFETIME: COMPLETELY TO END OR STOP THE PAIN (YOU COULDN'T GO ON LIVING WITH THE PAIN OR HOW YOU WERE FEELING)
2. HAVE YOU ACTUALLY HAD ANY THOUGHTS OF KILLING YOURSELF?: YES
1. HAVE YOU WISHED YOU WERE DEAD OR WISHED YOU COULD GO TO SLEEP AND NOT WAKE UP?: YES
4. HAVE YOU HAD THESE THOUGHTS AND HAD SOME INTENTION OF ACTING ON THEM?: NO
2. HAVE YOU ACTUALLY HAD ANY THOUGHTS OF KILLING YOURSELF?: YES
TOTAL  NUMBER OF ABORTED OR SELF INTERRUPTED ATTEMPTS LIFETIME: NO
1. IN THE PAST MONTH, HAVE YOU WISHED YOU WERE DEAD OR WISHED YOU COULD GO TO SLEEP AND NOT WAKE UP?: YES
REASONS FOR IDEATION PAST MONTH: COMPLETELY TO END OR STOP THE PAIN (YOU COULDN'T GO ON LIVING WITH THE PAIN OR HOW YOU WERE FEELING)
ATTEMPT LIFETIME: NO
5. HAVE YOU STARTED TO WORK OUT OR WORKED OUT THE DETAILS OF HOW TO KILL YOURSELF? DO YOU INTEND TO CARRY OUT THIS PLAN?: NO
4. HAVE YOU HAD THESE THOUGHTS AND HAD SOME INTENTION OF ACTING ON THEM?: NO
5. HAVE YOU STARTED TO WORK OUT OR WORKED OUT THE DETAILS OF HOW TO KILL YOURSELF? DO YOU INTEND TO CARRY OUT THIS PLAN?: NO
6. HAVE YOU EVER DONE ANYTHING, STARTED TO DO ANYTHING, OR PREPARED TO DO ANYTHING TO END YOUR LIFE?: NO

## 2023-05-31 ASSESSMENT — ANXIETY QUESTIONNAIRES
8. IF YOU CHECKED OFF ANY PROBLEMS, HOW DIFFICULT HAVE THESE MADE IT FOR YOU TO DO YOUR WORK, TAKE CARE OF THINGS AT HOME, OR GET ALONG WITH OTHER PEOPLE?: SOMEWHAT DIFFICULT
4. TROUBLE RELAXING: MORE THAN HALF THE DAYS
6. BECOMING EASILY ANNOYED OR IRRITABLE: NEARLY EVERY DAY
5. BEING SO RESTLESS THAT IT IS HARD TO SIT STILL: SEVERAL DAYS
GAD7 TOTAL SCORE: 14
2. NOT BEING ABLE TO STOP OR CONTROL WORRYING: MORE THAN HALF THE DAYS
GAD7 TOTAL SCORE: 14
1. FEELING NERVOUS, ANXIOUS, OR ON EDGE: MORE THAN HALF THE DAYS
7. FEELING AFRAID AS IF SOMETHING AWFUL MIGHT HAPPEN: MORE THAN HALF THE DAYS
3. WORRYING TOO MUCH ABOUT DIFFERENT THINGS: MORE THAN HALF THE DAYS
IF YOU CHECKED OFF ANY PROBLEMS ON THIS QUESTIONNAIRE, HOW DIFFICULT HAVE THESE PROBLEMS MADE IT FOR YOU TO DO YOUR WORK, TAKE CARE OF THINGS AT HOME, OR GET ALONG WITH OTHER PEOPLE: SOMEWHAT DIFFICULT
GAD7 TOTAL SCORE: 14
7. FEELING AFRAID AS IF SOMETHING AWFUL MIGHT HAPPEN: MORE THAN HALF THE DAYS

## 2023-05-31 ASSESSMENT — PATIENT HEALTH QUESTIONNAIRE - PHQ9
SUM OF ALL RESPONSES TO PHQ QUESTIONS 1-9: 22
10. IF YOU CHECKED OFF ANY PROBLEMS, HOW DIFFICULT HAVE THESE PROBLEMS MADE IT FOR YOU TO DO YOUR WORK, TAKE CARE OF THINGS AT HOME, OR GET ALONG WITH OTHER PEOPLE: VERY DIFFICULT
SUM OF ALL RESPONSES TO PHQ QUESTIONS 1-9: 22

## 2023-05-31 NOTE — PROGRESS NOTES
[facility]   Mental Health & Addiction Services     Progress Note - Initial Visit    Patient  Name:  Godfrey Porras Date: 23         Service Type: Individual     Visit Start Time: 10:15am  Visit End Time: 10:55am    Visit #: 1    Attendees: Client attended alone    Service Modality:  Video Visit:      Provider verified identity through the following two step process.  Patient provided:  Patient  and Patient address    Telemedicine Visit: The patient's condition can be safely assessed and treated via synchronous audio and visual telemedicine encounter.      Reason for Telemedicine Visit: Patient has requested telehealth visit and Patient convenience (e.g. access to timely appointments / distance to available provider)    Originating Site (Patient Location): Patient's home    Distant Site (Provider Location): Provider Remote Setting- Home Office    Consent:  The patient/guardian has verbally consented to: the potential risks and benefits of telemedicine (video visit) versus in person care; bill my insurance or make self-payment for services provided; and responsibility for payment of non-covered services.     Patient would like the video invitation sent by:  My Chart    Mode of Communication:  Video Conference via [facility]    Distant Location (Provider):  On-site    As the provider I attest to compliance with applicable laws and regulations related to telemedicine.       DATA:   Interactive Complexity: No   Crisis: No     Presenting Concerns/  Current Stressors:   Pt and provider were able to complete the C-SSRS as well as the [id number] assessment before beginning the DA this session. Pt reports symptoms associated with depression and anxiety that have worsened over the last two years: hopeless, feeling down, insomnia for falling asleep and staying asleep, fatigue, poor appetite, feeling bad about self, trouble concentrating, irritability, feeling nervous and on edge, increased figity  "behavior, rumination about many things, trouble relaxing, afraid as if something awful might happen. Pt screened as a Low Risk on the C-SSRS assessment and stated he did \"not need a safety plan because [he] could never do that (completing suicide) to [his] mom or the rest of his family. Pt reports previous symptoms of depression and anxiety throughout various times of his life. Pt reports being dx with ADHD last fall and is inconsistent with his medication adherence. Provider was able to get more background information on pt and will continue DA next session with pt.      ASSESSMENT:  Mental Status Assessment:  Appearance:   Appropriate   Eye Contact:   Good   Psychomotor Behavior: Normal   Attitude:   Cooperative  Interested Pleasant  Orientation:   All  Speech   Rate / Production: Normal/ Responsive   Volume:  Normal   Mood:    Anxious  Depressed   Affect:    Appropriate   Thought Content:  Clear   Thought Form:  Coherent  Logical   Insight:    Good       Safety Issues and Plan for Safety and Risk Management:   [place] Suicide Severity Rating Scale (Lifetime/Recent)      3/22/2022    11:00 AM 5/31/2023    10:21 AM   [place] Suicide Severity Rating (Lifetime/Recent)   Q1 Wished to be Dead (Past Month) no    Q2 Suicidal Thoughts (Past Month) no    Q3 Suicidal Thought Method no    Q4 Suicidal Intent without Specific Plan no    Q5 Suicide Intent with Specific Plan no    Q6 Suicide Behavior (Lifetime) no    Level of Risk per Screen low risk    1. Wish to be Dead (Lifetime)  Y   Wish to be Dead Description (Lifetime)  \"I am not sure. I would have to think about it\"   1. Wish to be Dead (Past 1 Month)  Y   Wish to be Dead Description (Past 1 Month)  \"I think about killing myself everyday. It is hard to describe what that feels like other than just saying that. It only happens when I think about things I have said or things I have done that I regret when those thoughts come to my mind\".   2. Non-Specific Active Suicidal " "Thoughts (Lifetime)  Y   2. Non-Specific Active Suicidal Thoughts (Past 1 Month)  Y   Non-Specific Active Suicidal Thought Description (Past 1 Month)  \"I know I am not going to do. When I get those thoughts of killing myself, I think it is my way of saying to myself that I am very stressed out or overwhlemed right now.\"   3. Active Suicidal Ideation with any Methods (Not Plan) Without Intent to Act (Lifetime)  N   3. Active Suicidal Ideation with any Methods (Not Plan) Without Intent to Act (Past 1 Month)  N   4. Active Suicidal Ideation with Some Intent to Act, Without Specific Plan (Lifetime)  N   4. Active Suicidal Ideation with Some Intent to Act, Without Specific Plan (Past 1 Month)  N   5. Active Suicidal Ideation with Specific Plan and Intent (Lifetime)  N   5. Active Suicidal Ideation with Specific Plan and Intent (Past 1 Month)  N   Most Severe Ideation Rating (Lifetime)  2   Most Severe Ideation Rating (Past 1 Month)  2   Frequency (Lifetime)  4   Frequency (Past 1 Month)  4   Duration (Lifetime)  1   Duration (Past 1 Month)  1   Controllability (Lifetime)  1   Controllability (Past 1 Month)  1   Deterrents (Lifetime)  1   Deterrents (Past 1 Month)  1   Reasons for Ideation (Lifetime)  5   Reasons for Ideation (Past 1 Month)  5   Actual Attempt (Lifetime)  N   Has subject engaged in non-suicidal self-injurious behavior? (Lifetime)  N   Interrupted Attempts (Lifetime)  N   Aborted or Self-Interrupted Attempt (Lifetime)  N   Preparatory Acts or Behavior (Lifetime)  N   Calculated C-SSRS Risk Score (Lifetime/Recent)  Low Risk     Patient denies current fears or concerns for personal safety.  Patient reports the following current or recent suicidal ideation or behaviors: ongoing passive SI he has experienced the majority of his life and does not feel that he needs a safety plan to keep himself safe. Pt and provider discussed ways to lower access to means and safety concerns during session. Pt was responsive " and open to redirection on the topic.  Patient denies current or recent homicidal ideation or behaviors.  Patient denies current or recent self injurious behavior or ideation.  Patient denies other safety concerns.  Recommended that patient call 911 or go to the local ED should there be a change in any of these risk factors.  Patient reports there are firearms in the house. The firearms are not secured in a locked space. Client was advised to secure all firearms.     Diagnostic Criteria:  Generalized Anxiety Disorder  A. Excessive anxiety and worry about a number of events or activities (such as work or school performance).   B. The person finds it difficult to control the worry.  C. Select 3 or more symptoms (required for diagnosis). Only one item is required in children.   - Restlessness or feeling keyed up or on edge.    - Being easily fatigued.    - Difficulty concentrating or mind going blank.    - Irritability.    - Muscle tension.    - Sleep disturbance (difficulty falling or staying asleep, or restless unsatisfying sleep).   D. The focus of the anxiety and worry is not confined to features of an Axis I disorder.  E. The anxiety, worry, or physical symptoms cause clinically significant distress or impairment in social, occupational, or other important areas of functioning.   F. The disturbance is not due to the direct physiological effects of a substance (e.g., a drug of abuse, a medication) or a general medical condition (e.g., hyperthyroidism) and does not occur exclusively during a Mood Disorder, a Psychotic Disorder, or a Pervasive Developmental Disorder.    - The aformentioned symptoms began 2 year(s) ago and occurs 7 days per week and is experienced as severe.  Major Depressive Disorder  CRITERIA (A-C) REPRESENT A MAJOR DEPRESSIVE EPISODE - SELECT THESE CRITERIA  A) Recurrent episode(s) - symptoms have been present during the same 2-week period and represent a change from previous functioning 5 or more  symptoms (required for diagnosis)   - Depressed mood. Note: In children and adolescents, can be irritable mood.     - Diminished interest or pleasure in all, or almost all, activities.    - Decreased sleep.    - Psychomotor activity agitation.    - Fatigue or loss of energy.    - Feelings of worthlessness or inappropriate and excessive guilt.    - Diminished ability to think or concentrate, or indecisiveness.    - Recurrent thoughts of death (not just fear of dying), recurrent suicidal ideation without a specific plan, or a suicide attempt or a specific plan for committing suicide.   B) The symptoms cause clinically significant distress or impairment in social, occupational, or other important areas of functioning  C) The episode is not attributable to the physiological effects of a substance or to another medical condition  D) The occurence of major depressive episode is not better explained by other thought / psychotic disorders  E) There has never been a manic episode or hypomanic episode      DSM5 Diagnoses: (Sustained by DSM5 Criteria Listed Above)  Diagnoses: 296.33 (F33.2) Major Depressive Disorder, Recurrent Episode, Severe _  300.02 (F41.1) Generalized Anxiety Disorder  Psychosocial & Contextual Factors: loss of significant relationships, stress within work life, stress within social life  PROMIS-10    In general, would you say your health is:: 3    In general, would you say your quality of life is:: 3    In general, how would you rate your physical health?: 3    In general, how would you rate your mental health, including your mood and your ability to think?: 1    In general, how would you rate your satisfaction with your social activities and relationships?: 2    In general, please rate how well you carry out your usual social activities and roles. (This includes activities at home, at work and in your community, and responsibilities as a parent, child, spouse, employee, friend, etc.): 2    To what extent  are you able to carry out your everyday physical activities such as walking, climbing stairs, carrying groceries, or moving a chair?: 4    In the past 7 days, how often have you been bothered by emotional problems such as feeling anxious, depressed, or irritable?: 5  In the past 7 days, how would you rate your fatigue on average?: 5  In the past 7 days, how would you rate your pain on average, where 0 means no pain, and 10 means worst imaginable pain?: 3    PROMIS GLOBAL SCORES    Mental health question re-calculation - no clinical value - Mental health question re-calculation - no clinical value: 1  Physical health question re-calculation - no clinical value - Physical health question re-calculation - no clinical value: 1  Pain question re-calculation - no clinical value - Pain question re-calculation - no clinical value: 4  Global Mental Health Score - Global Mental Health Score: 7  Global Physical Health Score - Global Physical Health Score: 12  PROMIS TOTAL - SUBSCORES - PROMIS TOTAL - SUBSCORES: 19      [id number] PROMIS HEALTH ORGANIZATION AND PROMIS COOPERATIVE GROUP VERSION 1.1      Intervention:   Psychodynamic- Patient processed internal experiences , Completed through review of safety issues and safety interventions and Educated on treatment planning and started identifying goals and interventions for treatment plan  Collateral Reports Completed:  Not Applicable      PLAN: (Homework, other):  1. Provider will continue Diagnostic Assessment.  Patient was given the following to do until next session:  Think about two general goals he would like to start working toward with his time in therapy    2. Provider recommended the following referrals: none at the time of the assessment.      3.  Suicide Risk and Safety Concerns were assessed for Godfrey Porras.    Patient meets the following risk assessment and triage: Pt endorsed passive SI most of his life and screened as Low Risk on the assessment. Pt  declined making a safety plan at the time of the assessment and will continue to be monitored for any changes in the future      Jeannette Gilliam, Psychotherapist Trainee, [facility]  May 31, 2023    This note has been reviewed and I agree with the plan of care. This note is co-signed by Valentine King MA, [facility] Supervisor, on: 5/31/2023

## 2023-06-02 NOTE — TELEPHONE ENCOUNTER
MEDICAL RECORDS REQUEST   [facility]  Urology Clinic  [address] [place] [address]  PHONE: [phone]  Fax: [phone]        FUTURE VISIT INFORMATION                                                   Taiwo Fabian Pratt, : 1966 scheduled for future visit at [facility]    APPOINTMENT INFORMATION:    Date: 2023    Provider:  Ravi Kenyon MD    Reason for Visit/Diagnosis: buried penis/foreskin doesn't retract    REFERRAL INFORMATION:    Referring provider:  Tomás Blanco PA-C in [facility]    RECORDS REQUESTED FOR VISIT                                                     NOTES  STATUS/DETAILS   OFFICE NOTE from referring provider  yes, 2023 -- Tomás Blanco PA-C in [facility]   MEDICATION LIST  yes   LABS     URINALYSIS (UA)  yes     PRE-VISIT CHECKLIST      Joint diagnostic appointment coordinated correctly          (ensure right order & amount of time) Yes   RECORD COLLECTION COMPLETE Yes

## 2023-06-07 ENCOUNTER — PRE VISIT (OUTPATIENT)
Dept: UROLOGY | Facility: CLINIC | Age: 33
End: 2023-06-07

## 2023-06-07 ENCOUNTER — ALLIED HEALTH/NURSE VISIT (OUTPATIENT)
Dept: UROLOGY | Facility: CLINIC | Age: 33
End: 2023-06-07
Payer: COMMERCIAL

## 2023-06-07 ENCOUNTER — OFFICE VISIT (OUTPATIENT)
Dept: UROLOGY | Facility: CLINIC | Age: 33
End: 2023-06-07
Payer: COMMERCIAL

## 2023-06-07 ENCOUNTER — VIRTUAL VISIT (OUTPATIENT)
Dept: PSYCHOLOGY | Facility: CLINIC | Age: 33
End: 2023-06-07
Payer: COMMERCIAL

## 2023-06-07 VITALS
HEIGHT: 72 IN | WEIGHT: 197 LBS | DIASTOLIC BLOOD PRESSURE: 77 MMHG | BODY MASS INDEX: 26.68 KG/M2 | HEART RATE: 59 BPM | SYSTOLIC BLOOD PRESSURE: 111 MMHG

## 2023-06-07 DIAGNOSIS — F33.1 MAJOR DEPRESSIVE DISORDER, RECURRENT EPISODE, MODERATE WITH ANXIOUS DISTRESS (H): Primary | ICD-10-CM

## 2023-06-07 DIAGNOSIS — N47.1 PHIMOSIS: Primary | ICD-10-CM

## 2023-06-07 DIAGNOSIS — N47.8 FORESKIN DOES NOT RETRACT: ICD-10-CM

## 2023-06-07 DIAGNOSIS — N47.1 PHIMOSIS: ICD-10-CM

## 2023-06-07 PROCEDURE — 90791 PSYCH DIAGNOSTIC EVALUATION: CPT | Mod: VID

## 2023-06-07 PROCEDURE — 99207 PR NO CHARGE NURSE ONLY: CPT

## 2023-06-07 PROCEDURE — 99203 OFFICE O/P NEW LOW 30 MIN: CPT | Performed by: UROLOGY

## 2023-06-07 RX ORDER — CLINDAMYCIN PHOSPHATE 900 MG/50ML
900 INJECTION, SOLUTION INTRAVENOUS SEE ADMIN INSTRUCTIONS
Status: CANCELLED | OUTPATIENT
Start: 2023-06-07

## 2023-06-07 RX ORDER — CLINDAMYCIN PHOSPHATE 900 MG/50ML
900 INJECTION, SOLUTION INTRAVENOUS
Status: CANCELLED | OUTPATIENT
Start: 2023-06-07

## 2023-06-07 RX ORDER — CLOBETASOL PROPIONATE 0.5 MG/G
CREAM TOPICAL 2 TIMES DAILY
Qty: 45 G | Refills: 1 | Status: SHIPPED | OUTPATIENT
Start: 2023-06-07 | End: 2024-07-26

## 2023-06-07 ASSESSMENT — PATIENT HEALTH QUESTIONNAIRE - PHQ9
SUM OF ALL RESPONSES TO PHQ QUESTIONS 1-9: 19
SUM OF ALL RESPONSES TO PHQ QUESTIONS 1-9: 19
10. IF YOU CHECKED OFF ANY PROBLEMS, HOW DIFFICULT HAVE THESE PROBLEMS MADE IT FOR YOU TO DO YOUR WORK, TAKE CARE OF THINGS AT HOME, OR GET ALONG WITH OTHER PEOPLE: VERY DIFFICULT

## 2023-06-07 ASSESSMENT — PAIN SCALES - GENERAL: PAINLEVEL: NO PAIN (0)

## 2023-06-07 NOTE — PROGRESS NOTES
Godfrey Porras is a 32 year old male.  He works as a DJ  He's had phimosis  He can only partially reduce foreskin then it becomes painful. The foreskin is tight.  He has glans sensitivity  This affects sexual function.      Vital signs reviewed    Exam:  Phimosis present.  Glans cannot be exposed by reducing foreskin. Then it becomes painful.  There is no balanitis or masses.  No signs of LS  Testicles WNL.  Scrotum WNL    A/P   Severe phimosis.  He wants to try some creams but given severity I think low likelihood of success  Thus he wants to schedule circumcision because he is 32 years old and he's had decades of phimosis.  Risks, benefits, alternatives discussed.  Schedule circumcision

## 2023-06-07 NOTE — NURSING NOTE
Chief Complaint   Patient presents with     Consult     Problems with my foreskin       Blood pressure 111/77, pulse 59, height 1.829 m (6'), weight 89.4 kg (197 lb). Body mass index is 26.72 kg/m .    Patient Active Problem List   Diagnosis     ADD (attention deficit disorder) without hyperactivity     Marijuana use     Major depressive disorder, recurrent episode, moderate (H)     INDY (generalized anxiety disorder)     Mild persistent asthma without complication       Allergies   Allergen Reactions     Amoxicillin Rash     Penicillins Rash       Current Outpatient Medications   Medication Sig Dispense Refill     albuterol (PROAIR HFA/PROVENTIL HFA/VENTOLIN HFA) 108 (90 Base) MCG/ACT inhaler Inhale 2 puffs into the lungs every 4 hours as needed for wheezing for asthma symptoms. 18 g 5     fluticasone-salmeterol (ADVAIR HFA) 115-21 MCG/ACT inhaler Inhale 2 puffs into the lungs 2 times daily for asthma prevention. 36 g 11     lisdexamfetamine (VYVANSE) 20 MG capsule Take 1 capsule (20 mg) by mouth every morning 30 capsule 0     fluticasone-salmeterol (ADVAIR) 100-50 MCG/ACT inhaler Inhale 1 puff into the lungs every 12 hours for 30 days 1 each 0     montelukast (SINGULAIR) 10 MG tablet Take 1 tablet (10 mg) by mouth At Bedtime for 30 days 30 tablet 0       Social History     Tobacco Use     Smoking status: Former     Packs/day: 1.00     Years: 5.00     Pack years: 5.00     Types: Cigarettes     Quit date: 2012     Years since quittin.1     Smokeless tobacco: Never     Tobacco comments:     2.5 years   Vaping Use     Vaping status: Never Used   Substance Use Topics     Alcohol use: Yes     Alcohol/week: 4.0 - 6.0 standard drinks of alcohol     Types: 4 - 6 Standard drinks or equivalent per week     Comment: Fri and Sat     Drug use: Yes     Frequency: 3.0 times per week     Types: Marijuana     Comment: 2-3       Teri Tyson  2023  8:56 AM

## 2023-06-07 NOTE — PROGRESS NOTES
"[facility]      PATIENT'S NAME: Godfrey Porras  PREFERRED NAME: Godfrey  PRONOUNS:     he/him  MRN: [id number]  : 1990  ADDRESS: [address] [place] [address] [address]  [place] [address]  [facility]  [id number]  DATE OF SERVICE: 23  START TIME: 1:30pm  END TIME: 2:15pm  PREFERRED PHONE: [phone]  May we leave a program related message: Yes  SERVICE MODALITY:  Video Visit:      Provider verified identity through the following two step process.  Patient provided:  Patient is known previously to provider    Telemedicine Visit: The patient's condition can be safely assessed and treated via synchronous audio and visual telemedicine encounter.      Reason for Telemedicine Visit: Patient has requested telehealth visit and Patient convenience (e.g. access to timely appointments / distance to available provider)    Originating Site (Patient Location): Patient's home    Distant Site (Provider Location): Provider Remote Setting- Home Office    Consent:  The patient/guardian has verbally consented to: the potential risks and benefits of telemedicine (video visit) versus in person care; bill my insurance or make self-payment for services provided; and responsibility for payment of non-covered services.     Patient would like the video invitation sent by:  My Chart    Mode of Communication:  Video Conference via Amwell    Distant Location (Provider):  On-site    As the provider I attest to compliance with applicable laws and regulations related to telemedicine.    UNIVERSAL ADULT Mental Health DIAGNOSTIC ASSESSMENT    Identifying Information:  Patient is a 32 year old,   individual.  Patient was referred for an assessment by primary care clinic.  Patient attended the session alone.    Chief Complaint:   The reason for seeking services at this time is: \" increased depression and no motivation to get things done. \"   The problem(s) began first began when patient was in his teens and struggling with " "depression for the first time. Pt reports symptoms have come and gone throughout his life with his most recent episode starting about one year ago and has continued to worsen . Patient has not attempted to resolve these concerns in the past.    Social/Family History:  Patient reported they grew up in [place], [place].  They were raised by biological parents and step father.  Parents  about 20 years ago when the patient was 12 years old. The patient mother remarried when pt was 16 and got  about 6 months later and remains single today The patient's father did remarry about 3 years ago and remains  today.   Patient reported that their childhood was \"hard\" due to experiencing bullying at school for his weight beginning around age 12. Pt denies any abuse within his family and reports the relationship with his parents growing was always \"interesting\" due to them being  and spending more time with his mom growing up.  Patient described their current relationships with family of origin as \"okay\" reporting he does not like to spend a lot of time with them due to stressful communication styles.      The patient describes their cultural background as \"valuing solitude since I am by myself 99% of the time, valuing education and learning\".  Cultural influences and impact on patient's life structure, values, norms, and healthcare: Social Orientation: growing up in a split family and the norms he learned from his family and sister early on in life.  Contextual influences on patient's health include: Contextual Factors: Individual Factors related to his perception of the world and world views, and Family Factors related to the interactions he has with his family and the DNA he got from his parents.  Cultural, Contextual, and socioeconomic factors do not affect the patient's access to services.  These factors will be addressed in the Preliminary Treatment plan.  Patient identified their preferred " "language to be English. Patient reported they do not  need the assistance of an  or other support involved in therapy.     Patient reported had no significant delays in developmental tasks.   Patient's highest education level was college graduate. Patient identified the following learning problems: attention and concentration.  Modifications will not be used to assist communication in therapy.  Patient reports they are  able to understand written materials.    Patient reported the following relationship history as having 4 significant, serious relationships in his life.  Patient's current relationship status is \"complicated\" due to breaking up with a significant relationship in December 2022 that was not final until Feb of 2023, and now talking to a different person and they see each other regularly and are not seeing anyone else for the last few months.   Patient identified their sexual orientation as heterosexual.  Patient reported having zero child(wilfredo). Patient identified parents, siblings and friends as part of their support system.  Patient identified the quality of these relationships as fair.     Patient's current living/housing situation involves staying in own home/apartment.  They live with nobody and they report that housing is stable.     Patient is currently employed part time and reports they are able to function appropriately at work..  Patient reports their finances are obtained through employment.  Patient does identify finances as a current stressor.      Patient reported that they have been involved with the legal system.  Pt reports being arrested for a DWI in 2019 which he was court mandated to a one-day treatment class. Patient denies being on probation / parole / under the jurisdiction of the court.      Patient's Strengths and Limitations:  Patient identified the following strengths or resources that will help them succeed in treatment: insight, intelligence, positive work " environment, sense of humor and strong social skills. Things that may interfere with the patient's success in treatment include: few friends, financial hardship and lack of family support.     Assessments:  The following assessments were completed by patient for this visit:  PHQ9:       6/13/2022     7:01 AM 8/2/2022     1:00 PM 9/8/2022     3:18 PM 1/19/2023     1:14 PM 4/21/2023     4:32 PM 5/31/2023     9:58 AM 6/7/2023     1:17 PM   PHQ-9 SCORE   PHQ-9 Total Score MyChart 15 (Moderately severe depression)  10 (Moderate depression) 20 (Severe depression)  22 (Severe depression) 19 (Moderately severe depression)   PHQ-9 Total Score 15 12 10 20 5 22 19     GAD7:       3/22/2022     1:45 AM 4/27/2022    12:47 PM 6/13/2022     7:02 AM 8/2/2022     1:00 PM 9/8/2022     3:18 PM 1/19/2023     1:15 PM 5/31/2023     9:59 AM   INDY-7 SCORE   Total Score 14 (moderate anxiety) 12 (moderate anxiety) 14 (moderate anxiety)  6 (mild anxiety) 8 (mild anxiety) 14 (moderate anxiety)   Total Score 14    14 12 14 10 6 8 14     CAGE-AID:       3/22/2022     1:48 AM   CAGE-AID Total Score   Total Score 0   Total Score MyChart 0 (A total score of 2 or greater is considered clinically significant)     PROMIS 10-Global Health (all questions and answers displayed):       3/29/2022     9:02 AM 8/8/2022     9:50 AM 5/31/2023    10:16 AM 6/7/2023     1:18 PM   PROMIS 10   In general, would you say your health is: Good Fair  Fair   In general, would you say your quality of life is: Good Good  Good   In general, how would you rate your physical health? Fair Fair  Good   In general, how would you rate your mental health, including your mood and your ability to think? Fair Fair  Poor   In general, how would you rate your satisfaction with your social activities and relationships? Fair Poor  Poor   In general, please rate how well you carry out your usual social activities and roles Poor Good  Poor   To what extent are you able to carry out your  everyday physical activities such as walking, climbing stairs, carrying groceries, or moving a chair? Completely Completely  Mostly   In the past 7 days, how often have you been bothered by emotional problems such as feeling anxious, depressed, or irritable? Often Sometimes  Often   In the past 7 days, how would you rate your fatigue on average? Moderate Moderate  Severe   In the past 7 days, how would you rate your pain on average, where 0 means no pain, and 10 means worst imaginable pain? 2 3  3   In general, would you say your health is: 3 2 3 2   In general, would you say your quality of life is: 3 3 3 3   In general, how would you rate your physical health? 2 2 3 3   In general, how would you rate your mental health, including your mood and your ability to think? 2 2 1 1   In general, how would you rate your satisfaction with your social activities and relationships? 2 1 2 1   In general, please rate how well you carry out your usual social activities and roles. (This includes activities at home, at work and in your community, and responsibilities as a parent, child, spouse, employee, friend, etc.) 1 3 2 1   To what extent are you able to carry out your everyday physical activities such as walking, climbing stairs, carrying groceries, or moving a chair? 5 5 4 4   In the past 7 days, how often have you been bothered by emotional problems such as feeling anxious, depressed, or irritable? 4 3 5 4   In the past 7 days, how would you rate your fatigue on average? 3 3 5 4   In the past 7 days, how would you rate your pain on average, where 0 means no pain, and 10 means worst imaginable pain? 2 3 3 3   Global Mental Health Score 9 9 7 7   Global Physical Health Score 14 14 12 13   PROMIS TOTAL - SUBSCORES 23 23 19 20     [place] Suicide Severity Rating Scale (Lifetime/Recent)      3/22/2022    11:00 AM 5/31/2023    10:21 AM   [place] Suicide Severity Rating (Lifetime/Recent)   Q1 Wished to be Dead (Past Month) no   "  Q2 Suicidal Thoughts (Past Month) no    Q3 Suicidal Thought Method no    Q4 Suicidal Intent without Specific Plan no    Q5 Suicide Intent with Specific Plan no    Q6 Suicide Behavior (Lifetime) no    Level of Risk per Screen low risk    1. Wish to be Dead (Lifetime)  Y   Wish to be Dead Description (Lifetime)  \"I am not sure. I would have to think about it\"   1. Wish to be Dead (Past 1 Month)  Y   Wish to be Dead Description (Past 1 Month)  \"I think about killing myself everyday. It is hard to describe what that feels like other than just saying that. It only happens when I think about things I have said or things I have done that I regret when those thoughts come to my mind\".   2. Non-Specific Active Suicidal Thoughts (Lifetime)  Y   2. Non-Specific Active Suicidal Thoughts (Past 1 Month)  Y   Non-Specific Active Suicidal Thought Description (Past 1 Month)  \"I know I am not going to do. When I get those thoughts of killing myself, I think it is my way of saying to myself that I am very stressed out or overwhlemed right now.\"   3. Active Suicidal Ideation with any Methods (Not Plan) Without Intent to Act (Lifetime)  N   3. Active Suicidal Ideation with any Methods (Not Plan) Without Intent to Act (Past 1 Month)  N   4. Active Suicidal Ideation with Some Intent to Act, Without Specific Plan (Lifetime)  N   4. Active Suicidal Ideation with Some Intent to Act, Without Specific Plan (Past 1 Month)  N   5. Active Suicidal Ideation with Specific Plan and Intent (Lifetime)  N   5. Active Suicidal Ideation with Specific Plan and Intent (Past 1 Month)  N   Most Severe Ideation Rating (Lifetime)  2   Most Severe Ideation Rating (Past 1 Month)  2   Frequency (Lifetime)  4   Frequency (Past 1 Month)  4   Duration (Lifetime)  1   Duration (Past 1 Month)  1   Controllability (Lifetime)  1   Controllability (Past 1 Month)  1   Deterrents (Lifetime)  1   Deterrents (Past 1 Month)  1   Reasons for Ideation (Lifetime)  5   Reasons " for Ideation (Past 1 Month)  5   Actual Attempt (Lifetime)  N   Has subject engaged in non-suicidal self-injurious behavior? (Lifetime)  N   Interrupted Attempts (Lifetime)  N   Aborted or Self-Interrupted Attempt (Lifetime)  N   Preparatory Acts or Behavior (Lifetime)  N   Calculated C-SSRS Risk Score (Lifetime/Recent)  Low Risk       Personal and Family Medical History:  Patient does report a family history of mental health concerns.  Patient reports family history includes Asthma in his father; Breast Cancer in his maternal grandmother; Depression in his maternal aunt, maternal grandmother, and mother; Myocardial Infarction (age of onset: 61) in his paternal grandfather..     Patient does report Mental Health Diagnosis and/or Treatment.  Patient Patient reported the following previous diagnoses which include(s): ADHD, an Anxiety Disorder and Depression.  Patient reported symptoms began when he was a teenager.   Patient has received mental health services in the past: therapy with an outside organization, inpatient mental health services at [facility] and MI / CD day treatment at [facility].  Psychiatric Hospitalizations: [facility].  Patient denies a history of civil commitment.  Patient is not receiving other mental health services.  These include none.         Patient has had a physical exam to rule out medical causes for current symptoms.  Date of last physical exam was within the past year. Client was encouraged to follow up with PCP if symptoms were to develop. The patient does not have a PCP and is encouraged to get a Primary Care Provider.  Patient reports no current medical concerns and no current dental concerns.  Patient denies any issues with pain..   There are not significant appetite / nutritional concerns / weight changes. These may include: no concerns. Patient reports the following sleep concerns:  Delayed sleep onset and Early  awakening.   Patient does report a history of head injury / trauma / cognitive impairment.  Pt denies any lasting effects from two previous head injuries.    See EPIC for medication information    Medication Adherence:  Patient reports taking prescribed medications as prescribed.    Patient Allergies:    Allergies   Allergen Reactions     Amoxicillin Rash     Penicillins Rash       Medical History:    Past Medical History:   Diagnosis Date     ADD (attention deficit disorder) without hyperactivity     has been on concerta and adderall     INDY (generalized anxiety disorder)      Major depression     since about age 12-13.  Suicide attempt [place]     Mild persistent asthma     triggers-          Current Mental Status Exam:   Appearance:  Appropriate    Eye Contact:  Good   Psychomotor:  Normal       Gait / station:  no problem  Attitude / Demeanor: Cooperative  Interested Pleasant  Speech      Rate / Production: Normal/ Responsive      Volume:  Normal  volume      Language:  intact, no problems and good  Mood:   Normal  Affect:   Blunted    Thought Content: Clear   Thought Process: Coherent  Logical       Associations: No loosening of associations  Insight:   Good   Judgment:  Intact   Orientation:  All  Attention/concentration: Fair      Substance Use:  Patient did report a family history of substance use concerns; see medical history section for details.  Patient has received chemical dependency treatment in the past at [facility] adolescent treatment program.  Patient has not ever been to detox.      Patient is not currently receiving any chemical dependency treatment. Patient reported the following problems as a result of their substance use:  DUI.    Patient reports using alcohol 4 times per week and has 4 beers at a time. Patient first started drinking at age 16.  Patient reported date of last use was 6/6/23.  Patient reports heaviest use was in his 20s.  Patient denies using tobacco.  Patient reports  "using cannabis 3 times per day and smokes a couple hits at a time. Patient started using cannabis at age 16.  Patient reports last use was 6/6/23.  Patient reports heaviest use is current use.  Patient reports using caffeine 1 times per day and drinks 1 at a time. Patient started using caffeine at age teenager.  Patient reports using/abusing the following substance(s). Patient reported no other substance use.     Substance Use: Pt reports only symptoms of his substance use is the occasional hangover when he \"over does it\" which occurs less than once per month    Based on the  CAGE score and clinical interview there  maybe indications of substance use concerns.      Significant Losses / Trauma / Abuse / Neglect Issues:   Patient did not serve in the .  There are indications or report of significant loss, trauma, abuse or neglect issues related to: are indications or report of significant loss, trauma, abuse or neglect issues related to, death of 4 close friends spanning over the last 5 years and divorce / relational changes related to his parent's divorce when he was 12.  Concerns for possible neglect are not present.     Safety Assessment:   Patient denies current homicidal ideation and behaviors.  Patient reports current self-injurious ideation.  Onset: rapid, frequency: less than once per week, duration: a few minutes, intensity: easy to control.  Client reports they are not currently engaging in self-injurious behaivor..  Patient denied risk behaviors associated with substance use.  Patient denies any high risk behaviors associated with mental health symptoms.  Patient reports the following current concerns for their personal safety: None.  Patient reports there are firearms in the house.     no, they are not secured. The firearms are not secured in a locked space. Client was advised to secure all firearms.    History of Safety Concerns:  Patient denied a history of homicidal ideation.     Patient denied " a history of personal safety concerns.    Patient denied a history of assaultive behaviors.    Patient denied a history of sexual assault behaviors.     Patient denied a history of risk behaviors associated with substance use.  Patient denies any history of high risk behaviors associated with mental health symptoms.  Patient reports the following protective factors: forward or future oriented thinking; dedication to family or friends    Risk Plan:  See Recommendations for Safety and Risk Management Plan    Review of Symptoms per patient report:   Depression: Change in sleep, Lack of interest, Excessive or inappropriate guilt, Change in energy level, Difficulties concentrating, Psychomotor slowing or agitation, Suicidal ideation, Feelings of hopelessness, Feelings of helplessness, Low self-worth, Ruminations, Irritability, Feeling sad, down, or depressed and Withdrawn  [place]:  No Symptoms  Psychosis: No Symptoms  Anxiety: Excessive worry, Sleep disturbance, Psychomotor agitation and Poor concentration  Panic:  No symptoms  Post Traumatic Stress Disorder:  Experienced traumatic event related to the loss of his friends   Eating Disorder: No Symptoms  ADD / ADHD:  Inattentive, Difficulties listening, Poor task completion, Poor organizational skills, Distractibility, Forgetful and Restlessness/fidgety  Conduct Disorder: No symptoms  Autism Spectrum Disorder: No symptoms  Obsessive Compulsive Disorder: No Symptoms    Patient reports the following compulsive behaviors and treatment history: Gambling - has not had treatment..      Diagnostic Criteria:   Generalized Anxiety Disorder  A. Excessive anxiety and worry about a number of events or activities (such as work or school performance).   B. The person finds it difficult to control the worry.  C. Select 3 or more symptoms (required for diagnosis). Only one item is required in children.   - Restlessness or feeling keyed up or on edge.    - Being easily fatigued.    -  Difficulty concentrating or mind going blank.    - Irritability.    - Muscle tension.    - Sleep disturbance (difficulty falling or staying asleep, or restless unsatisfying sleep).   D. The focus of the anxiety and worry is not confined to features of an Axis I disorder.  E. The anxiety, worry, or physical symptoms cause clinically significant distress or impairment in social, occupational, or other important areas of functioning.   F. The disturbance is not due to the direct physiological effects of a substance (e.g., a drug of abuse, a medication) or a general medical condition (e.g., hyperthyroidism) and does not occur exclusively during a Mood Disorder, a Psychotic Disorder, or a Pervasive Developmental Disorder.    - The aformentioned symptoms began about 1 year(s) ago and occurs 5 days per week and is experienced as moderate. Major Depressive Disorder  CRITERIA (A-C) REPRESENT A MAJOR DEPRESSIVE EPISODE - SELECT THESE CRITERIA  A) Recurrent episode(s) - symptoms have been present during the same 2-week period and represent a change from previous functioning 5 or more symptoms (required for diagnosis)   - Depressed mood. Note: In children and adolescents, can be irritable mood.     - Diminished interest or pleasure in all, or almost all, activities.    - trouble falling asleep or staying asleep sleep.    - Psychomotor activity agitation.    - Fatigue or loss of energy.    - Feelings of worthlessness or inappropriate and excessive guilt.    - Diminished ability to think or concentrate, or indecisiveness.    - Recurrent thoughts of death (not just fear of dying), recurrent suicidal ideation without a specific plan, or a suicide attempt or a specific plan for committing suicide.   B) The symptoms cause clinically significant distress or impairment in social, occupational, or other important areas of functioning  C) The episode is not attributable to the physiological effects of a substance or to another medical  condition  D) The occurence of major depressive episode is not better explained by other thought / psychotic disorders  E) There has never been a manic episode or hypomanic episode    Functional Status:  Patient reports the following functional impairments:  health maintenance, management of the household and or completion of tasks, organization, relationship(s), self-care and work / vocational responsibilities.     Nonprogrammatic care:  Patient is requesting basic services to address current mental health concerns.    Clinical Summary:  1. Reason for assessment: increased depressive symptoms over the last year and interest in getting more motivation for tasks  .  2. Psychosocial, Cultural and Contextual Factors: change and loss of significant relationships, stress within finacies and important areas of life  .  3. Principal DSM5 Diagnoses  (Sustained by DSM5 Criteria Listed Above):   296.32 (F33.1) Major Depressive Disorder, Recurrent Episode, Moderate With anxious distress.  4. Other Diagnoses that is relevant to services:  None at the time of the assessment.  5. Provisional Diagnosis:  Attention-Deficit/Hyperactivity Disorder  314.00 (F90.0) Predominantly inattentive presentation  300.02 (F41.1) Generalized Anxiety Disorder as evidenced by psychological testing about one year ago for the ADHD and meeting symptoms criteria for INDY but unsure of what is ADHD/depression vs INDY. Pt will be continued to monitored for anxiety levels to see if he experiences new symptoms that are not better explained by ADHD or MDD.  6. Prognosis: Expect Improvement.  7. Likely consequences of symptoms if not treated: Pt may experience worsening depression and increase passive SI if not treated for his depression at this time  8. Client strengths include:  educated, empathetic, employed, goal-focused, intelligent, motivated, support of family, friends and providers and supportive .     Recommendations:     1. Plan for Safety and Risk  Management:   Safety and Risk: Recommended that patient call 911 or go to the local ED should there be a change in any of these risk factors..          Report to child / adult protection services was NA.     2. Patient's identified his depressive symptoms as the most pressing for him at this time.    3. Initial Treatment will focus on:    Depressed Mood - increased desire to engage in activities that have brought him ronald previously, increase motivation to complete tasks each day.     4. Resources/Service Plan:    services are not indicated.   Modifications to assist communication are not indicated.   Additional disability accommodations are not indicated.      5. Collaboration:   Collaboration / coordination of treatment will be initiated with the following  support professionals: none made at the time of the assessment.      6.  Referrals:   The following referral(s) will be initiated: none made at the time of the assessment. Next Scheduled Appointment: 6/13/23.      A Release of Information has been obtained for the following: none made at the time of the assessment.     Emergency Contact was obtained.      Clinical Substantiation/medical necessity for the above recommendations:  No referrals made at the time of the assessment.    7. MRACIE:    MARCIE:  Discussed the general effects of drugs and alcohol on health and well-being and discusssed effects of drugs and alcohol on mental health. Provider gave patient printed information about the  effects of chemical use on their health and well being. Recommendations:  Pt is to not increase substance use and is to discuss any changes in use with provider .     8. Records:   These were reviewed at time of assessment.   Information in this assessment was obtained from the medical record and  provided by patient who is a good historian.    Patient will have open access to their mental health medical record.    9.   Interactive Complexity: No      Provider Name/  Credentials:  Jeannette Gilliam, Psychotherapist Trainee, [facility] [place]  June 7, 2023  This note has been reviewed and I agree with the plan of care. This note is co-signed by Valentine King MA, [facility] Supervisor, on:6/8/2023

## 2023-06-07 NOTE — LETTER
6/7/2023       RE: Godfrey Porras  [address]  [place] [address]     Dear Colleague,    Thank you for referring your patient, Godfrey Porras, to the [facility] [place] at [facility]. Please see a copy of my visit note below.    Godfrey Porras is a 32 year old male.  He works as a DJ  He's had phimosis  He can only partially reduce foreskin then it becomes painful. The foreskin is tight.  He has glans sensitivity  This affects sexual function.      Vital signs reviewed    Exam:  Phimosis present.  Glans cannot be exposed by reducing foreskin. Then it becomes painful.  There is no balanitis or masses.  No signs of LS  Testicles WNL.  Scrotum WNL    A/P   Severe phimosis.  He wants to try some creams but given severity I think low likelihood of success  Thus he wants to schedule circumcision because he is 32 years old and he's had decades of phimosis.  Risks, benefits, alternatives discussed.  Schedule circumcision    Sincerely,  Ravi Kenyon MD

## 2023-06-13 ENCOUNTER — OFFICE VISIT (OUTPATIENT)
Dept: PSYCHOLOGY | Facility: CLINIC | Age: 33
End: 2023-06-13
Payer: COMMERCIAL

## 2023-06-13 DIAGNOSIS — F33.1 MAJOR DEPRESSIVE DISORDER, RECURRENT EPISODE, MODERATE WITH ANXIOUS DISTRESS (H): Primary | ICD-10-CM

## 2023-06-13 PROCEDURE — 90834 PSYTX W PT 45 MINUTES: CPT

## 2023-06-13 NOTE — PROGRESS NOTES
"[facility]                                     Progress Note    Patient Name: Godfrey Porras  Date: 6/13/23         Service Type: Individual      Session Start Time: 11:11am  Session End Time: 12:00pm      Session Length: 49 minutes    Session #: 3    Attendees: Client attended alone    Service Modality:  In-person    DATA  Interactive Complexity: No  Crisis: No        Progress Since Last Session (Related to Symptoms / Goals / Homework):   Symptoms: No change in depressive symtpoms    Homework: Achieved / completed to satisfaction      Episode of Care Goals: Satisfactory progress - ACTION (Actively working towards change); Intervened by reinforcing change plan / affirming steps taken     Current / Ongoing Stressors and Concerns:   Pt reports ongoing stressors as work and thinking about past relationship with his ex-girlfriend. Pt reports mood since last session has been\"depressed with anxiety\" over the last week. Pt reports ongoing stress in romantic situations he has been experiencing over the last several months and ongoing passive SI. Pt and provider discussed his goals for therapy, his triggers for his passive SI, and processed some of the feelings he has had about the relationship with his ex girlfriend as well as the feelings he has around the view of himself. Pt appears to have gained insight into his need to develop a stronger sense of self and work toward a more stable understanding of the relationship he shared with his ex. Pt appears to be working toward gaining insight into his emotional triggers as well as what feelings lead him to experience passive SI. Pt denies any active SI, SIB, or HI since last session. Pt denied needing a safety plan this session and was able to contract for safety. Pt was reminded of resources in the area if he would need them and feels comfortable reaching out to emergency services should he need support in a crisis. See below for plan of care until " next appointment.     Treatment Objective(s) Addressed in This Session:   identify at least 5 triggers for anxiety  Increase interest, engagement, and pleasure in doing things  Decrease frequency and intensity of feeling down, depressed, hopeless  Feel less tired and more energy during the day   Identify negative self-talk and behaviors: challenge core beliefs, myths, and actions  Improve concentration, focus, and mindfulness in daily activities   Decrease thoughts that you'd be better off dead or of suicide / self-harm  increase understanding of steps in the grief process  Treatment plan created this session     Intervention:   CBT: thinking pattern discussion, core belief about self discussion, trigger for thinking pattern identification  Motivational Interviewing: evoking change talk and creating treatment plan, increasing internal motivation for change    Assessments completed prior to visit:  The following assessments were completed by patient for this visit:  PROMIS 10-Global Health (all questions and answers displayed):       3/29/2022     9:02 AM 8/8/2022     9:50 AM 5/31/2023    10:16 AM 6/7/2023     1:18 PM   PROMIS 10   In general, would you say your health is: Good Fair  Fair   In general, would you say your quality of life is: Good Good  Good   In general, how would you rate your physical health? Fair Fair  Good   In general, how would you rate your mental health, including your mood and your ability to think? Fair Fair  Poor   In general, how would you rate your satisfaction with your social activities and relationships? Fair Poor  Poor   In general, please rate how well you carry out your usual social activities and roles Poor Good  Poor   To what extent are you able to carry out your everyday physical activities such as walking, climbing stairs, carrying groceries, or moving a chair? Completely Completely  Mostly   In the past 7 days, how often have you been bothered by emotional problems such as  feeling anxious, depressed, or irritable? Often Sometimes  Often   In the past 7 days, how would you rate your fatigue on average? Moderate Moderate  Severe   In the past 7 days, how would you rate your pain on average, where 0 means no pain, and 10 means worst imaginable pain? 2 3  3   In general, would you say your health is: 3 2 3 2   In general, would you say your quality of life is: 3 3 3 3   In general, how would you rate your physical health? 2 2 3 3   In general, how would you rate your mental health, including your mood and your ability to think? 2 2 1 1   In general, how would you rate your satisfaction with your social activities and relationships? 2 1 2 1   In general, please rate how well you carry out your usual social activities and roles. (This includes activities at home, at work and in your community, and responsibilities as a parent, child, spouse, employee, friend, etc.) 1 3 2 1   To what extent are you able to carry out your everyday physical activities such as walking, climbing stairs, carrying groceries, or moving a chair? 5 5 4 4   In the past 7 days, how often have you been bothered by emotional problems such as feeling anxious, depressed, or irritable? 4 3 5 4   In the past 7 days, how would you rate your fatigue on average? 3 3 5 4   In the past 7 days, how would you rate your pain on average, where 0 means no pain, and 10 means worst imaginable pain? 2 3 3 3   Global Mental Health Score 9 9 7 7   Global Physical Health Score 14 14 12 13   PROMIS TOTAL - SUBSCORES 23 23 19 20         ASSESSMENT: Current Emotional / Mental Status (status of significant symptoms):   Risk status (Self / Other harm or suicidal ideation)   Patient denies current fears or concerns for personal safety.   Patient reports the following current or recent suicidal ideation or behaviors: pt endorses passive SI and denies having any intention or plan of harming himself. Pt was able to verbalize resources he could reach  out to should he need them. Pt was able to identify triggers for his passive SI and ways he is able to cope through them. Pt contracts for safety.   Patient denies current or recent homicidal ideation or behaviors.   Patient denies current or recent self injurious behavior or ideation.   Patient denies other safety concerns.   Patient reports there has been no change in risk factors since their last session.     Patient reports there has been no change in protective factors since their last session.     Recommended that patient call 911 or go to the local ED should there be a change in any of these risk factors.     Appearance:   Appropriate    Eye Contact:   Good    Psychomotor Behavior: Normal    Attitude:   Cooperative    Orientation:   All   Speech    Rate / Production: Normal     Volume:  Normal    Mood:    Depressed  Sad    Affect:    Appropriate  Tearful   Thought Content:  Clear    Thought Form:  Coherent  Logical    Insight:    Good      Medication Review:   No changes to current psychiatric medication(s)     Medication Compliance:   Yes     Changes in Health Issues:   None reported     Chemical Use Review:   Substance Use: Chemical use reviewed, no active concerns identified . Pt feels in control of his substance use and does not feel that it is an issue to discuss at this time.   Tobacco Use: No current tobacco use.      Diagnosis:  Major Depressive Disorder, Recurrent Episode, Moderate with anxious distress    Collateral Reports Completed:   Not Applicable    PLAN: (Patient Tasks / Therapist Tasks / Other)  1. Pt is to identify any emotions that come up for him when he feels his passive SI triggered.   2. Pt is to identify any core beliefs about himself that come up when he feels his passive SI triggered.   3. Pt is to practice as least one act of self care before next appointment.        Jeannette Gilliam, Psychotherapist Trainee, [facility]  6/13/23                                                             This note has been reviewed and I agree with the plan of care. This note is co-signed by Valentine King MA, [facility] Supervisor, on: 6/13/2023.                                                         ______________________________________________________________________    Individual Treatment Plan    Patient's Name: Godfrey Porras  YOB: 1990    Date of Creation: 6/13/23  Date Treatment Plan Last Reviewed/Revised: 6/13/23    DSM5 Diagnoses: 296.32 (F33.1) Major Depressive Disorder, Recurrent Episode, Moderate With anxious distress  Psychosocial / Contextual Factors: loss of important relationship, stress at work and stress within important roles, death of close friends  PROMIS (reviewed every 90 days): 20    Referral / Collaboration:  Referral to another professional/service is not indicated at this time..    Anticipated number of session for this episode of care: 9-12 sessions  Anticipation frequency of session: Weekly  Anticipated Duration of each session: 38-52 minutes  Treatment plan will be reviewed in 90 days or when goals have been changed.       MeasurableTreatment Goal(s) related to diagnosis / functional impairment(s)  Goal 1: Patient will decrease his depressive symptoms by 50%.    I will know I've met my goal when I can do things I enjoy doing again.      Objective #A    Patient will Decrease frequency and intensity of feeling down, depressed, hopeless.  Status: New - Date: 6/13/23     Intervention(s)  Therapist will teach emotion regulation skills and coping skills for his depressive symptoms. Pt will be assigned homework related to practicing skills outside of therapy.    Objective #B  Patient will Identify negative self-talk and behaviors: challenge core beliefs, myths, and actions.   Status: New - Date: 6/13/23     Intervention(s)  Therapist will teach about thinking patterns and core beliefs/automatic thoughts. Pt will be assigned homework related to recognizing emotions and  thinking patterns within himself to discuss during session.    Objective #C  Patient will Decrease thoughts that you'd be better off dead or of suicide / self-harm.   Status: New - Date: 6/13/23     Intervention(s)  Therapist will teach emotion regulation skills, distress tolerance skills, and thought modification to replace his maladaptive automatic thoughts. Pt will be assigned homework of thought logs and practicing skills outside of therapy.    Objective #D   Patient will use cognitive strategies identified in therapy to challenge anxious thoughts    Status: New - Date: 6/13/23     Intervention(s)  Therapist will assign homework related to thought challenging outside of the therapy room using emotion regulation skills learned.    Objective #E  Patient will identify 5 values that are important to him and the way he lives his life. Pt will identify ways he can live with those values in mind.    Status: New - Date: 6/13/23     Intervention(s)  Therapist will introduce ACT and value identification to pt. Pt will be assigned homework related to behavioral activation.    Objective #F  Patient will talk to at least two others about losses and coping.   Status: New - Date: 6/13/23     Intervention(s)  Therapist will assign homework related to pt reaching out and building stronger social support in his life..      Patient has reviewed and agreed to the above plan.      Jeannette Gilliam, Psychotherapist Trainee, [facility]    June 13, 2023

## 2023-06-14 ENCOUNTER — HOSPITAL ENCOUNTER (OUTPATIENT)
Facility: AMBULATORY SURGERY CENTER | Age: 33
End: 2023-06-14
Attending: UROLOGY
Payer: COMMERCIAL

## 2023-06-14 ENCOUNTER — TELEPHONE (OUTPATIENT)
Dept: UROLOGY | Facility: CLINIC | Age: 33
End: 2023-06-14
Payer: COMMERCIAL

## 2023-06-14 DIAGNOSIS — N47.8 FORESKIN DOES NOT RETRACT: ICD-10-CM

## 2023-06-14 DIAGNOSIS — N47.1 PHIMOSIS: ICD-10-CM

## 2023-06-14 NOTE — TELEPHONE ENCOUNTER
Called patient to schedule surgery with Dr. Kenyon. Patient requesting 7/24, as he was told this date from provider. Informed patient that date was avaialbe. Patient was agreeable to the date.    Approximate arrival time give: Yes, pt was told that appointment would be in the afternoon.    Location of surgery: [facility]    Pre-Op H&P: [facility]    Post-Op Appt Date: None needed at this time    Patient is aware that the pre-op RN will call 2/3 days prior to surgery with arrival time and instructions: Yes    Packet sent out: Yes, on 6/14/23    Additional Comments: N/A

## 2023-06-16 NOTE — NURSING NOTE
"Procedure: CIRCUMCISION    Date: 7/24/23  [place] [facility] [place]  Provider: Dr. Kenyon     Post op appt: not needed    H&P: PCP  UA/UC: Not needed  Bowel Prep: NA    Medications: to be reviewed at pre-op   Blood thinners: No   Diabetic medications: No   Does pt take: No                          Phentermine                          Ozempic                          Wegovy (Semaglutide)    If yes, \"Hold for 7 days before procedure.  Please consult your prescribing provider if you have questions about holding this medication.\"  Soap: reviewed in clinic  Reviewed when to start clear liquids and when to start NPO: reviewed in clinic  : to be reviewed  24 hour observation: to be reviewed    Pt or family member expressed understanding: Godfrey verbalized understanding of surgical prep information.    Tomasa Fried RN  6/15/2023  7:21 PM       "

## 2023-06-20 ENCOUNTER — VIRTUAL VISIT (OUTPATIENT)
Dept: PSYCHOLOGY | Facility: CLINIC | Age: 33
End: 2023-06-20
Payer: COMMERCIAL

## 2023-06-20 DIAGNOSIS — F33.1 MAJOR DEPRESSIVE DISORDER, RECURRENT EPISODE, MODERATE WITH ANXIOUS DISTRESS (H): Primary | ICD-10-CM

## 2023-06-20 PROCEDURE — 90834 PSYTX W PT 45 MINUTES: CPT | Mod: VID

## 2023-06-20 NOTE — PROGRESS NOTES
[facility]                                     Progress Note    Patient Name: Godfrey Porras  Date: 6/20/23         Service Type: Individual      Session Start Time: 10:05am  Session End Time: 10:57am     Session Length: 52 minutes    Session #: 4    Attendees: Client attended alone    Service Modality:  Video Visit:      Provider verified identity through the following two step process.  Patient provided:  Patient is known previously to provider    Telemedicine Visit: The patient's condition can be safely assessed and treated via synchronous audio and visual telemedicine encounter.      Reason for Telemedicine Visit: Patient has requested telehealth visit and Patient convenience (e.g. access to timely appointments / distance to available provider)    Originating Site (Patient Location): Patient's home    Distant Site (Provider Location): [facility] [place]    Consent:  The patient/guardian has verbally consented to: the potential risks and benefits of telemedicine (video visit) versus in person care; bill my insurance or make self-payment for services provided; and responsibility for payment of non-covered services.     Patient would like the video invitation sent by:  My Chart    Mode of Communication:  Video Conference via [facility]well    Distant Location (Provider):  On-site    As the provider I attest to compliance with applicable laws and regulations related to telemedicine.    DATA  Extended Session (53+ minutes): No  Interactive Complexity: No  Crisis: No        Progress Since Last Session (Related to Symptoms / Goals / Homework):   Symptoms: Worsening depressive symptoms related to outside stimuli with ex-girlfriend    Homework: Achieved / completed to satisfaction      Episode of Care Goals: Satisfactory progress - ACTION (Actively working towards change); Intervened by reinforcing change plan / affirming steps taken     Current / Ongoing  "Stressors and Concerns:   Pt reports ongoing stressors as work and navigating the relationship with his most recent ex-girlfriend. Pt reports mood since last session was \"frustration and anger\" toward himself due to ongoing feelings of depression and \"struggling to feel motivated to do anything\". Pt reports ongoing \"thought spirals\" associated with his ex-girlfriend that result in him feeling disappointed in himself for \"letting\" that relationship \"fail\". Pt and provider discussed core beliefs about himself that contribute to his feelings of passive SI. Pt made statements of ongoing passive SI and created a safety plan with provider this session. Pt appears to have gained insight into his desire to build stronger social connections with the people around him as well as try out a new work schedule that would be more consistent and require him to show up with a set schedule. Pt appears to be working toward emotion regulation and finding emotions in his body. Pt denies any active SI, SIB, or HI since last session. Pt contracts for safety and is willing to reach out to support if needed. See below for plan of care until next appointment.     Treatment Objective(s) Addressed in This Session:   identify at least 5 triggers for anxiety  Increase interest, engagement, and pleasure in doing things  Decrease frequency and intensity of feeling down, depressed, hopeless  Feel less tired and more energy during the day   Identify negative self-talk and behaviors: challenge core beliefs, myths, and actions  Improve concentration, focus, and mindfulness in daily activities   Decrease thoughts that you'd be better off dead or of suicide / self-harm  talk to at least two others about losses and coping     Intervention:   CBT: thinking pattern discussion, core belief about self discussion, trigger for thinking pattern identification, behavioral activation and   Motivational Interviewing: evoking change talk, OARS, short term " goals    Assessments completed prior to visit:  The following assessments were completed by patient for this visit:  PROMIS 10-Global Health (all questions and answers displayed):       3/29/2022     9:02 AM 8/8/2022     9:50 AM 5/31/2023    10:16 AM 6/7/2023     1:18 PM   PROMIS 10   In general, would you say your health is: Good Fair  Fair   In general, would you say your quality of life is: Good Good  Good   In general, how would you rate your physical health? Fair Fair  Good   In general, how would you rate your mental health, including your mood and your ability to think? Fair Fair  Poor   In general, how would you rate your satisfaction with your social activities and relationships? Fair Poor  Poor   In general, please rate how well you carry out your usual social activities and roles Poor Good  Poor   To what extent are you able to carry out your everyday physical activities such as walking, climbing stairs, carrying groceries, or moving a chair? Completely Completely  Mostly   In the past 7 days, how often have you been bothered by emotional problems such as feeling anxious, depressed, or irritable? Often Sometimes  Often   In the past 7 days, how would you rate your fatigue on average? Moderate Moderate  Severe   In the past 7 days, how would you rate your pain on average, where 0 means no pain, and 10 means worst imaginable pain? 2 3  3   In general, would you say your health is: 3 2 3 2   In general, would you say your quality of life is: 3 3 3 3   In general, how would you rate your physical health? 2 2 3 3   In general, how would you rate your mental health, including your mood and your ability to think? 2 2 1 1   In general, how would you rate your satisfaction with your social activities and relationships? 2 1 2 1   In general, please rate how well you carry out your usual social activities and roles. (This includes activities at home, at work and in your community, and responsibilities as a parent,  child, spouse, employee, friend, etc.) 1 3 2 1   To what extent are you able to carry out your everyday physical activities such as walking, climbing stairs, carrying groceries, or moving a chair? 5 5 4 4   In the past 7 days, how often have you been bothered by emotional problems such as feeling anxious, depressed, or irritable? 4 3 5 4   In the past 7 days, how would you rate your fatigue on average? 3 3 5 4   In the past 7 days, how would you rate your pain on average, where 0 means no pain, and 10 means worst imaginable pain? 2 3 3 3   Global Mental Health Score 9 9 7 7   Global Physical Health Score 14 14 12 13   PROMIS TOTAL - SUBSCORES 23 23 19 20         ASSESSMENT: Current Emotional / Mental Status (status of significant symptoms):   Risk status (Self / Other harm or suicidal ideation)   Patient denies current fears or concerns for personal safety.   Patient reports the following current or recent suicidal ideation or behaviors: pt endorses passive SI and denies having any intention or plan of harming himself. Pt was able to verbalize resources he could reach out to should he need them. Pt was able to identify triggers for his passive SI and ways he is able to cope through them. Pt contracts for safety. Pt and provider put together a safety plan and pt agrees to use resources if needed.   Patient denies current or recent homicidal ideation or behaviors.   Patient denies current or recent self injurious behavior or ideation.   Patient denies other safety concerns.   Patient reports there has been no change in risk factors since their last session.     Patient reports there has been no change in protective factors since their last session.     A safety and risk management plan has been developed including: Patient consented to co-developed safety plan on 6/20/23.  Safety and risk management plan was reviewed.   Patient agreed to use safety plan should any safety concerns arise.  A copy was made available to  the patient.     Appearance:   Appropriate    Eye Contact:   Good    Psychomotor Behavior: Normal    Attitude:   Cooperative    Orientation:   All   Speech    Rate / Production: Normal     Volume:  Normal    Mood:    Depressed    Affect:    Appropriate    Thought Content:  Clear    Thought Form:  Coherent  Logical    Insight:    Good      Medication Review:   No changes to current psychiatric medication(s)     Medication Compliance:   Yes     Changes in Health Issues:   None reported     Chemical Use Review:   Substance Use: Chemical use reviewed, no active concerns identified . Pt feels in control of his  substance use and does not feel that it is an issue to discuss at this time.   Tobacco Use: No current tobacco use.      Diagnosis:  Major Depressive Disorder, Recurrent Episode, Moderate with anxious distress    Collateral Reports Completed:   Not Applicable    PLAN: (Patient Tasks / Therapist Tasks / Other)  1. Pt is to text one person over the next week and have a small conversation about how they are going. Pt is to identify emotions/feelings that come up for him   2. Pt is to follow safety plan and reach out for support if he needs it.        Jeannette Gilliam, Psychotherapist Trainee, [facility]  6/20/23    This note has been reviewed and I agree with the plan of care. This note is co-signed by Valentine King MA, [facility] Supervisor, on: 6/22/20223                                                         ______________________________________________________________________    Individual Treatment Plan    Patient's Name: Godfrey Porras  YOB: 1990    Date of Creation: 6/13/23  Date Treatment Plan Last Reviewed/Revised: 6/13/23    DSM5 Diagnoses: 296.32 (F33.1) Major Depressive Disorder, Recurrent Episode, Moderate With anxious distress  Psychosocial / Contextual Factors: loss of important relationship, stress at work and stress within important roles, death of close friends  PROMIS (reviewed every 90  days): 20    Referral / Collaboration:  Referral to another professional/service is not indicated at this time..    Anticipated number of session for this episode of care: 9-12 sessions  Anticipation frequency of session: Weekly  Anticipated Duration of each session: 38-52 minutes  Treatment plan will be reviewed in 90 days or when goals have been changed.       MeasurableTreatment Goal(s) related to diagnosis / functional impairment(s)  Goal 1: Patient will decrease his depressive symptoms by 50%.    I will know I've met my goal when I can do things I enjoy doing again.      Objective #A    Patient will Decrease frequency and intensity of feeling down, depressed, hopeless.  Status: New - Date: 6/13/23     Intervention(s)  Therapist will teach emotion regulation skills and coping skills for his depressive symptoms. Pt will be assigned homework related to practicing skills outside of therapy.    Objective #B  Patient will Identify negative self-talk and behaviors: challenge core beliefs, myths, and actions.   Status: New - Date: 6/13/23     Intervention(s)  Therapist will teach about thinking patterns and core beliefs/automatic thoughts. Pt will be assigned homework related to recognizing emotions and thinking patterns within himself to discuss during session.    Objective #C  Patient will Decrease thoughts that you'd be better off dead or of suicide / self-harm.   Status: New - Date: 6/13/23     Intervention(s)  Therapist will teach emotion regulation skills, distress tolerance skills, and thought modification to replace his maladaptive automatic thoughts. Pt will be assigned homework of thought logs and practicing skills outside of therapy.    Objective #D   Patient will use cognitive strategies identified in therapy to challenge anxious thoughts    Status: New - Date: 6/13/23     Intervention(s)  Therapist will assign homework related to thought challenging outside of the therapy room using emotion regulation  "skills learned.    Objective #E  Patient will identify 5 values that are important to him and the way he lives his life. Pt will identify ways he can live with those values in mind.    Status: New - Date: 6/13/23     Intervention(s)  Therapist will introduce ACT and value identification to pt. Pt will be assigned homework related to behavioral activation.    Objective #F  Patient will talk to at least two others about losses and coping.   Status: New - Date: 6/13/23     Intervention(s)  Therapist will assign homework related to pt reaching out and building stronger social support in his life..      Patient has reviewed and agreed to the above plan.      Jeannette Gilliam, Psychotherapist Trainee, [facility]    June 13, 2023  __________________________________________________________________________________      [facility]                                       Godfrey Porras     SAFETY PLAN:   \"I am going to shoot myself\"   Emotions    Anger toward himself    Frustration   Come up when he thinks about    People     Sj     Millie and other people he cares about are to a lesser degree   \"thought spirals\"    Something I say or do reminds me of sj    I wish I would have....    Then it becomes and overwhelming feeling of anger and frustration    \"emotion onion\"     Sadness     Dispar   2018/2019 was last urge   **National suicide hotline (Call or text them) and 911 - call  Step 1: Warning signs / cues (Thoughts, images, mood, situation, behavior) that a crisis may be developing:    Thoughts: \"People would be better off without me\", \"I'm a burden\", \"I can't do this anymore\" and \"Nothing makes it better\"    Images: flashbacks    Thinking Processes: ruminations (can't stop thinking about my problems): financial concerns and job concerns, racing thoughts, intrusive thoughts (bothersome, unwanted thoughts that come out of nowhere): thoughts of self harm and highly critical and negative thoughts: self " "judgemental thoughts and self distain    Mood: worsening depression, hopelessness, intense anger and disinhibited (not caring about things or consequences)    Behaviors: isolating/withdrawing , using drugs, using alcohol, aggression, not taking care of myself and not taking care of my responsibilities    Situations: relationship problems and financial stress   Step 2: Coping strategies - Things I can do to take my mind off of my problems without contacting another person (relaxation technique, physical activity):    Distress Tolerance Strategies:  relaxation activities: distraction activities in his home and sensory based activities/self-soothe with five senses: doing something that will taste sour and ground self using 5-4-3-2-1    Physical Activities: go for a walk and deep breathing    Focus on helpful thoughts:  \"This is temporary\", \"I will get through this\", \"Ride the wave\" and remind myself of what is important to me: family, job  Step 3: People and social settings that provide distraction:   Name: best friend Phone: pt has phone number in his phone    movie theater, coffee shop, park, library and gym    Step 4: Remind myself of people and things that are important to me and worth living for:  Family and my best friends.    Step 5: When I am in crisis, I can ask these people to help me use my safety plan:   Name: father Phone: pt has phone number in phone  Step 6: Making the environment safe:     remove drugs, remove access to firearms: call father to take gun out of house and be around others  Step 7: Professionals or agencies I can contact during a crisis:    Suicide Prevention Lifeline: Call or Text 988   Local Crisis Services: 911, hospital    Call 911 or go to my nearest emergency department.   I helped develop this safety plan and agree to use it when needed.  I have been given a copy of this plan.      Client signature ________Godfrey Porras___________  Today s date:  6/20/2023  Completed by Provider " Name/ Credentials:  Jeannette Gilliam, Psychotherapist Trainee, [facility]  June 20, 2023  Adapted from Safety Plan Template 2008 Candy Borges and Mart Yoder is reprinted with the express permission of the authors.  No portion of the Safety Plan Template may be reproduced without the express, written permission.  You can contact the authors at [email or web address] or [email or web address].

## 2023-06-21 ENCOUNTER — DOCUMENTATION ONLY (OUTPATIENT)
Dept: PSYCHOLOGY | Facility: CLINIC | Age: 33
End: 2023-06-21
Payer: COMMERCIAL

## 2023-06-21 ENCOUNTER — TELEPHONE (OUTPATIENT)
Dept: FAMILY MEDICINE | Facility: CLINIC | Age: 33
End: 2023-06-21
Payer: COMMERCIAL

## 2023-06-21 DIAGNOSIS — F33.1 MAJOR DEPRESSIVE DISORDER, RECURRENT EPISODE, MODERATE WITH ANXIOUS DISTRESS (H): Primary | ICD-10-CM

## 2023-06-21 PROCEDURE — 99207 PR NO BILLABLE SERVICE THIS VISIT: CPT

## 2023-06-21 NOTE — PROGRESS NOTES
Case Consultation Record       Client Name: Godfrey Porras   Date:  6/20/23    Diagnosis: Major Depressive Disorder, recurrent episode, moderate with anxious distress    Therapist: Jeannette Gilliam, Psychotherapist Trainee, [facility]      Team Members Present:  Valentine King [facility]    Purpose:   Risk Management and treatment planning    Recommendations:  To create a safety plan with pt so he has access to crisis resources should he need them in the future with his previous hx of mental health concerns. Provider was also encouraged to continue assessing for safety risk each appointment and bring up concerns directly with pt in the future.       Jeannette Gilliam, Psychotherapist Trainee, [facility]   June 20, 2023

## 2023-06-21 NOTE — TELEPHONE ENCOUNTER
Patient Quality Outreach    Patient is due for the following:   Asthma  -  ACT needed  Physical Preventive Adult Physical      Topic Date Due     Pneumococcal Vaccine (2 - PCV) 04/10/2018     Hepatitis B Vaccine (2 of 3 - 19+ 3-dose series) 02/14/2022     COVID-19 Vaccine (3 - Booster for Suresh series) 03/14/2022       Next Steps:   Schedule a Adult Preventative    Type of outreach:    Sent letter. and Copy of ACT mailed to patient.      Questions for provider review:    None           Jose Eduardo Villa MA

## 2023-06-21 NOTE — LETTER
June 21, 2023    Godfrey Porras  [address]  [place] [address]    Dear Godfrey,    At [facility] we care about your health and are committed to providing quality patient care.     Here is a list of Health Maintenance topics that are due now or due soon:  Health Maintenance Due   Topic Date Due    Pneumococcal Vaccine: Pediatrics (0 to 5 Years) and At-Risk Patients (6 to 64 Years) (2 - PCV) 04/10/2018    HEPATITIS B IMMUNIZATION (2 of 3 - 19+ 3-dose series) 02/14/2022    COVID-19 Vaccine (3 - Booster for Suresh series) 03/14/2022    YEARLY PREVENTIVE VISIT  01/17/2023    ASTHMA ACTION PLAN  01/17/2023        We are recommending that you:  Schedule a WELLNESS (Preventative/Physical) APPOINTMENT with your primary care provider. If you go elsewhere for your wellness appointments then please disregard this reminder    ,   Complete the attached ASTHMA CONTROL TEST.  If your total score is 19 or less or you have been to the ER or urgent care for your asthma, then please schedule an asthma followup appointment with your primary care provider.    , and   Schedule a Nurse-Only appointment to update your immunizations: Your records indicate that you are not up to date with your immunizations, please schedule a nurse-only appointment to get these updated or update them at your next office visit. If this is incorrect, please disregard.    To schedule an appointment or discuss this further, you may contact us by phone at the [facility] at [phone] or online through the patient [email or web address] @ [email or web address]    Thank you for trusting [facility] and we appreciate the opportunity to serve you.  We look forward to supporting your healthcare needs in the future.    Your partners in health,      Quality Committee at [facility]

## 2023-06-27 ENCOUNTER — OFFICE VISIT (OUTPATIENT)
Dept: PSYCHOLOGY | Facility: CLINIC | Age: 33
End: 2023-06-27
Payer: COMMERCIAL

## 2023-06-27 DIAGNOSIS — F33.1 MAJOR DEPRESSIVE DISORDER, RECURRENT EPISODE, MODERATE WITH ANXIOUS DISTRESS (H): Primary | ICD-10-CM

## 2023-06-27 PROCEDURE — 90834 PSYTX W PT 45 MINUTES: CPT

## 2023-06-27 NOTE — PROGRESS NOTES
"[facility] Counseling                                     Progress Note    Patient Name: Godfrey Porras  Date: 6/27/23         Service Type: Individual      Session Start Time: 10:12 am Session End Time: 11:00am     Session Length: 48 minutes    Session #: 5    Attendees: Client attended alone    Service Modality:  In person    DATA  Extended Session (53+ minutes): No  Interactive Complexity: No  Crisis: No        Progress Since Last Session (Related to Symptoms / Goals / Homework):   Symptoms: No change in depressive symptoms or anxiety symptoms at this time    Homework: Achieved / completed to satisfaction      Episode of Care Goals: Satisfactory progress - ACTION (Actively working towards change); Intervened by reinforcing change plan / affirming steps taken     Current / Ongoing Stressors and Concerns:   Pt reports ongoing stressors as underlying anxiety and general mental health. Pt reports mood since last session has been \"anxious\" due to feel nervous about social interactions and feeling like \"not doing anything\". Pt reports he played in a golf tournament with one of his closest friends over the last week and appeared to enjoy being around that one friend while being outside and enjoying the weather. Pt and provider discussed the anxiety and thought patterns around socializing as well as small steps to take to get out of his apartment more on his own; pt also discussed things he has enjoyed doing in the past and things he would like to do in the future. Pt appears to have gained insight into his moods as well as how they are affecting his behavior. Pt appears to be working toward gaining more confidence and comfortability with social interaction as well as gaining a sense of support from his support system. Pt denies any active SI, SIB, or HI since last session. Pt reports passive SI thoughts a few times since last appointment and was able to control them with ease. Pt contracted for safety " during appointment. See below for plan of care until next appointment       Treatment Objective(s) Addressed in This Session:   attend and participate in social or recreational activities at least one time over the next week  Increase interest, engagement, and pleasure in doing things  Decrease frequency and intensity of feeling down, depressed, hopeless  Feel less tired and more energy during the day   Identify negative self-talk and behaviors: challenge core beliefs, myths, and actions  Improve concentration, focus, and mindfulness in daily activities   Decrease thoughts that you'd be better off dead or of suicide / self-harm     Intervention:   CBT: discussion on how thinking impacts behavior, behavioral activation discusion  Motivational Interviewing: reframe situations, evoking change talk, OARS, working to create a stronger sense of internal motivation    Assessments completed prior to visit:  The following assessments were completed by patient for this visit:  PROMIS 10-Global Health (all questions and answers displayed):       3/29/2022     9:02 AM 8/8/2022     9:50 AM 5/31/2023    10:16 AM 6/7/2023     1:18 PM   PROMIS 10   In general, would you say your health is: Good Fair  Fair   In general, would you say your quality of life is: Good Good  Good   In general, how would you rate your physical health? Fair Fair  Good   In general, how would you rate your mental health, including your mood and your ability to think? Fair Fair  Poor   In general, how would you rate your satisfaction with your social activities and relationships? Fair Poor  Poor   In general, please rate how well you carry out your usual social activities and roles Poor Good  Poor   To what extent are you able to carry out your everyday physical activities such as walking, climbing stairs, carrying groceries, or moving a chair? Completely Completely  Mostly   In the past 7 days, how often have you been bothered by emotional problems such as  feeling anxious, depressed, or irritable? Often Sometimes  Often   In the past 7 days, how would you rate your fatigue on average? Moderate Moderate  Severe   In the past 7 days, how would you rate your pain on average, where 0 means no pain, and 10 means worst imaginable pain? 2 3  3   In general, would you say your health is: 3 2 3 2   In general, would you say your quality of life is: 3 3 3 3   In general, how would you rate your physical health? 2 2 3 3   In general, how would you rate your mental health, including your mood and your ability to think? 2 2 1 1   In general, how would you rate your satisfaction with your social activities and relationships? 2 1 2 1   In general, please rate how well you carry out your usual social activities and roles. (This includes activities at home, at work and in your community, and responsibilities as a parent, child, spouse, employee, friend, etc.) 1 3 2 1   To what extent are you able to carry out your everyday physical activities such as walking, climbing stairs, carrying groceries, or moving a chair? 5 5 4 4   In the past 7 days, how often have you been bothered by emotional problems such as feeling anxious, depressed, or irritable? 4 3 5 4   In the past 7 days, how would you rate your fatigue on average? 3 3 5 4   In the past 7 days, how would you rate your pain on average, where 0 means no pain, and 10 means worst imaginable pain? 2 3 3 3   Global Mental Health Score 9 9 7 7   Global Physical Health Score 14 14 12 13   PROMIS TOTAL - SUBSCORES 23 23 19 20         ASSESSMENT: Current Emotional / Mental Status (status of significant symptoms):   Risk status (Self / Other harm or suicidal ideation)   Patient denies current fears or concerns for personal safety.   Patient reports the following current or recent suicidal ideation or behaviors: pt endorses passive SI and denies having any intention or plan of harming himself. Pt agrees to use resources and follow safety plan  if needed.   Patient denies current or recent homicidal ideation or behaviors.   Patient denies current or recent self injurious behavior or ideation.   Patient denies other safety concerns.   Patient reports there has been no change in risk factors since their last session.     Patient reports there has been no change in protective factors since their last session.     A safety and risk management plan has been developed including: Patient consented to co-developed safety plan on 6/20/23.  Safety and risk management plan was reviewed.   Patient agreed to use safety plan should any safety concerns arise.  A copy was made available to the patient.     Appearance:   Appropriate    Eye Contact:   Good    Psychomotor Behavior: Normal    Attitude:   Cooperative    Orientation:   All   Speech    Rate / Production: Normal     Volume:  Normal    Mood:    Anxious  Depressed    Affect:    Appropriate    Thought Content:  Clear    Thought Form:  Coherent  Logical    Insight:    Good      Medication Review:   No changes to current psychiatric medication(s)     Medication Compliance:   Yes     Changes in Health Issues:   None reported     Chemical Use Review:   Substance Use: Chemical use reviewed, no active concerns identified . Pt feels in control of his substance use and does not feel that it is an issue to discuss at this time.   Tobacco Use: No current tobacco use.      Diagnosis:  Major Depressive Disorder, Recurrent Episode, Moderate with anxious distress    Collateral Reports Completed:   Not Applicable    PLAN: (Patient Tasks / Therapist Tasks / Other)  1. Pt is to go to the [facility] and think about what comes up for him after attending. Pt is to come ready to discuss next appointment.  2. Pt is to think about initiating contact with friend Wilfrido over the next week, one time. Pt is to identify feelings that come up for him when thinking about making contact with friend.        Jeannette Gilliam, Psychotherapist  Trainee, [facility]  6/27/23    This note has been reviewed and I agree with the plan of care. This note is co-signed by Valentine King MA, [facility] Supervisor, on: 6/30/2023                                                         ______________________________________________________________________    Individual Treatment Plan    Patient's Name: Godfrey Porras  YOB: 1990    Date of Creation: 6/13/23  Date Treatment Plan Last Reviewed/Revised: 6/13/23    DSM5 Diagnoses: 296.32 (F33.1) Major Depressive Disorder, Recurrent Episode, Moderate With anxious distress  Psychosocial / Contextual Factors: loss of important relationship, stress at work and stress within important roles, death of close friends  PROMIS (reviewed every 90 days): 20    Referral / Collaboration:  Referral to another professional/service is not indicated at this time..    Anticipated number of session for this episode of care: 9-12 sessions  Anticipation frequency of session: Weekly  Anticipated Duration of each session: 38-52 minutes  Treatment plan will be reviewed in 90 days or when goals have been changed.       MeasurableTreatment Goal(s) related to diagnosis / functional impairment(s)  Goal 1: Patient will decrease his depressive symptoms by 50%.    I will know I've met my goal when I can do things I enjoy doing again.      Objective #A    Patient will Decrease frequency and intensity of feeling down, depressed, hopeless.  Status: New - Date: 6/13/23     Intervention(s)  Therapist will teach emotion regulation skills and coping skills for his depressive symptoms. Pt will be assigned homework related to practicing skills outside of therapy.    Objective #B  Patient will Identify negative self-talk and behaviors: challenge core beliefs, myths, and actions.   Status: New - Date: 6/13/23     Intervention(s)  Therapist will teach about thinking patterns and core beliefs/automatic thoughts. Pt will be assigned homework related to  "recognizing emotions and thinking patterns within himself to discuss during session.    Objective #C  Patient will Decrease thoughts that you'd be better off dead or of suicide / self-harm.   Status: New - Date: 6/13/23     Intervention(s)  Therapist will teach emotion regulation skills, distress tolerance skills, and thought modification to replace his maladaptive automatic thoughts. Pt will be assigned homework of thought logs and practicing skills outside of therapy.    Objective #D   Patient will use cognitive strategies identified in therapy to challenge anxious thoughts    Status: New - Date: 6/13/23     Intervention(s)  Therapist will assign homework related to thought challenging outside of the therapy room using emotion regulation skills learned.    Objective #E  Patient will identify 5 values that are important to him and the way he lives his life. Pt will identify ways he can live with those values in mind.    Status: New - Date: 6/13/23     Intervention(s)  Therapist will introduce ACT and value identification to pt. Pt will be assigned homework related to behavioral activation.    Objective #F  Patient will talk to at least two others about losses and coping.   Status: New - Date: 6/13/23     Intervention(s)  Therapist will assign homework related to pt reaching out and building stronger social support in his life..      Patient has reviewed and agreed to the above plan.      Jeannette Gilliam, Psychotherapist Trainee, [facility]    June 13, 2023  __________________________________________________________________________________      [facility] Counseling                                       Godfrey Porras     SAFETY PLAN:   \"I am going to shoot myself\"   Emotions    Anger toward himself    Frustration   Come up when he thinks about    People     Sj     Millie and other people he cares about are to a lesser degree   \"thought spirals\"    Something I say or do reminds me of sj    I wish I " "would have....    Then it becomes and overwhelming feeling of anger and frustration    \"emotion onion\"     Sadness     Dispar   2018/2019 was last urge   **National suicide hotline (Call or text them) and 911 - call  Step 1: Warning signs / cues (Thoughts, images, mood, situation, behavior) that a crisis may be developing:    Thoughts: \"People would be better off without me\", \"I'm a burden\", \"I can't do this anymore\" and \"Nothing makes it better\"    Images: flashbacks    Thinking Processes: ruminations (can't stop thinking about my problems): financial concerns and job concerns, racing thoughts, intrusive thoughts (bothersome, unwanted thoughts that come out of nowhere): thoughts of self harm and highly critical and negative thoughts: self judgemental thoughts and self distain    Mood: worsening depression, hopelessness, intense anger and disinhibited (not caring about things or consequences)    Behaviors: isolating/withdrawing , using drugs, using alcohol, aggression, not taking care of myself and not taking care of my responsibilities    Situations: relationship problems and financial stress   Step 2: Coping strategies - Things I can do to take my mind off of my problems without contacting another person (relaxation technique, physical activity):    Distress Tolerance Strategies:  relaxation activities: distraction activities in his home and sensory based activities/self-soothe with five senses: doing something that will taste sour and ground self using 5-4-3-2-1    Physical Activities: go for a walk and deep breathing    Focus on helpful thoughts:  \"This is temporary\", \"I will get through this\", \"Ride the wave\" and remind myself of what is important to me: family, job  Step 3: People and social settings that provide distraction:   Name: best friend Phone: pt has phone number in his phone    movie theater, coffee shop, park, library and gym    Step 4: Remind myself of people and things that are important to me and " worth living for:  Family and my best friends.    Step 5: When I am in crisis, I can ask these people to help me use my safety plan:   Name: father Phone: pt has phone number in phone  Step 6: Making the environment safe:     remove drugs, remove access to firearms: call father to take gun out of house and be around others  Step 7: Professionals or agencies I can contact during a crisis:    Suicide Prevention Lifeline: Call or Text 988   Local Crisis Services: 911, hospital    Call 911 or go to my nearest emergency department.   I helped develop this safety plan and agree to use it when needed.  I have been given a copy of this plan.      Client signature ________Jacob Dasilveira___________  Today s date:  6/20/2023  Completed by Provider Name/ Credentials:  Jeannette Gilliam, Psychotherapist Trainee, [facility]  June 20, 2023  Adapted from Safety Plan Template 2008 Candy Borges and Mart Yoder is reprinted with the express permission of the authors.  No portion of the Safety Plan Template may be reproduced without the express, written permission.  You can contact the authors at [email or web address] or [email or web address].

## 2023-06-28 DIAGNOSIS — F90.2 ATTENTION DEFICIT HYPERACTIVITY DISORDER (ADHD), COMBINED TYPE: ICD-10-CM

## 2023-06-28 NOTE — TELEPHONE ENCOUNTER
Last office visit: 4/21/2023     Future Office Visit:        CSA -- Patient Level:    CSA: None found at the patient level.             Routing refill request to provider for review/approval because:  Drug not on the [facility] refill protocol     Sera Mckeon RN, BSN  [facility]

## 2023-06-29 RX ORDER — LISDEXAMFETAMINE DIMESYLATE 20 MG/1
20 CAPSULE ORAL EVERY MORNING
Qty: 30 CAPSULE | Refills: 0 | Status: SHIPPED | OUTPATIENT
Start: 2023-06-29 | End: 2023-09-21

## 2023-07-11 ENCOUNTER — OFFICE VISIT (OUTPATIENT)
Dept: PSYCHOLOGY | Facility: CLINIC | Age: 33
End: 2023-07-11
Payer: COMMERCIAL

## 2023-07-11 ENCOUNTER — TELEPHONE (OUTPATIENT)
Dept: UROLOGY | Facility: CLINIC | Age: 33
End: 2023-07-11
Payer: COMMERCIAL

## 2023-07-11 DIAGNOSIS — F33.1 MAJOR DEPRESSIVE DISORDER, RECURRENT EPISODE, MODERATE WITH ANXIOUS DISTRESS (H): Primary | ICD-10-CM

## 2023-07-11 PROCEDURE — 90834 PSYTX W PT 45 MINUTES: CPT

## 2023-07-11 NOTE — PROGRESS NOTES
"[facility]                                     Progress Note    Patient Name: Godfrey Porras  Date: 7/11/23         Service Type: Individual      Session Start Time: 12:43pm Session End Time: 1:30pm     Session Length: 47 minutes    Session #: 6    Attendees: Client attended alone    Service Modality:  In person    DATA  Extended Session (53+ minutes): No  Interactive Complexity: No  Crisis: No        Progress Since Last Session (Related to Symptoms / Goals / Homework):   Symptoms: No change in depressive symptoms since last appointment - see below for more information    Homework: Achieved / completed to satisfaction      Episode of Care Goals: Satisfactory progress - ACTION (Actively working towards change); Intervened by reinforcing change plan / affirming steps taken     Current / Ongoing Stressors and Concerns:   Pt reports ongoing stressors as ongoing depressive symptoms and the socializing he has been doing for the holiday and his birthday last week. Pt reports mood since last session \"has been okay\" stating he has felt no changes to his mental health despite engaging in many socialization activities. Pt reports engaging with friends and family over the last week and continues to \"not feel motivated or excited about doing anything\". Pt and provider discussed the last time he felt excited about life and what was that like for him. Pt appears to have gained insight into his grieving process he is continuing to go through from his previous relationship and how that has continued to impact all the important areas of his life. Pt appears to be working toward gaining excitement for life by engaging in activities he used to enjoy doing and trying to gain more stability within his life. Pt denies any active SI, SIB, or HI since last session. Pt endorses ongoing passive SI during appointment on 7/11/23, to the same degree that it has been. Pt was willing to contract for safety and agreed to " use safety plan should he need to. See below for plan of care until next appointment.     Treatment Objective(s) Addressed in This Session:   identify 2 fears / thoughts that contribute to feeling anxious  Increase interest, engagement, and pleasure in doing things  Feel less tired and more energy during the day   Identify negative self-talk and behaviors: challenge core beliefs, myths, and actions  Improve concentration, focus, and mindfulness in daily activities   Decrease thoughts that you'd be better off dead or of suicide / self-harm     Intervention:   CBT: thinking and behavior pattern discussion, core beliefs about self discussion  Motivational Interviewing: reframe situations, evoking change talk, OARS, working to create a stronger sense of internal motivation    Assessments completed prior to visit:  The following assessments were completed by patient for this visit:  PROMIS 10-Global Health (all questions and answers displayed):       3/29/2022     9:02 AM 8/8/2022     9:50 AM 5/31/2023    10:16 AM 6/7/2023     1:18 PM   PROMIS 10   In general, would you say your health is: Good Fair  Fair   In general, would you say your quality of life is: Good Good  Good   In general, how would you rate your physical health? Fair Fair  Good   In general, how would you rate your mental health, including your mood and your ability to think? Fair Fair  Poor   In general, how would you rate your satisfaction with your social activities and relationships? Fair Poor  Poor   In general, please rate how well you carry out your usual social activities and roles Poor Good  Poor   To what extent are you able to carry out your everyday physical activities such as walking, climbing stairs, carrying groceries, or moving a chair? Completely Completely  Mostly   In the past 7 days, how often have you been bothered by emotional problems such as feeling anxious, depressed, or irritable? Often Sometimes  Often   In the past 7 days, how  would you rate your fatigue on average? Moderate Moderate  Severe   In the past 7 days, how would you rate your pain on average, where 0 means no pain, and 10 means worst imaginable pain? 2 3  3   In general, would you say your health is: 3 2 3 2   In general, would you say your quality of life is: 3 3 3 3   In general, how would you rate your physical health? 2 2 3 3   In general, how would you rate your mental health, including your mood and your ability to think? 2 2 1 1   In general, how would you rate your satisfaction with your social activities and relationships? 2 1 2 1   In general, please rate how well you carry out your usual social activities and roles. (This includes activities at home, at work and in your community, and responsibilities as a parent, child, spouse, employee, friend, etc.) 1 3 2 1   To what extent are you able to carry out your everyday physical activities such as walking, climbing stairs, carrying groceries, or moving a chair? 5 5 4 4   In the past 7 days, how often have you been bothered by emotional problems such as feeling anxious, depressed, or irritable? 4 3 5 4   In the past 7 days, how would you rate your fatigue on average? 3 3 5 4   In the past 7 days, how would you rate your pain on average, where 0 means no pain, and 10 means worst imaginable pain? 2 3 3 3   Global Mental Health Score 9 9 7 7   Global Physical Health Score 14 14 12 13   PROMIS TOTAL - SUBSCORES 23 23 19 20         ASSESSMENT: Current Emotional / Mental Status (status of significant symptoms):   Risk status (Self / Other harm or suicidal ideation)   Patient denies current fears or concerns for personal safety.   Patient reports the following current or recent suicidal ideation or behaviors: pt endorses passive SI and denies having any intention or plan of harming himself during appointment on 7/11/23. Pt agrees to use resources and follow safety plan if needed.   Patient denies current or recent homicidal  ideation or behaviors.   Patient denies current or recent self injurious behavior or ideation.   Patient denies other safety concerns.   Patient reports there has been no change in risk factors since their last session.     Patient reports there has been no change in protective factors since their last session.     A safety and risk management plan has been developed including: Patient consented to co-developed safety plan on 6/20/23.  Safety and risk management plan was reviewed.   Patient agreed to use safety plan should any safety concerns arise.  A copy was made available to the patient.     Appearance:   Appropriate    Eye Contact:   Good    Psychomotor Behavior: Normal    Attitude:   Cooperative    Orientation:   All   Speech    Rate / Production: Normal     Volume:  Normal    Mood:    Anxious  Depressed    Affect:    Appropriate    Thought Content:  Clear    Thought Form:  Coherent  Logical    Insight:    Good      Medication Review:   No changes to current psychiatric medication(s)     Medication Compliance:   Yes     Changes in Health Issues:   None reported     Chemical Use Review:   Substance Use: Chemical use reviewed, no active concerns identified . Pt feels in control of his substance use and does not feel that it is an issue to discuss at this time.   Tobacco Use: No current tobacco use.      Diagnosis:  Major Depressive Disorder, Recurrent Episode, Moderate with anxious distress    Collateral Reports Completed:   Not Applicable    PLAN: (Patient Tasks / Therapist Tasks / Other)  1. Pt is to continue reaching out to at least two friends over the next week in any way that feels comfortable for him.  2. Pt is to work on booking one more job before next appointment to bring a stronger sense of financial stability in his life.        Jeannette Gilliam, Psychotherapist Trainee, [facility]  7/11/23    This note has been reviewed and I agree with the plan of care. This note is co-signed by Valentine King MA, [facility]  Supervisor, on: 7/12/2023                                                         ______________________________________________________________________    Individual Treatment Plan    Patient's Name: Godfrey Porras  YOB: 1990    Date of Creation: 6/13/23  Date Treatment Plan Last Reviewed/Revised: 6/13/23    DSM5 Diagnoses: 296.32 (F33.1) Major Depressive Disorder, Recurrent Episode, Moderate With anxious distress  Psychosocial / Contextual Factors: loss of important relationship, stress at work and stress within important roles, death of close friends  PROMIS (reviewed every 90 days): 20    Referral / Collaboration:  Referral to another professional/service is not indicated at this time..    Anticipated number of session for this episode of care: 9-12 sessions  Anticipation frequency of session: Weekly  Anticipated Duration of each session: 38-52 minutes  Treatment plan will be reviewed in 90 days or when goals have been changed.       MeasurableTreatment Goal(s) related to diagnosis / functional impairment(s)  Goal 1: Patient will decrease his depressive symptoms by 50%.    I will know I've met my goal when I can do things I enjoy doing again.      Objective #A    Patient will Decrease frequency and intensity of feeling down, depressed, hopeless.  Status: New - Date: 6/13/23     Intervention(s)  Therapist will teach emotion regulation skills and coping skills for his depressive symptoms. Pt will be assigned homework related to practicing skills outside of therapy.    Objective #B  Patient will Identify negative self-talk and behaviors: challenge core beliefs, myths, and actions.   Status: New - Date: 6/13/23     Intervention(s)  Therapist will teach about thinking patterns and core beliefs/automatic thoughts. Pt will be assigned homework related to recognizing emotions and thinking patterns within himself to discuss during session.    Objective #C  Patient will Decrease thoughts that you'd be  "better off dead or of suicide / self-harm.   Status: New - Date: 6/13/23     Intervention(s)  Therapist will teach emotion regulation skills, distress tolerance skills, and thought modification to replace his maladaptive automatic thoughts. Pt will be assigned homework of thought logs and practicing skills outside of therapy.    Objective #D   Patient will use cognitive strategies identified in therapy to challenge anxious thoughts    Status: New - Date: 6/13/23     Intervention(s)  Therapist will assign homework related to thought challenging outside of the therapy room using emotion regulation skills learned.    Objective #E  Patient will identify 5 values that are important to him and the way he lives his life. Pt will identify ways he can live with those values in mind.    Status: New - Date: 6/13/23     Intervention(s)  Therapist will introduce ACT and value identification to pt. Pt will be assigned homework related to behavioral activation.    Objective #F  Patient will talk to at least two others about losses and coping.   Status: New - Date: 6/13/23     Intervention(s)  Therapist will assign homework related to pt reaching out and building stronger social support in his life..      Patient has reviewed and agreed to the above plan.      Jeannette Gilliam, Psychotherapist Trainee, [facility]    June 13, 2023  __________________________________________________________________________________       [facility] [place]                                       Godfrey Porras     SAFETY PLAN:   \"I am going to shoot myself\"   Emotions    Anger toward himself    Frustration   Come up when he thinks about    People     Sj     Millie and other people he cares about are to a lesser degree   \"thought spirals\"    Something I say or do reminds me of sj    I wish I would have....    Then it becomes and overwhelming feeling of anger and frustration    \"emotion onion\"     Sadness     Dispar   2018/2019 was last " "urge   **National suicide hotline (Call or text them) and 911 - call  Step 1: Warning signs / cues (Thoughts, images, mood, situation, behavior) that a crisis may be developing:    Thoughts: \"People would be better off without me\", \"I'm a burden\", \"I can't do this anymore\" and \"Nothing makes it better\"    Images: flashbacks    Thinking Processes: ruminations (can't stop thinking about my problems): financial concerns and job concerns, racing thoughts, intrusive thoughts (bothersome, unwanted thoughts that come out of nowhere): thoughts of self harm and highly critical and negative thoughts: self judgemental thoughts and self distain    Mood: worsening depression, hopelessness, intense anger and disinhibited (not caring about things or consequences)    Behaviors: isolating/withdrawing , using drugs, using alcohol, aggression, not taking care of myself and not taking care of my responsibilities    Situations: relationship problems and financial stress   Step 2: Coping strategies - Things I can do to take my mind off of my problems without contacting another person (relaxation technique, physical activity):    Distress Tolerance Strategies:  relaxation activities: distraction activities in his home and sensory based activities/self-soothe with five senses: doing something that will taste sour and ground self using 5-4-3-2-1    Physical Activities: go for a walk and deep breathing    Focus on helpful thoughts:  \"This is temporary\", \"I will get through this\", \"Ride the wave\" and remind myself of what is important to me: family, job  Step 3: People and social settings that provide distraction:   Name: best friend Phone: pt has phone number in his phone    movie theater, coffee shop, park, library and gym    Step 4: Remind myself of people and things that are important to me and worth living for:  Family and my best friends.    Step 5: When I am in crisis, I can ask these people to help me use my safety plan:   Name: " father Phone: pt has phone number in phone  Step 6: Making the environment safe:     remove drugs, remove access to firearms: call father to take gun out of house and be around others  Step 7: Professionals or agencies I can contact during a crisis:    Suicide Prevention Lifeline: Call or Text 988   Local Crisis Services: 911, hospital    Call 911 or go to my nearest emergency department.   I helped develop this safety plan and agree to use it when needed.  I have been given a copy of this plan.      Client signature ________Godfrey Porras___________  Today s date:  6/20/2023  Completed by Provider Name/ Credentials:  Jeannette Gilliam, Psychotherapist Trainee, [facility]  June 20, 2023  Adapted from Safety Plan Template 2008 Candy Borges and Mart Yoder is reprinted with the express permission of the authors.  No portion of the Safety Plan Template may be reproduced without the express, written permission.  You can contact the authors at [email or web address] or [email or web address].

## 2023-07-12 NOTE — TELEPHONE ENCOUNTER
"Procedure: CIRCUMCISION    Date: 7/24/23  Location: [facility] OR  Provider: Dr. Kenyon     Post op appt: Not needed    H&P: Pt reminded to do  UA/UC: not needed  Bowel Prep: NA    Medications: to be reviewed at pre-op   Blood thinners: No   Diabetic medications: No   Does pt take: No                          Phentermine                          Ozempic                          Wegovy (Semaglutide)    If yes, \"Hold for 7 days before procedure.  Please consult your prescribing provider if you have questions about holding this medication.\"  Soap: reviewed  Reviewed when to start clear liquids and when to start NPO: reviewed  : carol Pinto  24 hour observation: Friend Millie    Pt or family member expressed understanding: Godfrey verbalized understanding of surgical prep information.    Tomasa Fried RN  7/12/2023  8:07 AM       "

## 2023-07-18 ENCOUNTER — TELEPHONE (OUTPATIENT)
Dept: UROLOGY | Facility: CLINIC | Age: 33
End: 2023-07-18
Payer: COMMERCIAL

## 2023-07-18 ENCOUNTER — OFFICE VISIT (OUTPATIENT)
Dept: PSYCHOLOGY | Facility: CLINIC | Age: 33
End: 2023-07-18
Payer: COMMERCIAL

## 2023-07-18 DIAGNOSIS — F33.1 MAJOR DEPRESSIVE DISORDER, RECURRENT EPISODE, MODERATE WITH ANXIOUS DISTRESS (H): Primary | ICD-10-CM

## 2023-07-18 PROCEDURE — 90834 PSYTX W PT 45 MINUTES: CPT

## 2023-07-18 NOTE — TELEPHONE ENCOUNTER
Called patient to assist in getting his pre-op done. Patient wants to cancel for now due anxiety. Patient requesting appointment with Dr Kenyon to discuss procedure more.  Sent to  to cancel procedure and patient will call back to reschedule at some point.    Suma Carlisle, RN, BSN  Care Coordinator Urology  [facility]  Urology [facility]  [phone]

## 2023-07-18 NOTE — PROGRESS NOTES
"[facility]                                     Progress Note    Patient Name: Godfrey Porras  Date: 7/18/23         Service Type: Individual      Session Start Time: 10:05am Session End Time: 10:55am     Session Length: 50 minutes    Session #: 7    Attendees: Client attended alone    Service Modality:  In person    DATA  Extended Session (53+ minutes): No  Interactive Complexity: No  Crisis: No        Progress Since Last Session (Related to Symptoms / Goals / Homework):   Symptoms: Improving depressive symptoms    Homework: Achieved / completed to satisfaction      Episode of Care Goals: Satisfactory progress - ACTION (Actively working towards change); Intervened by reinforcing change plan / affirming steps taken     Current / Ongoing Stressors and Concerns:   Pt reports main stress to be figuring out his current financial situation. Pt reports mood since last session has \"been better than past weeks\" stating he has been completing tasks on his to-do list and appears to be more motivated to begin working on his goals again. Pt reports being able to book three jobs since last appointment and applied to at least 20 jobs. Pt and provider discussed the growth of his internal motivation as well as the shift in his thinking from past to future oriented. Pt appears to have gained insight into his thinking patterns and what he needs to do to reach his goals. Pt realized he has been struggling with emotional vulnerability because he has \"no real emotions to communicate\" with others due to the feelings of numbness associated with depression. Pt appears to be working toward gaining insight into the feels that he is blocking out and ways he can work to take care of himself. Pt denies any active SI, SIB, or HI since last session. Pt endorses ongoing passive SI that he contributes to intrusive thoughts that he has when he gets angry at himself. Pt contracted for safety and feels that he is able to keep " himself safe. Pt will use safety plan should a crisis arise. See below for plan of care until next appointment     Treatment Objective(s) Addressed in This Session:   identify at least 3 emotional triggers for anxiety  Increase interest, engagement, and pleasure in doing things  Feel less tired and more energy during the day   Identify negative self-talk and behaviors: challenge core beliefs, myths, and actions  Improve concentration, focus, and mindfulness in daily activities   Decrease thoughts that you'd be better off dead or of suicide / self-harm     Intervention:   CBT: emotion identification as it related to cognitive-behavioral reactions, feeling as anxiety/depression trigger, insight made into behaviora processes at this time  Motivational Interviewing: growth of internal motivation, affirmation, reframe, OARS    Assessments completed prior to visit:  The following assessments were completed by patient for this visit:  PROMIS 10-Global Health (all questions and answers displayed):       3/29/2022     9:02 AM 8/8/2022     9:50 AM 5/31/2023    10:16 AM 6/7/2023     1:18 PM   PROMIS 10   In general, would you say your health is: Good Fair  Fair   In general, would you say your quality of life is: Good Good  Good   In general, how would you rate your physical health? Fair Fair  Good   In general, how would you rate your mental health, including your mood and your ability to think? Fair Fair  Poor   In general, how would you rate your satisfaction with your social activities and relationships? Fair Poor  Poor   In general, please rate how well you carry out your usual social activities and roles Poor Good  Poor   To what extent are you able to carry out your everyday physical activities such as walking, climbing stairs, carrying groceries, or moving a chair? Completely Completely  Mostly   In the past 7 days, how often have you been bothered by emotional problems such as feeling anxious, depressed, or irritable?  Often Sometimes  Often   In the past 7 days, how would you rate your fatigue on average? Moderate Moderate  Severe   In the past 7 days, how would you rate your pain on average, where 0 means no pain, and 10 means worst imaginable pain? 2 3  3   In general, would you say your health is: 3 2 3 2   In general, would you say your quality of life is: 3 3 3 3   In general, how would you rate your physical health? 2 2 3 3   In general, how would you rate your mental health, including your mood and your ability to think? 2 2 1 1   In general, how would you rate your satisfaction with your social activities and relationships? 2 1 2 1   In general, please rate how well you carry out your usual social activities and roles. (This includes activities at home, at work and in your community, and responsibilities as a parent, child, spouse, employee, friend, etc.) 1 3 2 1   To what extent are you able to carry out your everyday physical activities such as walking, climbing stairs, carrying groceries, or moving a chair? 5 5 4 4   In the past 7 days, how often have you been bothered by emotional problems such as feeling anxious, depressed, or irritable? 4 3 5 4   In the past 7 days, how would you rate your fatigue on average? 3 3 5 4   In the past 7 days, how would you rate your pain on average, where 0 means no pain, and 10 means worst imaginable pain? 2 3 3 3   Global Mental Health Score 9 9 7 7   Global Physical Health Score 14 14 12 13   PROMIS TOTAL - SUBSCORES 23 23 19 20         ASSESSMENT: Current Emotional / Mental Status (status of significant symptoms):   Risk status (Self / Other harm or suicidal ideation)   Patient denies current fears or concerns for personal safety.   Patient reports the following current or recent suicidal ideation or behaviors: pt endorses passive SI and denies having any intention or plan of harming himself during appointment on 7/18/23. Pt agrees to use resources and follow safety plan if  needed.   Patient denies current or recent homicidal ideation or behaviors.   Patient denies current or recent self injurious behavior or ideation.   Patient denies other safety concerns.   Patient reports there has been no change in risk factors since their last session.     Patient reports there has been no change in protective factors since their last session.     A safety and risk management plan has been developed including: Patient consented to co-developed safety plan on 6/20/23.  Safety and risk management plan was reviewed.   Patient agreed to use safety plan should any safety concerns arise.  A copy was made available to the patient.     Appearance:   Appropriate    Eye Contact:   Good    Psychomotor Behavior: Normal    Attitude:   Cooperative    Orientation:   All   Speech    Rate / Production: Normal     Volume:  Normal    Mood:    Anxious  Depressed    Affect:    Appropriate    Thought Content:  Clear    Thought Form:  Coherent  Logical    Insight:    Good      Medication Review:   No changes to current psychiatric medication(s)     Medication Compliance:   Yes     Changes in Health Issues:   None reported     Chemical Use Review:   Substance Use: Chemical use reviewed, no active concerns identified . Pt feels in control of his substance use and does not feel that it is an issue to discuss at this time.   Tobacco Use: No current tobacco use.      Diagnosis:  Major Depressive Disorder, Recurrent Episode, Moderate with anxious distress    Collateral Reports Completed:   Not Applicable    PLAN: (Patient Tasks / Therapist Tasks / Other)  1. Pt is to write down any thoughts or feelings that come up over the next week related to the relationship he had with his ex girlfriend and bring to appointment to discuss.  2. Pt is to work on establishing a self care routine by making himself one home-cooked meal that he enjoys eating over the next week.         Jeannette Gilliam, Psychotherapist Trainee,  [facility]  7/18/23    This note has been reviewed and I agree with the plan of care. This note is co-signed by Valentine King MA, [facility] Supervisor, on: 7/19/2023                                                         ______________________________________________________________________    Individual Treatment Plan    Patient's Name: Godfrey Porras  YOB: 1990    Date of Creation: 6/13/23  Date Treatment Plan Last Reviewed/Revised: 6/13/23    DSM5 Diagnoses: 296.32 (F33.1) Major Depressive Disorder, Recurrent Episode, Moderate With anxious distress  Psychosocial / Contextual Factors: loss of important relationship, stress at work and stress within important roles, death of close friends  PROMIS (reviewed every 90 days): 20    Referral / Collaboration:  Referral to another professional/service is not indicated at this time..    Anticipated number of session for this episode of care: 9-12 sessions  Anticipation frequency of session: Weekly  Anticipated Duration of each session: 38-52 minutes  Treatment plan will be reviewed in 90 days or when goals have been changed.       MeasurableTreatment Goal(s) related to diagnosis / functional impairment(s)  Goal 1: Patient will decrease his depressive symptoms by 50%.    I will know I've met my goal when I can do things I enjoy doing again.      Objective #A    Patient will Decrease frequency and intensity of feeling down, depressed, hopeless.  Status: New - Date: 6/13/23     Intervention(s)  Therapist will teach emotion regulation skills and coping skills for his depressive symptoms. Pt will be assigned homework related to practicing skills outside of therapy.    Objective #B  Patient will Identify negative self-talk and behaviors: challenge core beliefs, myths, and actions.   Status: New - Date: 6/13/23     Intervention(s)  Therapist will teach about thinking patterns and core beliefs/automatic thoughts. Pt will be assigned homework related to recognizing  "emotions and thinking patterns within himself to discuss during session.    Objective #C  Patient will Decrease thoughts that you'd be better off dead or of suicide / self-harm.   Status: New - Date: 6/13/23     Intervention(s)  Therapist will teach emotion regulation skills, distress tolerance skills, and thought modification to replace his maladaptive automatic thoughts. Pt will be assigned homework of thought logs and practicing skills outside of therapy.    Objective #D   Patient will use cognitive strategies identified in therapy to challenge anxious thoughts    Status: New - Date: 6/13/23     Intervention(s)  Therapist will assign homework related to thought challenging outside of the therapy room using emotion regulation skills learned.    Objective #E  Patient will identify 5 values that are important to him and the way he lives his life. Pt will identify ways he can live with those values in mind.    Status: New - Date: 6/13/23     Intervention(s)  Therapist will introduce ACT and value identification to pt. Pt will be assigned homework related to behavioral activation.    Objective #F  Patient will talk to at least two others about losses and coping.   Status: New - Date: 6/13/23     Intervention(s)  Therapist will assign homework related to pt reaching out and building stronger social support in his life..      Patient has reviewed and agreed to the above plan.      Jeannette Gilliam, Psychotherapist Trainee, [facility]    June 13, 2023  __________________________________________________________________________________      [facility]                                       Godfrey Porras     SAFETY PLAN:   \"I am going to shoot myself\"   Emotions    Anger toward himself    Frustration   Come up when he thinks about    People     Sj     Millie and other people he cares about are to a lesser degree   \"thought spirals\"    Something I say or do reminds me of sj    I wish I would " "have....    Then it becomes and overwhelming feeling of anger and frustration    \"emotion onion\"     Sadness     Dispar   2018/2019 was last urge   **National suicide hotline (Call or text them) and 911 - call  Step 1: Warning signs / cues (Thoughts, images, mood, situation, behavior) that a crisis may be developing:    Thoughts: \"People would be better off without me\", \"I'm a burden\", \"I can't do this anymore\" and \"Nothing makes it better\"    Images: flashbacks    Thinking Processes: ruminations (can't stop thinking about my problems): financial concerns and job concerns, racing thoughts, intrusive thoughts (bothersome, unwanted thoughts that come out of nowhere): thoughts of self harm and highly critical and negative thoughts: self judgemental thoughts and self distain    Mood: worsening depression, hopelessness, intense anger and disinhibited (not caring about things or consequences)    Behaviors: isolating/withdrawing , using drugs, using alcohol, aggression, not taking care of myself and not taking care of my responsibilities    Situations: relationship problems and financial stress   Step 2: Coping strategies - Things I can do to take my mind off of my problems without contacting another person (relaxation technique, physical activity):    Distress Tolerance Strategies:  relaxation activities: distraction activities in his home and sensory based activities/self-soothe with five senses: doing something that will taste sour and ground self using 5-4-3-2-1    Physical Activities: go for a walk and deep breathing    Focus on helpful thoughts:  \"This is temporary\", \"I will get through this\", \"Ride the wave\" and remind myself of what is important to me: family, job  Step 3: People and social settings that provide distraction:   Name: best friend Phone: pt has phone number in his phone    movie theater, coffee shop, park, library and gym    Step 4: Remind myself of people and things that are important to me and worth " living for:  Family and my best friends.    Step 5: When I am in crisis, I can ask these people to help me use my safety plan:   Name: father Phone: pt has phone number in phone  Step 6: Making the environment safe:     remove drugs, remove access to firearms: call father to take gun out of house and be around others  Step 7: Professionals or agencies I can contact during a crisis:    Suicide Prevention Lifeline: Call or Text 988   Local Crisis Services: 911, hospital    Call 911 or go to my nearest emergency department.   I helped develop this safety plan and agree to use it when needed.  I have been given a copy of this plan.      Client signature ________Jacob Dasilveira___________  Today s date:  6/20/2023  Completed by Provider Name/ Credentials:  Jeannette Gilliam, Psychotherapist Trainee, [facility]  June 20, 2023  Adapted from Safety Plan Template 2008 Candy Borges and Mart Yoder is reprinted with the express permission of the authors.  No portion of the Safety Plan Template may be reproduced without the express, written permission.  You can contact the authors at [email or web address] or [email or web address].

## 2023-07-19 ENCOUNTER — DOCUMENTATION ONLY (OUTPATIENT)
Dept: PSYCHOLOGY | Facility: CLINIC | Age: 33
End: 2023-07-19
Payer: COMMERCIAL

## 2023-07-19 DIAGNOSIS — F41.1 GAD (GENERALIZED ANXIETY DISORDER): ICD-10-CM

## 2023-07-19 DIAGNOSIS — J45.40 MODERATE PERSISTENT ASTHMA WITHOUT COMPLICATION: ICD-10-CM

## 2023-07-19 DIAGNOSIS — F33.1 MAJOR DEPRESSIVE DISORDER, RECURRENT EPISODE, MODERATE WITH ANXIOUS DISTRESS (H): Primary | ICD-10-CM

## 2023-07-19 PROCEDURE — 99207 PR NO BILLABLE SERVICE THIS VISIT: CPT

## 2023-07-19 RX ORDER — FLUTICASONE PROPIONATE AND SALMETEROL XINAFOATE 115; 21 UG/1; UG/1
2 AEROSOL, METERED RESPIRATORY (INHALATION) 2 TIMES DAILY
Qty: 36 G | Refills: 11 | Status: SHIPPED | OUTPATIENT
Start: 2023-07-19 | End: 2024-07-26

## 2023-07-19 NOTE — TELEPHONE ENCOUNTER
Patient is calling to request a refill on his Advair inhaler. Pharmacy desired attached. Please fill as able.

## 2023-07-19 NOTE — PROGRESS NOTES
Case Consultation Record       Client Name: Godfrey Porras   Date:  7/19/23    Diagnosis: Major depressive disorder, recurrent episode, moderate with anxious distress    Generalized Anxiety disorder    Therapist: Jeannette Gilliam , Psychotherapist Trainee, [facility]      Team Members Present:  Therapist and Valentine King [facility]    Purpose:   General Review of Treatment    Recommendations:  It is recommended for provider to continue modality and methods of therapy currently being provided as well as continue to assess for risk to self during appointments.      Jeannette Gilliam , Psychotherapist Trainee, [facility]  July 19, 2023

## 2023-07-25 ENCOUNTER — OFFICE VISIT (OUTPATIENT)
Dept: PSYCHOLOGY | Facility: CLINIC | Age: 33
End: 2023-07-25
Payer: COMMERCIAL

## 2023-07-25 DIAGNOSIS — F33.1 MAJOR DEPRESSIVE DISORDER, RECURRENT EPISODE, MODERATE WITH ANXIOUS DISTRESS (H): Primary | ICD-10-CM

## 2023-07-25 PROCEDURE — 90834 PSYTX W PT 45 MINUTES: CPT

## 2023-07-25 NOTE — PROGRESS NOTES
"[facility]                                     Progress Note    Patient Name: Godfrey Porras  Date: 7/25/23         Service Type: Individual      Session Start Time: 10:05am Session End Time: 10:50am     Session Length: 45 minutes    Session #: 8    Attendees: Client attended alone    Service Modality:  In person    DATA  Extended Session (53+ minutes): No  Interactive Complexity: No  Crisis: No        Progress Since Last Session (Related to Symptoms / Goals / Homework):   Symptoms: No change in depressive symptoms since last appointment    Homework: Achieved / completed to satisfaction      Episode of Care Goals: Satisfactory progress - ACTION (Actively working towards change); Intervened by reinforcing change plan / affirming steps taken     Current / Ongoing Stressors and Concerns:   Pt reports ongoing stressors as 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 Pt reports mood since last session was \"the same\" as previous appointments stating there was not a change in his depressive symptoms. Pt brought up relationships with significant others as an ongoing stressor in his life and for his future. Pt and provider discussed what relationships have been like for him in the past and dug into where he got his relationship norms from while he was growing up. Pt shared many of his relationships have been chaotic and shared what his role in those relationships have been. Pt appears to have gained insight into how he has contributed to the chaos he has experienced as well as gained insight into the important things within a romantic relationship he is looking for. Pt appears to be working toward identifying how to manage different types of feelings within relationship to others and what he would like to gain out of future relationships. Pt endorses \"the same [passive SI] thoughts as last week\" and has been working to identify the triggers for the passive SI. Pt " contracts for safety during appointment and agreed to follow safety plan. See below for plan of care until next appointment.       Treatment Objective(s) Addressed in This Session:   use thought-stopping strategy daily to reduce intrusive thoughts  Increase interest, engagement, and pleasure in doing things  Feel less tired and more energy during the day   Identify negative self-talk and behaviors: challenge core beliefs, myths, and actions  Improve concentration, focus, and mindfulness in daily activities   Decrease thoughts that you'd be better off dead or of suicide / self-harm     Intervention:   CBT: thinking pattern identification, discussion of behavioral activation related to depressive thinking patterns, discussed cognitive distortions and challenging thinking patterns  Motivational Interviewing: miracle question, discussion of how things have been in the past when they have been what pt enjoyed, growing internal motivation for change, OARS  Psychodynamic: discussion of how early relationship models and interventions contributes to current view/interactions in relationships    Assessments completed prior to visit:  The following assessments were completed by patient for this visit:  PROMIS 10-Global Health (all questions and answers displayed):       3/29/2022     9:02 AM 8/8/2022     9:50 AM 5/31/2023    10:16 AM 6/7/2023     1:18 PM   PROMIS 10   In general, would you say your health is: Good Fair  Fair   In general, would you say your quality of life is: Good Good  Good   In general, how would you rate your physical health? Fair Fair  Good   In general, how would you rate your mental health, including your mood and your ability to think? Fair Fair  Poor   In general, how would you rate your satisfaction with your social activities and relationships? Fair Poor  Poor   In general, please rate how well you carry out your usual social activities and roles Poor Good  Poor   To what extent are you able to  carry out your everyday physical activities such as walking, climbing stairs, carrying groceries, or moving a chair? Completely Completely  Mostly   In the past 7 days, how often have you been bothered by emotional problems such as feeling anxious, depressed, or irritable? Often Sometimes  Often   In the past 7 days, how would you rate your fatigue on average? Moderate Moderate  Severe   In the past 7 days, how would you rate your pain on average, where 0 means no pain, and 10 means worst imaginable pain? 2 3  3   In general, would you say your health is: 3 2 3 2   In general, would you say your quality of life is: 3 3 3 3   In general, how would you rate your physical health? 2 2 3 3   In general, how would you rate your mental health, including your mood and your ability to think? 2 2 1 1   In general, how would you rate your satisfaction with your social activities and relationships? 2 1 2 1   In general, please rate how well you carry out your usual social activities and roles. (This includes activities at home, at work and in your community, and responsibilities as a parent, child, spouse, employee, friend, etc.) 1 3 2 1   To what extent are you able to carry out your everyday physical activities such as walking, climbing stairs, carrying groceries, or moving a chair? 5 5 4 4   In the past 7 days, how often have you been bothered by emotional problems such as feeling anxious, depressed, or irritable? 4 3 5 4   In the past 7 days, how would you rate your fatigue on average? 3 3 5 4   In the past 7 days, how would you rate your pain on average, where 0 means no pain, and 10 means worst imaginable pain? 2 3 3 3   Global Mental Health Score 9 9 7 7   Global Physical Health Score 14 14 12 13   PROMIS TOTAL - SUBSCORES 23 23 19 20         ASSESSMENT: Current Emotional / Mental Status (status of significant symptoms):   Risk status (Self / Other harm or suicidal ideation)   Patient denies current fears or concerns for  personal safety.   Patient reports the following current or recent suicidal ideation or behaviors: pt endorses passive SI and denies having any intention or plan of harming himself during appointment on 7/25/23. Pt agrees to use resources and follow safety plan if needed.   Patient denies current or recent homicidal ideation or behaviors.   Patient denies current or recent self injurious behavior or ideation.   Patient denies other safety concerns.   Patient reports there has been no change in risk factors since their last session.     Patient reports there has been no change in protective factors since their last session.     A safety and risk management plan has been developed including: Patient consented to co-developed safety plan on 6/20/23.  Safety and risk management plan was reviewed.   Patient agreed to use safety plan should any safety concerns arise.  A copy was made available to the patient.     Appearance:   Appropriate    Eye Contact:   Good    Psychomotor Behavior: Normal    Attitude:   Cooperative    Orientation:   All   Speech    Rate / Production: Normal     Volume:  Normal    Mood:    Anxious  Depressed    Affect:    Appropriate    Thought Content:  Clear    Thought Form:  Coherent  Logical    Insight:    Good      Medication Review:   No changes to current psychiatric medication(s)     Medication Compliance:   Yes     Changes in Health Issues:   None reported     Chemical Use Review:   Substance Use: Chemical use reviewed, no active concerns identified .   Tobacco Use: No current tobacco use.      Diagnosis:  Major Depressive Disorder, Recurrent Episode, Moderate with anxious distress    Collateral Reports Completed:   Not Applicable    PLAN: (Patient Tasks / Therapist Tasks / Other)  Pt is to thinking about how he grew up viewing relationships (friendships, family, romantic) impacted his current views of relationships.  Pt is to get out into nature for at least two walks over the next week.  Pt  is to follow safety plan should any active SI thoughts arise before next appointment.        Jeannette Gilliam, Psychotherapist Trainee, [facility]  7/25/23    This note has been reviewed and I agree with the plan of care. This note is co-signed by Valentine King MA, [facility] Supervisor, on: 7/25/2023                                                         ______________________________________________________________________    Individual Treatment Plan    Patient's Name: Godfrey Porras  YOB: 1990    Date of Creation: 6/13/23  Date Treatment Plan Last Reviewed/Revised: 6/13/23    DSM5 Diagnoses: 296.32 (F33.1) Major Depressive Disorder, Recurrent Episode, Moderate With anxious distress  Psychosocial / Contextual Factors: loss of important relationship, stress at work and stress within important roles, death of close friends  PROMIS (reviewed every 90 days): 20    Referral / Collaboration:  Referral to another professional/service is not indicated at this time..    Anticipated number of session for this episode of care: 9-12 sessions  Anticipation frequency of session: Weekly  Anticipated Duration of each session: 38-52 minutes  Treatment plan will be reviewed in 90 days or when goals have been changed.       MeasurableTreatment Goal(s) related to diagnosis / functional impairment(s)  Goal 1: Patient will decrease his depressive symptoms by 50%.    I will know I've met my goal when I can do things I enjoy doing again.      Objective #A    Patient will Decrease frequency and intensity of feeling down, depressed, hopeless.  Status: New - Date: 6/13/23      Intervention(s)  Therapist will teach emotion regulation skills and coping skills for his depressive symptoms. Pt will be assigned homework related to practicing skills outside of therapy.    Objective #B  Patient will Identify negative self-talk and behaviors: challenge core beliefs, myths, and actions.   Status: New - Date: 6/13/23  "    Intervention(s)  Therapist will teach about thinking patterns and core beliefs/automatic thoughts. Pt will be assigned homework related to recognizing emotions and thinking patterns within himself to discuss during session .    Objective #C  Patient will Decrease thoughts that you'd be better off dead or of suicide / self-harm.   Status: New - Date: 6/13/23     Intervention(s)  Therapist will teach emotion regulation skills, distress tolerance skills, and thought modification to replace his maladaptive automatic thoughts. Pt will be assigned homework of thought logs and practicing skills outside of therapy .    Objective #D   Patient will use cognitive strategies identified in therapy to challenge anxious thoughts    Status: New - Date: 6/13/23     Intervention(s)  Therapist will assign homework related to thought challenging outside of the therapy room using emotion regulation skills learned .    Objective #E  Patient will identify 5 values that are important to him and the way he lives his life. Pt will identify ways he can live with those values in mind.    Status: New - Date: 6/13/23     Intervention(s)  Therapist will introduce ACT and value identification to pt. Pt will be assigned homework related to behavioral activation.    Objective #F  Patient will talk to at least two others about losses and coping.   Status: New - Date: 6/13/23     Intervention(s)  Therapist will assign homework related to pt reaching out and building stronger social support in his life. .      Patient has reviewed and agreed to the above plan.      Jeannette Gilliam, Psychotherapist Trainee, [facility]    June 13, 2023  __________________________________________________________________________________       [facility]                                       Godfrey Porras     SAFETY PLAN:   \"I am going to shoot myself\"   Emotions    Anger toward himself    Frustration   Come up when he thinks " "about    People     Sj Pinto and other people he cares about are to a lesser degree   \"thought spirals\"    Something I say or do reminds me of sj    I wish I would have....    Then it becomes and overwhelming feeling of anger and frustration    \"emotion onion\"     Sadness     Dispar   2018/2019 was last urge   **National suicide hotline (Call or text them) and 911 - call  Step 1: Warning signs / cues (Thoughts, images, mood, situation, behavior) that a crisis may be developing:  Thoughts: \"People would be better off without me\", \"I'm a burden\", \"I can't do this anymore\" and \"Nothing makes it better\"  Images: flashbacks  Thinking Processes: ruminations (can't stop thinking about my problems): financial concerns and job concerns, racing thoughts, intrusive thoughts (bothersome, unwanted thoughts that come out of nowhere): thoughts of self harm and highly critical and negative thoughts: self judgemental thoughts and self distain  Mood: worsening depression, hopelessness, intense anger and disinhibited (not caring about things or consequences)  Behaviors: isolating/withdrawing , using drugs, using alcohol, aggression, not taking care of myself and not taking care of my responsibilities  Situations: relationship problems and financial stress   Step 2: Coping strategies - Things I can do to take my mind off of my problems without contacting another person (relaxation technique, physical activity):  Distress Tolerance Strategies:  relaxation activities: distraction activities in his home and sensory based activities/self-soothe with five senses: doing something that will taste sour and ground self using 5-4-3-2-1  Physical Activities: go for a walk and deep breathing  Focus on helpful thoughts:  \"This is temporary\", \"I will get through this\", \"Ride the wave\" and remind myself of what is important to me: family, job  Step 3: People and social settings that provide distraction:   Name: best friend Phone: pt has " phone number in his phone  movie theater, coffee shop, park, library and gym    Step 4: Remind myself of people and things that are important to me and worth living for:  Family and my best friends.    Step 5: When I am in crisis, I can ask these people to help me use my safety plan:   Name: father Phone: pt has phone number in phone  Step 6: Making the environment safe:   remove drugs, remove access to firearms: call father to take gun out of house and be around others  Step 7: Professionals or agencies I can contact during a crisis:  Suicide Prevention Lifeline: Call or Text 988   Local Crisis Services: 911, hospital    Call 911 or go to my nearest emergency department.   I helped develop this safety plan and agree to use it when needed.  I have been given a copy of this plan.      Client signature ________Godfrey Chiquita___________  Today s date:  6/20/2023  Completed by Provider Name/ Credentials:  Jeannette Gilliam, Psychotherapist Trainee, [facility]  June 20, 2023  Adapted from Safety Plan Template 2008 Candy Borges and Mart Yoder is reprinted with the express permission of the authors.  No portion of the Safety Plan Template may be reproduced without the express, written permission.  You can contact the authors at [email or web address] or [email or web address].

## 2023-08-08 ENCOUNTER — OFFICE VISIT (OUTPATIENT)
Dept: PSYCHOLOGY | Facility: CLINIC | Age: 33
End: 2023-08-08
Payer: COMMERCIAL

## 2023-08-08 DIAGNOSIS — F33.1 MAJOR DEPRESSIVE DISORDER, RECURRENT EPISODE, MODERATE WITH ANXIOUS DISTRESS (H): Primary | ICD-10-CM

## 2023-08-08 PROCEDURE — 90834 PSYTX W PT 45 MINUTES: CPT

## 2023-08-08 NOTE — PROGRESS NOTES
"[facility]                                     Progress Note    Patient Name: Godfrey Porras  Date: 8/8/23         Service Type: Individual      Session Start Time: 10:05am Session End Time: 10:51am     Session Length: 46 minutes    Session #: 9    Attendees: Client attended alone    Service Modality:  In person    DATA  Extended Session (53+ minutes): No  Interactive Complexity: No  Crisis: No        Progress Since Last Session (Related to Symptoms / Goals / Homework):   Symptoms: Improving depressive symptoms    Homework: Achieved / completed to satisfaction      Episode of Care Goals: Satisfactory progress - ACTION (Actively working towards change); Intervened by reinforcing change plan / affirming steps taken     Current / Ongoing Stressors and Concerns:   Pt reports ongoing stressors as financial and working on managing important relationships in his life. Pt reports mood since last session  has been \"a bit better\" reporting a decrease in depressive symptoms and an increase in motivation to work more as well as complete tasks that need to get done. Pt and provider discussed how self-medication with substances can affect his mental health and explored feelings that are present for him before, during, and after use. Pt appears to have gained insight into how his use is impacting his mental health and where he is in the stages of change. Pt appears to be working toward developing internal validation and motivation for sustained change. Pt denies any active SI, SIB, or HI since last session. Pt reports passive SI that \"is the same\" since last session. Pt denies having a plan or intent to harm himself. Pt contracted for safety. See below for plan of care until next appointment.     Treatment Objective(s) Addressed in This Session:   identify at least 5 triggers for anxiety  Increase interest, engagement, and pleasure in doing things  Feel less tired and more energy during the day   Identify " negative self-talk and behaviors: challenge core beliefs, myths, and actions  Improve concentration, focus, and mindfulness in daily activities   Decrease thoughts that you'd be better off dead or of suicide / self-harm     Intervention:   CBT: thinking pattern identification, discussion of behavioral activation related to depressive thinking patterns, discussed cognitive distortions and challenging thinking patterns  Motivational Interviewing: evoking change talk, growing internal motivation for change, OARS    Assessments completed prior to visit:  The following assessments were completed by patient for this visit:  PROMIS 10-Global Health (all questions and answers displayed):       3/29/2022     9:02 AM 8/8/2022     9:50 AM 5/31/2023    10:16 AM 6/7/2023     1:18 PM   PROMIS 10   In general, would you say your health is: Good Fair  Fair   In general, would you say your quality of life is: Good Good  Good   In general, how would you rate your physical health? Fair Fair  Good   In general, how would you rate your mental health, including your mood and your ability to think? Fair Fair  Poor   In general, how would you rate your satisfaction with your social activities and relationships? Fair Poor  Poor   In general, please rate how well you carry out your usual social activities and roles Poor Good  Poor   To what extent are you able to carry out your everyday physical activities such as walking, climbing stairs, carrying groceries, or moving a chair? Completely Completely  Mostly   In the past 7 days, how often have you been bothered by emotional problems such as feeling anxious, depressed, or irritable? Often Sometimes  Often   In the past 7 days, how would you rate your fatigue on average? Moderate Moderate  Severe   In the past 7 days, how would you rate your pain on average, where 0 means no pain, and 10 means worst imaginable pain? 2 3  3   In general, would you say your health is: 3 2 3 2   In general, would  you say your quality of life is: 3 3 3 3   In general, how would you rate your physical health? 2 2 3 3   In general, how would you rate your mental health, including your mood and your ability to think? 2 2 1 1   In general, how would you rate your satisfaction with your social activities and relationships? 2 1 2 1   In general, please rate how well you carry out your usual social activities and roles. (This includes activities at home, at work and in your community, and responsibilities as a parent, child, spouse, employee, friend, etc.) 1 3 2 1   To what extent are you able to carry out your everyday physical activities such as walking, climbing stairs, carrying groceries, or moving a chair? 5 5 4 4   In the past 7 days, how often have you been bothered by emotional problems such as feeling anxious, depressed, or irritable? 4 3 5 4   In the past 7 days, how would you rate your fatigue on average? 3 3 5 4   In the past 7 days, how would you rate your pain on average, where 0 means no pain, and 10 means worst imaginable pain? 2 3 3 3   Global Mental Health Score 9 9 7 7   Global Physical Health Score 14 14 12 13   PROMIS TOTAL - SUBSCORES 23 23 19 20         ASSESSMENT: Current Emotional / Mental Status (status of significant symptoms):   Risk status (Self / Other harm or suicidal ideation)   Patient denies current fears or concerns for personal safety.   Patient reports the following current or recent suicidal ideation or behaviors: pt endorses passive SI and denies having any intention or plan of harming himself during appointment on 8/8/23. Pt agrees to use resources and follow safety plan if needed.   Patient denies current or recent homicidal ideation or behaviors.   Patient denies current or recent self injurious behavior or ideation.   Patient denies other safety concerns.   Patient reports there has been no change in risk factors since their last session.     Patient reports there has been no change in  "protective factors since their last session.     A safety and risk management plan has been developed including: Patient consented to co-developed safety plan on 6/20/23.  Safety and risk management plan was reviewed.   Patient agreed to use safety plan should any safety concerns arise.  A copy was made available to the patient.     Appearance:   Appropriate    Eye Contact:   Good    Psychomotor Behavior: Normal    Attitude:   Cooperative    Orientation:   All   Speech    Rate / Production: Normal     Volume:  Normal    Mood:    Anxious  Depressed    Affect:    Appropriate    Thought Content:  Clear    Thought Form:  Coherent  Logical    Insight:    Good      Medication Review:   No changes to current psychiatric medication(s)     Medication Compliance:   Yes     Changes in Health Issues:   None reported     Chemical Use Review:   Substance Use: increase in alcohol , cannabis , and cocaine .  Patient reports frequency of use multiple times per day for THC, normally using at least two drinks of alcohol per day in the evenings, and a \"couple bumps\" of cocaine per month.  Stage of Change: Contemplation  Patient assessed present costs and future losses as a result of substance use  Reviewed concerns related to health related substance abuse risk  Provided support and affirmation for steps taken towards sobriety    .   Tobacco Use: No current tobacco use.      Diagnosis:  Major Depressive Disorder, Recurrent Episode, Moderate with anxious distress    Collateral Reports Completed:   Not Applicable    PLAN: (Patient Tasks / Therapist Tasks / Other)  Pt is to continue completing tasks and working the way he has been over the last two weeks and continue to grow internal motivation to complete tasks.  Pt is to make a list of at least 5 things he would be interested in trying or having as a hobby and bring to next appointment.   Pt is to use and follow safety plan, if needed, before next appointment.        Jeannette Gilliam, " Psychotherapist Trainee, [facility]  8/8/23    This note has been reviewed and I agree with the plan of care. This note is co-signed by Valentine King MA, [facility] Supervisor, on: 8/9/2023                                                         ______________________________________________________________________    Individual Treatment Plan    Patient's Name: Godfrey Porras  YOB: 1990    Date of Creation: 6/13/23  Date Treatment Plan Last Reviewed/Revised: 6/13/23    DSM5 Diagnoses: 296.32 (F33.1) Major Depressive Disorder, Recurrent Episode, Moderate With anxious distress  Psychosocial / Contextual Factors: loss of important relationship, stress at work and stress within important roles, death of close friends  PROMIS (reviewed every 90 days): 20    Referral / Collaboration:  Referral to another professional/service is not indicated at this time..    Anticipated number of session for this episode of care: 9-12 sessions  Anticipation frequency of session: Weekly  Anticipated Duration of each session: 38-52 minutes  Treatment plan will be reviewed in 90 days or when goals have been changed.       MeasurableTreatment Goal(s) related to diagnosis / functional impairment(s)  Goal 1: Patient will decrease his depressive symptoms by 50%.    I will know I've met my goal when I can do things I enjoy doing again.      Objective #A    Patient will Decrease frequency and intensity of feeling down, depressed, hopeless.  Status: New - Date: 6/13/23      Intervention(s)  Therapist will teach emotion regulation skills and coping skills for his depressive symptoms. Pt will be assigned homework related to practicing skills outside of therapy.    Objective #B  Patient will Identify negative self-talk and behaviors: challenge core beliefs, myths, and actions.   Status: New - Date: 6/13/23     Intervention(s)  Therapist will teach about thinking patterns and core beliefs/automatic thoughts. Pt will be assigned homework  "related to recognizing emotions and thinking patterns within himself to discuss during session .    Objective #C  Patient will Decrease thoughts that you'd be better off dead or of suicide / self-harm.   Status: New - Date: 6/13/23     Intervention(s)  Therapist will teach emotion regulation skills, distress tolerance skills, and thought modification to replace his maladaptive automatic thoughts. Pt will be assigned homework of thought logs and practicing skills outside of therapy .    Objective #D   Patient will use cognitive strategies identified in therapy to challenge anxious thoughts    Status: New - Date: 6/13/23     Intervention(s)  Therapist will assign homework related to thought challenging outside of the therapy room using emotion regulation skills learned .    Objective #E  Patient will identify 5 values that are important to him and the way he lives his life. Pt will identify ways he can live with those values in mind.    Status: New - Date: 6/13/23     Intervention(s)  Therapist will introduce ACT and value identification to pt. Pt will be assigned homework related to behavioral activation.    Objective #F  Patient will talk to at least two others about losses and coping.   Status: New - Date: 6/13/23     Intervention(s)  Therapist will assign homework related to pt reaching out and building stronger social support in his life. .      Patient has reviewed and agreed to the above plan.      Jeannette Gilliam, Psychotherapist Trainee, [facility]    June 13, 2023  __________________________________________________________________________________      [facility]                                       Godfrey PRIYANK Chiquita     SAFETY PLAN:   \"I am going to shoot myself\"   Emotions    Anger toward himself    Frustration   Come up when he thinks about    People     Ruthy     Millie and other people he cares about are to a lesser degree   \"thought spirals\"    Something I say or do reminds me of " "sj    I wish I would have....    Then it becomes and overwhelming feeling of anger and frustration    \"emotion onion\"     Sadness     Dispar   2018/2019 was last urge   **National suicide hotline (Call or text them) and 911 - call  Step 1: Warning signs / cues (Thoughts, images, mood, situation, behavior) that a crisis may be developing:  Thoughts: \"People would be better off without me\", \"I'm a burden\", \"I can't do this anymore\" and \"Nothing makes it better\"  Images: flashbacks  Thinking Processes: ruminations (can't stop thinking about my problems): financial concerns and job concerns, racing thoughts, intrusive thoughts (bothersome, unwanted thoughts that come out of nowhere): thoughts of self harm and highly critical and negative thoughts: self judgemental thoughts and self distain  Mood: worsening depression, hopelessness, intense anger and disinhibited (not caring about things or consequences)  Behaviors: isolating/withdrawing , using drugs, using alcohol, aggression, not taking care of myself and not taking care of my responsibilities  Situations: relationship problems and financial stress   Step 2: Coping strategies - Things I can do to take my mind off of my problems without contacting another person (relaxation technique, physical activity):  Distress Tolerance Strategies:  relaxation activities: distraction activities in his home and sensory based activities/self-soothe with five senses: doing something that will taste sour and ground self using 5-4-3-2-1  Physical Activities: go for a walk and deep breathing  Focus on helpful thoughts:  \"This is temporary\", \"I will get through this\", \"Ride the wave\" and remind myself of what is important to me: family, job  Step 3: People and social settings that provide distraction:   Name: best friend Phone: pt has phone number in his phone  movie theater, coffee shop, park, library and gym    Step 4: Remind myself of people and things that are important to me and " worth living for:  Family and my best friends.    Step 5: When I am in crisis, I can ask these people to help me use my safety plan:   Name: father Phone: pt has phone number in phone  Step 6: Making the environment safe:   remove drugs, remove access to firearms: call father to take gun out of house and be around others  Step 7: Professionals or agencies I can contact during a crisis:  Suicide Prevention Lifeline: Call or Text 988   Local Crisis Services: 911, hospital    Call 911 or go to my nearest emergency department.   I helped develop this safety plan and agree to use it when needed.  I have been given a copy of this plan.      Client signature ________Jacob Dasilveira___________  Today s date:  6/20/2023  Completed by Provider Name/ Credentials:  Jeannette Gilliam, Psychotherapist Trainee, [facility]  June 20, 2023  Adapted from Safety Plan Template 2008 Candy Borges and Mart Yoder is reprinted with the express permission of the authors.  No portion of the Safety Plan Template may be reproduced without the express, written permission.  You can contact the authors at bhs@[email or web address] or [email or web address].

## 2023-08-23 ENCOUNTER — APPOINTMENT (OUTPATIENT)
Dept: CT IMAGING | Facility: CLINIC | Age: 33
End: 2023-08-23
Attending: EMERGENCY MEDICINE
Payer: COMMERCIAL

## 2023-08-23 ENCOUNTER — HOSPITAL ENCOUNTER (EMERGENCY)
Facility: CLINIC | Age: 33
Discharge: HOME | End: 2023-08-23
Attending: EMERGENCY MEDICINE | Admitting: EMERGENCY MEDICINE
Payer: COMMERCIAL

## 2023-08-23 VITALS
DIASTOLIC BLOOD PRESSURE: 108 MMHG | HEART RATE: 69 BPM | OXYGEN SATURATION: 95 % | SYSTOLIC BLOOD PRESSURE: 153 MMHG | TEMPERATURE: 97.9 F | RESPIRATION RATE: 20 BRPM

## 2023-08-23 DIAGNOSIS — S02.609A CLOSED FRACTURE OF MANDIBLE, UNSPECIFIED LATERALITY, UNSPECIFIED MANDIBULAR SITE, INITIAL ENCOUNTER (H): ICD-10-CM

## 2023-08-23 PROCEDURE — 99284 EMERGENCY DEPT VISIT MOD MDM: CPT | Mod: 25

## 2023-08-23 PROCEDURE — 70486 CT MAXILLOFACIAL W/O DYE: CPT

## 2023-08-23 RX ORDER — OXYCODONE HCL 5 MG/5 ML
5 SOLUTION, ORAL ORAL EVERY 6 HOURS PRN
Qty: 100 ML | Refills: 0 | Status: SHIPPED | OUTPATIENT
Start: 2023-08-23 | End: 2023-08-28

## 2023-08-23 ASSESSMENT — ACTIVITIES OF DAILY LIVING (ADL)
ADLS_ACUITY_SCORE: 35
ADLS_ACUITY_SCORE: 35

## 2023-08-23 NOTE — PROGRESS NOTES
Oral and Maxillofacial Surgery (OMFS) Clinic  [facility]  Lisa Lewes - 7th floor  [address] [place] [id number]  Clinic phone number: [phone]  Clinic fax number: [phone]     The [facility] clinic staff will call to schedule follow up on Monday 3/28/2023     Divya French DDS   FS intern   Pager [phone]

## 2023-08-23 NOTE — ED TRIAGE NOTES
Patient had surgery on his jaw last Saturday. Friday night patient lost consciousness and went down the stairs and fractured jaw. 4 screws in his jaw and rubber bands holding his jaw shut. Here to get wires to actually wire his jaw shut. Wants to consult with a surgeon about the pain and ongoing care.

## 2023-08-24 ENCOUNTER — TELEPHONE (OUTPATIENT)
Dept: OTOLARYNGOLOGY | Facility: CLINIC | Age: 33
End: 2023-08-24
Payer: COMMERCIAL

## 2023-08-24 NOTE — TELEPHONE ENCOUNTER
[facility] Call Center    Phone Message    May a detailed message be left on voicemail: yes     Reason for Call: Other: Patient was seen in ER on 8/23 for fracture mandible in 3 places was told he would get a call today to assist in scheduling. Not showing any referral in our side. Please verify and reach out if something can be scheduled. Thank you      Action Taken: Other: ENT    Travel Screening: Not Applicable

## 2023-08-24 NOTE — DISCHARGE INSTRUCTIONS
Continue soft diet until follow-up with oral surgery. They should call you tomorrow to schedule follow-up for Monday.     I think it's ok to take ibuprofen to help with pain. 600mg every 6 hours. For tylenol you can take 1,000mg every 6 hours.     I refilled your oxycodone to help you get to surgery.     Return to emergency department for fever, uncontrolled pain, vomiting, or for any other concerns.

## 2023-08-25 NOTE — ED PROVIDER NOTES
History     Chief Complaint:  Post-op Problem       HPI   Godfrey Porras is a 33 year old male who presents with CC jaw pain. Pt was vacationing in [place] when he suffered a fall and jaw fracture 5 days ago. He underwent a maxillomandibular fixation and was instructed to follow-up with oral surgery when he returned to [place]. He returned on a flight today and presents to ED for assistance in arranging his oral surgery follow-up. Also needs refill of pain medication. He's been taking 5mg liquid oxycodone every 6 hours for pain and 650mg tylenol every 6 hours as well. He states he was told he can't take nsaids. He states he hasn't had much to eat and drink because of the pain. He's on a full liquid diet at the moment. He denies headache.       Independent Historian:   None - Patient Only    Review of External Notes:   Reviewed hospital records from [facility] in [place] where pt was seen a few days ago.       Medications:    oxyCODONE (ROXICODONE) 5 MG/5ML solution  albuterol (PROAIR HFA/PROVENTIL HFA/VENTOLIN HFA) 108 (90 Base) MCG/ACT inhaler  clobetasol (TEMOVATE) 0.05 % external cream  fluticasone-salmeterol (ADVAIR HFA) 115-21 MCG/ACT inhaler  fluticasone-salmeterol (ADVAIR) 100-50 MCG/ACT inhaler  lisdexamfetamine (VYVANSE) 20 MG capsule  montelukast (SINGULAIR) 10 MG tablet        Past Medical History:    Past Medical History:   Diagnosis Date    ADD (attention deficit disorder) without hyperactivity     INDY (generalized anxiety disorder)     Major depression     Mild persistent asthma        Past Surgical History:    Past Surgical History:   Procedure Laterality Date    Left hand surgery for finger fractures Left 2007    3 carpal bones with pins (2 removed)        Physical Exam     Physical Exam    Head:  The scalp, face, and head appear normal  Eyes:  Conjunctivae are normal  ENT:    Bilateral jaw is swollen/bruised.  Neck:  Trachea midline  CV:  Normal rate. No pain to palpation of chest.   Resp:  No  respiratory distress   Musc:  Normal muscular tone  Skin:  No rash or lesions noted  Neuro: Speech is normal and fluent. Face is symmetric. Moving all extremities well.             Emergency Department Course       Imaging:  CT Facial Bones without Contrast   Final Result    IMPRESSION:    1.  Mandibular fractures described above.            Report per radiology    L    Emergency Department Course & Assessments:         Independent Interpretation (X-rays, CTs, rhythm strip):  I independently reviewed the facial CT and visualize bilateral mandibular fractures. These were reviewed with pt at bedside.     Consultations/Discussion of Management or Tests:     The patient arrived in triage where vitals were measured and recorded.   The patient was then escorted back to the emergency department.   The patient's medical records were reviewed.  Nursing notes and vitals were reviewed.    I performed an exam of the patient as documented above. The patient is in agreement with my plan of care.       Social Determinants of Health affecting care:   None    Disposition:  The patient was discharged to home.     Impression & Plan        Medical Decision Making:  Pt presents with CC jaw pain after suffering broken mandible several days ago out of state. Pt just returned via flight and now presents to be seen by oral surgery. Discussed with pt that his injury does not indicate an emergent surgery. I did contact on call oral surgery who asked for imaging either from [place] or from here. Unfortunately, due to differences in PACs software, images were not able to be ported from [place] and we obtained new CT. This showed bilateral mandibular fracture. Notably, initial imaging did not show a right-sided mandibular fracture. Pt will have clinic follow-up with oral surgery in about 4 days. They'll call him tomorrow to schedule. I spoke with Dr. French multiple times to facilitate follow-up. I also refilled pt's oxycodone rx. We discussed  risks/benefits of ibuprofen use, and I think he'd benefit since his pain has largely been poorly controlled. We also discussed dietary restrictions. He is in stable condition at the time of discharge, indications for return to the ED were discussed as well as follow up. All questions were answered and he is in agreement with the plan.      Diagnosis:    ICD-10-CM    1. Closed fracture of mandible, unspecified laterality, unspecified mandibular site, initial encounter (H)  S02.609A            Discharge Medications:  Discharge Medication List as of 8/23/2023  7:38 PM        START taking these medications    Details   oxyCODONE (ROXICODONE) 5 MG/5ML solution Take 5 mLs (5 mg) by mouth every 6 hours as needed for moderate to severe pain, Disp-100 mL, R-0, E-Prescribe                   Hermann Hamilton MD  08/25/23 1125

## 2023-08-26 ENCOUNTER — NURSE TRIAGE (OUTPATIENT)
Dept: NURSING | Facility: CLINIC | Age: 33
End: 2023-08-26
Payer: COMMERCIAL

## 2023-08-27 NOTE — TELEPHONE ENCOUNTER
"Pt gave verbal consent to discuss any information in chart with father Lawrence. Pt has pain \"mostly on right side of head, whole right side of head\". Pt sustained a jaw fracture eight days ago. Pt has acute worsening of pain per Lawrence. Last took oxycodone medication at 2 pm today. Pt also has been taking 600 mg ibuprofen but pt doesn't know when he took last dose. Lawrence states \"I work so it's hard for me to know\". Pt's temperature at time of call is 98.3. Lawrence denies pt has acute swelling, erythema. Lawrence does not think pt has altered consciousness or confusion. Pt has urinated today per Lawrence. Lawrence also wondering if pt can get a nebulizer as his inhaler is not very helpful in his condition. Pt is not urgently needing inhaler at this time per Lawrence.    Writer advised Lawrence pt can take another oxycodone now. Keep track of pain medications pt takes, follow instructions for ibuprofen and oxycodone, alternating. Call back if not controlling pain or any new or worsening symptoms such as fever, swelling erythema, no urination, confusion, severe pain etc. Pt should be seen in UC tomorrow for nebulizer prescription and evaluation of progress per nursing judgement.     Lawrence verbalizes understanding and agrees to plan.       Reason for Disposition   Nursing judgment    Protocols used: No Guideline or Reference [id number]    "

## 2023-08-28 ENCOUNTER — OFFICE VISIT (OUTPATIENT)
Dept: OTOLARYNGOLOGY | Facility: CLINIC | Age: 33
End: 2023-08-28
Payer: COMMERCIAL

## 2023-08-28 VITALS — HEART RATE: 69 BPM | SYSTOLIC BLOOD PRESSURE: 134 MMHG | DIASTOLIC BLOOD PRESSURE: 83 MMHG

## 2023-08-28 DIAGNOSIS — S02.652G: Primary | ICD-10-CM

## 2023-08-28 PROCEDURE — 99204 OFFICE O/P NEW MOD 45 MIN: CPT | Performed by: STUDENT IN AN ORGANIZED HEALTH CARE EDUCATION/TRAINING PROGRAM

## 2023-08-28 ASSESSMENT — PAIN SCALES - GENERAL: PAINLEVEL: MODERATE PAIN (4)

## 2023-08-28 NOTE — NURSING NOTE
Chief Complaint   Patient presents with    Consult     ER follow up, fracture mandible, pain level 4/10 today, taking oxycodone and IBU- took both 8am this morning, swelling     He requests these members of his care team be copied on today's visit information: pcp    PCP: No Ref-Primary, Physician    Referring Provider:  Referred Self, MD  No address on file    Vitals:    08/28/23 0854   BP: 134/83   BP Location: Right arm   Patient Position: Sitting   Cuff Size: Adult Large   Pulse: 69       There is no height or weight on file to calculate BMI.    Medications were reconciled.    Does patient need any medication refills at today's visit? none      Germaine Aggarwal CMA

## 2023-08-28 NOTE — PROGRESS NOTES
"Facial Plastic and Reconstructive Surgery Consultation    Referring Provider:   Referred Self, MD  No address on file    HPI:   I had the pleasure of seeing Godfrey Porras today in clinic for consultation for facial trauma. Godfrey Porras is a 33 year old male.     Patient reports he was vacationing in [place] when he suffered a fall and jaw fracture. He underwent a maxillomandibular fixation on 8/19/2023 in [place]. Per the operative report, \"Johnny 8mm IMF screws used for fixation x 4\". Elastics were placed and he was instructed to follow up on return to [place]. Imaging at the time of presentation in [place] reportedly only showed a left mandibular angle fracture. When he presented to [facility] last week, there was also noted to be a right condylar neck fracture in addition to a minimally displaced left posterior body/angle fracture.     Today, he is now 10 days out from injury and 9 days out from placement of IMF screws and MMF. Today, patient reports that after surgery in [place], the physician there told him that he did not want to wire his jaw shut given that he was going on a plane but that he should come and have that done when he returned home.  Since then, he has been using elastic bands but is down to his last 2 and is able to move his jaw quite a bit.  He feels like his teeth are coming together normally.  He does not have any significant pain unless he is opening his jaw and then he has pain in the left area of the ankle fracture.  He denies any other previous trauma to his jaw.  He does note that he is got numbness on the left side of his face but notes that some of this was present before the injury as he has had a lot of dental work in the past.  He denies any issues with moving his face.      Review Of Systems  ROS: 10 point ROS neg other than the symptoms noted above in the HPI.    Patient Active Problem List   Diagnosis    ADD (attention deficit disorder) without hyperactivity    " Marijuana use    Major depressive disorder, recurrent episode, moderate (H)    INDY (generalized anxiety disorder)    Mild persistent asthma without complication    Foreskin does not retract    Phimosis     Past Surgical History:   Procedure Laterality Date    Left hand surgery for finger fractures Left     3 carpal bones with pins (2 removed)     Current Outpatient Medications   Medication Sig Dispense Refill    albuterol (PROAIR HFA/PROVENTIL HFA/VENTOLIN HFA) 108 (90 Base) MCG/ACT inhaler Inhale 2 puffs into the lungs every 4 hours as needed for wheezing for asthma symptoms. 18 g 5    clobetasol (TEMOVATE) 0.05 % external cream Apply topically 2 times daily 45 g 1    fluticasone-salmeterol (ADVAIR HFA) 115-21 MCG/ACT inhaler Inhale 2 puffs into the lungs 2 times daily for asthma prevention. 36 g 11    fluticasone-salmeterol (ADVAIR) 100-50 MCG/ACT inhaler Inhale 1 puff into the lungs every 12 hours for 30 days 1 each 0    lisdexamfetamine (VYVANSE) 20 MG capsule Take 1 capsule (20 mg) by mouth every morning 30 capsule 0    montelukast (SINGULAIR) 10 MG tablet Take 1 tablet (10 mg) by mouth At Bedtime for 30 days 30 tablet 0    oxyCODONE (ROXICODONE) 5 MG/5ML solution Take 5 mLs (5 mg) by mouth every 6 hours as needed for moderate to severe pain 100 mL 0     Amoxicillin, Food, and Penicillins  Social History     Socioeconomic History    Marital status: Single     Spouse name: Not on file    Number of children: 0    Years of education: Not on file    Highest education level: Not on file   Occupational History    Occupation:      Employer: SELF EMPLOYED.   Tobacco Use    Smoking status: Former     Packs/day: 1.00     Years: 5.00     Pack years: 5.00     Types: Cigarettes     Quit date: 2012     Years since quittin.3    Smokeless tobacco: Never    Tobacco comments:     2.5 years   Vaping Use    Vaping Use: Never used   Substance and Sexual Activity    Alcohol use: Yes     Alcohol/week: 4.0 - 6.0  standard drinks of alcohol     Types: 4 - 6 Standard drinks or equivalent per week     Comment: Fri and Sat    Drug use: Yes     Frequency: 3.0 times per week     Types: Marijuana     Comment: 2-3    Sexual activity: Yes     Partners: Female     Birth control/protection: Pill, Condom   Other Topics Concern     Service No    Blood Transfusions No    Caffeine Concern No    Occupational Exposure No    Hobby Hazards No    Sleep Concern No    Stress Concern No    Weight Concern No    Special Diet No    Back Care No    Exercise No    Bike Helmet No    Seat Belt Yes    Self-Exams No    Parent/sibling w/ CABG, MI or angioplasty before 65F 55M? Yes     Comment: Maternal Grandpa- bypass heart surgery   Social History Narrative    Not on file     Social Determinants of Health     Financial Resource Strain: Not on file   Food Insecurity: Not on file   Transportation Needs: Not on file   Physical Activity: Not on file   Stress: Not on file   Social Connections: Not on file   Intimate Partner Violence: Not on file   Housing Stability: Not on file     Family History   Problem Relation Age of Onset    Depression Mother     Asthma Father     Breast Cancer Maternal Grandmother     Depression Maternal Grandmother     Myocardial Infarction Paternal Grandfather 61        multiple,     Depression Maternal Aunt     Hypertension No family hx of     Cerebrovascular Disease No family hx of     Diabetes No family hx of        PE:  Alert and Oriented, Answering Questions Appropriately  Atraumatic, Normocephalic, Face Symmetric  Skin: Oneal 2  Facial Nerve Intact and facial movement symmetric  He has decreased sensation over the left V3 distribution.  EOMI  Nasal Exam: No external Deformity  Chin: Normal   Lips/Teeth/Toungue/Gums: Lips intact.  There are 4 IMF screws present and there is one elastic going from the screw on the maxilla to the screw on the mandible bilaterally.  He is able to open his mouth some.  He has  expected swelling left greater than right.  Neck: Trachea midline  Chest: No wheezing, cyanosis, or stridor  Card: not diaphoretic  Neuro/Psych: CN's 2-12 intact, Moves all extremities, ambulation in intact, positive affect, no notable muscle weakness          Imaging: CT max face from his ER visit last week is personally reviewed by me.  This shows a left ankle fracture that is slightly displaced.  He also has a right nondisplaced subcondylar fracture.  No other obvious bony trauma is seen.      IMPRESSION/PLAN: Godfrey Porras is a 33 year old male presenting for evaluation for facial trauma.  He suffered trauma during a fall while on vacation in [place] and underwent IMF screw placement on August 19, 2023.  Since that time, he has been keeping his mouth closed with elastics though he does note that he is able to move his jaw and open it some.  Doing this causes pain in the left jaw.  He otherwise feels like his teeth are coming together normally.  We do not have access to wires or heavy elastics here in [place], and therefore he needs to be seen in a different clinic as soon as possible to make sure that we are immobilizing his jaw so these fractures can heal as he right now is at a high risk for both non and malunion of the left mandibular angle fracture.  Per review of the notes, it appears that when he was seen in the ER last Friday they were in contact with the OMFS team who was planning to reach out to him today.  We reached out to their office and they will see him today.  Until then, I recommended that he try and keep his jaw as close as possible with minimal movement.      Photodocumentation was obtained.

## 2023-08-31 ENCOUNTER — OFFICE VISIT (OUTPATIENT)
Dept: PSYCHOLOGY | Facility: CLINIC | Age: 33
End: 2023-08-31
Payer: COMMERCIAL

## 2023-08-31 DIAGNOSIS — F33.1 MAJOR DEPRESSIVE DISORDER, RECURRENT EPISODE, MODERATE WITH ANXIOUS DISTRESS (H): Primary | ICD-10-CM

## 2023-08-31 PROCEDURE — 90834 PSYTX W PT 45 MINUTES: CPT

## 2023-08-31 NOTE — PROGRESS NOTES
"[facility]                                     Progress Note    Patient Name: Godfrey Porras  Date: 8/31/23           Service Type: Individual      Session Start Time: 10:07am Session End Time: 10:52am     Session Length: 45 minutes    Session #: 10    Attendees: Client attended alone    Service Modality:  In person    DATA  Extended Session (53+ minutes): No  Interactive Complexity: No  Crisis: No        Progress Since Last Session (Related to Symptoms / Goals / Homework):   Symptoms: Improving mental health symptoms related to depression    Homework: Achieved / completed to satisfaction      Episode of Care Goals: Satisfactory progress - ACTION (Actively working towards change); Intervened by reinforcing change plan / affirming steps taken     Current / Ongoing Stressors and Concerns:   Pt reports ongoing stressors as dealing with a broken jaw from an injury while traveling and working on trying to find a job with a normal schedule. Pt reports mood since last session has been \"okay\" reporting higher levels of depression initially after breaking jaw. Pt reports his mental health shifted after realizing the THC he was using was causing him to be more anxious than normal and contribute to worsening depressive symptoms. Pt reports he has not had any alcohol since the incident that caused him to break his jaw and appears to have gained insight into how substances are affecting his mental health. Pt and provider discussed the feelings and emotions pt has had after the accident and how that shifted his perspective on the priorities in his life. Pt appears to have gained insight into his desire to live \"a better life\" and has found motivation to create lasting change in his life. Pt appears to be working toward finding a job that has scheduled hours and create a routine that helps him to reach his goals. Pt denies any active SI, SIB, or HI since last session. Pt endorses the same passive SI since " "last appointment and contracted for safety during session. Pt feels that he will be able to keep himself safe and is \"looking forward to the future\". See below for plan of care until next appointment.       Treatment Objective(s) Addressed in This Session:   use at least 2 coping skills for anxiety management in the next 2 weeks  Increase interest, engagement, and pleasure in doing things  Feel less tired and more energy during the day   Identify negative self-talk and behaviors: challenge core beliefs, myths, and actions  Improve concentration, focus, and mindfulness in daily activities   Decrease thoughts that you'd be better off dead or of suicide / self-harm     Intervention:   CBT: thinking pattern identification, discussion of behavioral activation related to depressive thinking patterns, discussed cognitive distortions and challenging thinking patterns  Motivational Interviewing: evoking change talk, growing internal motivation for change, OARS    Assessments completed prior to visit:  The following assessments were completed by patient for this visit:  PROMIS 10-Global Health (all questions and answers displayed):       3/29/2022     9:02 AM 8/8/2022     9:50 AM 5/31/2023    10:16 AM 6/7/2023     1:18 PM   PROMIS 10   In general, would you say your health is: Good Fair  Fair   In general, would you say your quality of life is: Good Good  Good   In general, how would you rate your physical health? Fair Fair  Good   In general, how would you rate your mental health, including your mood and your ability to think? Fair Fair  Poor   In general, how would you rate your satisfaction with your social activities and relationships? Fair Poor  Poor   In general, please rate how well you carry out your usual social activities and roles Poor Good  Poor   To what extent are you able to carry out your everyday physical activities such as walking, climbing stairs, carrying groceries, or moving a chair? Completely Completely "  Mostly   In the past 7 days, how often have you been bothered by emotional problems such as feeling anxious, depressed, or irritable? Often Sometimes  Often   In the past 7 days, how would you rate your fatigue on average? Moderate Moderate  Severe   In the past 7 days, how would you rate your pain on average, where 0 means no pain, and 10 means worst imaginable pain? 2 3  3   In general, would you say your health is: 3 2 3 2   In general, would you say your quality of life is: 3 3 3 3   In general, how would you rate your physical health? 2 2 3 3   In general, how would you rate your mental health, including your mood and your ability to think? 2 2 1 1   In general, how would you rate your satisfaction with your social activities and relationships? 2 1 2 1   In general, please rate how well you carry out your usual social activities and roles. (This includes activities at home, at work and in your community, and responsibilities as a parent, child, spouse, employee, friend, etc.) 1 3 2 1   To what extent are you able to carry out your everyday physical activities such as walking, climbing stairs, carrying groceries, or moving a chair? 5 5 4 4   In the past 7 days, how often have you been bothered by emotional problems such as feeling anxious, depressed, or irritable? 4 3 5 4   In the past 7 days, how would you rate your fatigue on average? 3 3 5 4   In the past 7 days, how would you rate your pain on average, where 0 means no pain, and 10 means worst imaginable pain? 2 3 3 3   Global Mental Health Score 9 9 7 7   Global Physical Health Score 14 14 12 13   PROMIS TOTAL - SUBSCORES 23 23 19 20         ASSESSMENT: Current Emotional / Mental Status (status of significant symptoms):   Risk status (Self / Other harm or suicidal ideation)   Patient denies current fears or concerns for personal safety.   Patient reports the following current or recent suicidal ideation or behaviors: pt endorses passive SI and denies  having any intention or plan of harming himself during appointment on 8/31/23. Pt agrees to use resources and follow safety plan if needed.   Patient denies current or recent homicidal ideation or behaviors.   Patient denies current or recent self injurious behavior or ideation.   Patient denies other safety concerns.   Patient reports there has been no change in risk factors since their last session.     Patient reports there has been no change in protective factors since their last session.     A safety and risk management plan has been developed including: Patient consented to co-developed safety plan on 6/20/23.  Safety and risk management plan was reviewed.   Patient agreed to use safety plan should any safety concerns arise.  A copy was made available to the patient.     Appearance:   Appropriate    Eye Contact:   Good    Psychomotor Behavior: Normal    Attitude:   Cooperative    Orientation:   All   Speech    Rate / Production: Normal     Volume:  Normal    Mood:    Anxious  Depressed    Affect:    Appropriate  Bright    Thought Content:  Clear    Thought Form:  Coherent  Logical    Insight:    Good      Medication Review:   No changes to current psychiatric medication(s)     Medication Compliance:   Yes     Changes in Health Issues:   None reported     Chemical Use Review:   Substance Use: decrease in alcohol , cannabis , and cocaine .  Patient reports frequency of use only in the evenings for THC to help him sleep, no alcohol use since 8/19/23 and no cocaine use over the last month.  Stage of Change: Action  Patient assessed present costs and future losses as a result of substance use  Reviewed concerns related to health related substance abuse risk  Provided support and affirmation for steps taken towards sobriety    .   Tobacco Use: No current tobacco use.      Diagnosis:  Major Depressive Disorder, Recurrent Episode, Moderate with anxious distress    Collateral Reports Completed:   Not  Applicable    PLAN: (Patient Tasks / Therapist Tasks / Other)  Pt is to apply for at least one job per day.  Pt is to making taking care of himself a priority in his life. Pt is to come to next session and discuss successes.  Pt is to continue to follow safety plan before next appointment, if needed.        Jeannette Gilliam, Psychotherapist Trainee, [facility]  8/31/23    This note has been reviewed and I agree with the plan of care. This note is co-signed by Valentine King MA, [facility] Supervisor, on: 9/1/2023                                                           ______________________________________________________________________    Individual Treatment Plan    Patient's Name: Godfrey Porras  YOB: 1990    Date of Creation: 6/13/23  Date Treatment Plan Last Reviewed/Revised: 6/13/23    DSM5 Diagnoses: 296.32 (F33.1) Major Depressive Disorder, Recurrent Episode, Moderate With anxious distress  Psychosocial / Contextual Factors: loss of important relationship, stress at work and stress within important roles, death of close friends  PROMIS (reviewed every 90 days): 20    Referral / Collaboration:  Referral to another professional/service is not indicated at this time..    Anticipated number of session for this episode of care: 9-12 sessions  Anticipation frequency of session: Weekly  Anticipated Duration of each session: 38-52 minutes  Treatment plan will be reviewed in 90 days or when goals have been changed.       MeasurableTreatment Goal(s) related to diagnosis / functional impairment(s)  Goal 1: Patient will decrease his depressive symptoms by 50%.    I will know I've met my goal when I can do things I enjoy doing again.      Objective #A    Patient will Decrease frequency and intensity of feeling down, depressed, hopeless.  Status: New - Date: 6/13/23      Intervention(s)  Therapist will teach emotion regulation skills and coping skills for his depressive symptoms. Pt will be assigned homework  related to practicing skills outside of therapy.    Objective #B  Patient will Identify negative self-talk and behaviors: challenge core beliefs, myths, and actions.   Status: New - Date: 6/13/23     Intervention(s)  Therapist will teach about thinking patterns and core beliefs/automatic thoughts. Pt will be assigned homework related to recognizing emotions and thinking patterns within himself to discuss during session .    Objective #C  Patient will Decrease thoughts that you'd be better off dead or of suicide / self-harm.   Status: New - Date: 6/13/23     Intervention(s)  Therapist will teach emotion regulation skills, distress tolerance skills, and thought modification to replace his maladaptive automatic thoughts. Pt will be assigned homework of thought logs and practicing skills outside of therapy .    Objective #D   Patient will use cognitive strategies identified in therapy to challenge anxious thoughts    Status: New - Date: 6/13/23     Intervention(s)  Therapist will assign homework related to thought challenging outside of the therapy room using emotion regulation skills learned .    Objective #E  Patient will identify 5 values that are important to him and the way he lives his life. Pt will identify ways he can live with those values in mind.    Status: New - Date: 6/13/23     Intervention(s)  Therapist will introduce ACT and value identification to pt. Pt will be assigned homework related to behavioral activation.    Objective #F  Patient will talk to at least two others about losses and coping.   Status: New - Date: 6/13/23     Intervention(s)  Therapist will assign homework related to pt reaching out and building stronger social support in his life. .      Patient has reviewed and agreed to the above plan.      Jeannette Gilliam, Psychotherapist Trainee, [facility]    June 13, 2023  __________________________________________________________________________________      [facility]             "                           Godfrey YOST Chiquita     SAFETY PLAN:   \"I am going to shoot myself\"   Emotions    Anger toward himself    Frustration   Come up when he thinks about    People     Sj Pinto and other people he cares about are to a lesser degree   \"thought spirals\"    Something I say or do reminds me of sj    I wish I would have....    Then it becomes and overwhelming feeling of anger and frustration    \"emotion onion\"     Sadness     Dispar   2018/2019 was last urge   **National suicide hotline (Call or text them) and 911 - call  Step 1: Warning signs / cues (Thoughts, images, mood, situation, behavior) that a crisis may be developing:  Thoughts: \"People would be better off without me\", \"I'm a burden\", \"I can't do this anymore\" and \"Nothing makes it better\"  Images: flashbacks  Thinking Processes: ruminations (can't stop thinking about my problems): financial concerns and job concerns, racing thoughts, intrusive thoughts (bothersome, unwanted thoughts that come out of nowhere): thoughts of self harm and highly critical and negative thoughts: self judgemental thoughts and self distain  Mood: worsening depression, hopelessness, intense anger and disinhibited (not caring about things or consequences)  Behaviors: isolating/withdrawing , using drugs, using alcohol, aggression, not taking care of myself and not taking care of my responsibilities  Situations: relationship problems and financial stress   Step 2: Coping strategies - Things I can do to take my mind off of my problems without contacting another person (relaxation technique, physical activity):  Distress Tolerance Strategies:  relaxation activities: distraction activities in his home and sensory based activities/self-soothe with five senses: doing something that will taste sour and ground self using 5-4-3-2-1  Physical Activities: go for a walk and deep breathing  Focus on helpful thoughts:  \"This is temporary\", \"I will get through this\", " "\"Ride the wave\" and remind myself of what is important to me: family, job  Step 3: People and social settings that provide distraction:   Name: best friend Phone: pt has phone number in his phone  movie theater, coffee shop, park, library and gym    Step 4: Remind myself of people and things that are important to me and worth living for:  Family and my best friends.    Step 5: When I am in crisis, I can ask these people to help me use my safety plan:   Name: father Phone: pt has phone number in phone  Step 6: Making the environment safe:   remove drugs, remove access to firearms: call father to take gun out of house and be around others  Step 7: Professionals or agencies I can contact during a crisis:  Suicide Prevention Lifeline: Call or Text 988   Local Crisis Services: 911, hospital    Call 911 or go to my nearest emergency department.   I helped develop this safety plan and agree to use it when needed.  I have been given a copy of this plan.      Client signature ________Godfrey Rodriguezmarielenashanell___________  Today s date:  6/20/2023  Completed by Provider Name/ Credentials:  Jeannette Gilliam, Psychotherapist Trainee, [facility]  June 20, 2023  Adapted from Safety Plan Template 2008 Candy Borges and Mart Yoder is reprinted with the express permission of the authors.  No portion of the Safety Plan Template may be reproduced without the express, written permission.  You can contact the authors at [email or web address] or [email or web address].  "

## 2023-09-19 ENCOUNTER — OFFICE VISIT (OUTPATIENT)
Dept: PSYCHOLOGY | Facility: CLINIC | Age: 33
End: 2023-09-19
Payer: COMMERCIAL

## 2023-09-19 DIAGNOSIS — F33.1 MAJOR DEPRESSIVE DISORDER, RECURRENT EPISODE, MODERATE WITH ANXIOUS DISTRESS (H): Primary | ICD-10-CM

## 2023-09-19 PROCEDURE — 90837 PSYTX W PT 60 MINUTES: CPT

## 2023-09-19 NOTE — PROGRESS NOTES
"[facility]                                     Progress Note    Patient Name: Godfrey Porras  Date: 9/19/23           Service Type: Individual      Session Start Time: 10:01am Session End Time: 10:58am     Session Length: 57 minutes    Session #: 11    Attendees: Client attended alone    Service Modality:  In person    DATA  Extended Session (53+ minutes): Extended Session (53+ minutes): PROLONGED SERVICE IN THE OUTPATIENT SETTING REQUIRING DIRECT (FACE-TO-FACE) PATIENT CONTACT BEYOND THE USUAL SERVICE:     - Patient's presenting concerns require more intensive intervention than could be completed within the usual service   Interactive Complexity: No  Crisis: No        Progress Since Last Session (Related to Symptoms / Goals / Homework):   Symptoms: Improving overall mental health symptoms    Homework: Achieved / completed to satisfaction      Episode of Care Goals: Satisfactory progress - ACTION (Actively working towards change); Intervened by reinforcing change plan / affirming steps taken     Current / Ongoing Stressors and Concerns:   Pt reports ongoing stressors as finances and getting a regularly scheduled job. Pt reports mood since last session has been \"better\" as his mental health symptoms have decreased since last appointment. Pt reports he has been drinking less which he believes has been helping his mood and has still been having health concerns related to his jaw. Pt and provider discussed pt's core beliefs of himself and the world around him, and how those have impacted the way he perceives his place in the world. Pt appears to have gained insight into his desire to grow more authentic relationships with the people in his life. Pt appears to be working toward figuring out what is important to him in his life. Pt denies any SI, SIB, or HI since last session. See below for plan of care until next appointment.       Treatment Objective(s) Addressed in This Session:   use " relaxation strategies 1 times per day to reduce the physical symptoms of anxiety  Increase interest, engagement, and pleasure in doing things  Feel less tired and more energy during the day   Identify negative self-talk and behaviors: challenge core beliefs, myths, and actions  Improve concentration, focus, and mindfulness in daily activities      Intervention:   CBT: thinking pattern identification, discussion of behavioral activation related to depressive thinking patterns, discussed cognitive distortions and challenging thinking patterns  Interpersonal Therapy: discussion of how he views relationships in his life and his role in those relationships with others    Assessments completed prior to visit:  The following assessments were completed by patient for this visit:  PROMIS 10-Global Health (all questions and answers displayed):       3/29/2022     9:02 AM 8/8/2022     9:50 AM 5/31/2023    10:16 AM 6/7/2023     1:18 PM   PROMIS 10   In general, would you say your health is: Good Fair  Fair   In general, would you say your quality of life is: Good Good  Good   In general, how would you rate your physical health? Fair Fair  Good   In general, how would you rate your mental health, including your mood and your ability to think? Fair Fair  Poor   In general, how would you rate your satisfaction with your social activities and relationships? Fair Poor  Poor   In general, please rate how well you carry out your usual social activities and roles Poor Good  Poor   To what extent are you able to carry out your everyday physical activities such as walking, climbing stairs, carrying groceries, or moving a chair? Completely Completely  Mostly   In the past 7 days, how often have you been bothered by emotional problems such as feeling anxious, depressed, or irritable? Often Sometimes  Often   In the past 7 days, how would you rate your fatigue on average? Moderate Moderate  Severe   In the past 7 days, how would you rate  your pain on average, where 0 means no pain, and 10 means worst imaginable pain? 2 3  3   In general, would you say your health is: 3 2 3 2   In general, would you say your quality of life is: 3 3 3 3   In general, how would you rate your physical health? 2 2 3 3   In general, how would you rate your mental health, including your mood and your ability to think? 2 2 1 1   In general, how would you rate your satisfaction with your social activities and relationships? 2 1 2 1   In general, please rate how well you carry out your usual social activities and roles. (This includes activities at home, at work and in your community, and responsibilities as a parent, child, spouse, employee, friend, etc.) 1 3 2 1   To what extent are you able to carry out your everyday physical activities such as walking, climbing stairs, carrying groceries, or moving a chair? 5 5 4 4   In the past 7 days, how often have you been bothered by emotional problems such as feeling anxious, depressed, or irritable? 4 3 5 4   In the past 7 days, how would you rate your fatigue on average? 3 3 5 4   In the past 7 days, how would you rate your pain on average, where 0 means no pain, and 10 means worst imaginable pain? 2 3 3 3   Global Mental Health Score 9 9 7 7   Global Physical Health Score 14 14 12 13   PROMIS TOTAL - SUBSCORES 23 23 19 20         ASSESSMENT: Current Emotional / Mental Status (status of significant symptoms):   Risk status (Self / Other harm or suicidal ideation)   Patient denies current fears or concerns for personal safety.   Patient denies current or recent suicidal ideation or behaviors.   Patient denies current or recent homicidal ideation or behaviors.   Patient denies current or recent self injurious behavior or ideation.   Patient denies other safety concerns.   Patient reports there has been no change in risk factors since their last session.     Patient reports there has been no change in protective factors since their  "last session.     A safety and risk management plan has been developed including: Patient consented to co-developed safety plan on 6/20/23.  Safety and risk management plan was reviewed.   Patient agreed to use safety plan should any safety concerns arise.  A copy was made available to the patient.     Appearance:   Appropriate    Eye Contact:   Good    Psychomotor Behavior: Normal    Attitude:   Cooperative    Orientation:   All   Speech    Rate / Production: Normal     Volume:  Normal    Mood:    Anxious  Anhedonia   Affect:    Appropriate    Thought Content:  Clear    Thought Form:  Coherent  Logical    Insight:    Good      Medication Review:   No changes to current psychiatric medication(s)     Medication Compliance:   Yes     Changes in Health Issues:   None reported     Chemical Use Review:   Substance Use: decrease in alcohol  and cannabis .  Patient reports frequency of use as less than once a week.  Stage of Change: Action  Reviewed concerns related to health related substance abuse risk  Provided support and affirmation for steps taken towards sobriety    .   Tobacco Use: No current tobacco use.      Diagnosis:  Major Depressive Disorder, Recurrent Episode, Moderate with anxious distress    Collateral Reports Completed:   Not Applicable    PLAN: (Patient Tasks / Therapist Tasks / Other)  Pt is to continue to practice self care and make his health a priority in his daily life.  Pt is to answer the questions of \"who am I\" and \"What am I about\". Pt is to come to next appointment ready to discuss.        Jeannette Gilliam, Psychotherapist Trainee, [facility] [place]  9/19/23    This note has been reviewed and I agree with the plan of care. This note is co-signed by Valentine King MA, [facility] Supervisor, on: 9/19/2023                                                             ______________________________________________________________________    Individual Treatment Plan    Patient's Name: Godfrey PRIYANK Chiquita  Date Of " Birth: 1990    Date of Creation: 6/13/23  Date Treatment Plan Last Reviewed/Revised: 9/19/23    DSM5 Diagnoses: 296.32 (F33.1) Major Depressive Disorder, Recurrent Episode, Moderate With anxious distress  Psychosocial / Contextual Factors: loss of important relationship, stress at work and stress within important roles, death of close friends  PROMIS (reviewed every 90 days): 20    Referral / Collaboration:  Referral to another professional/service is not indicated at this time..    Anticipated number of session for this episode of care: 9-12 sessions  Anticipation frequency of session: Weekly  Anticipated Duration of each session: 38-52 minutes  Treatment plan will be reviewed in 90 days or when goals have been changed.       MeasurableTreatment Goal(s) related to diagnosis / functional impairment(s)  Goal 1: Patient will decrease his depressive symptoms by 50%.    I will know I've met my goal when I can do things I enjoy doing again.      Objective #A    Patient will Decrease frequency and intensity of feeling down, depressed, hopeless.  Status: Continued - Date(s): 9/19/23    Intervention(s)  Therapist will teach emotion regulation skills and coping skills for his depressive symptoms. Pt will be assigned homework related to practicing skills outside of therapy.    Objective #B  Patient will Identify negative self-talk and behaviors: challenge core beliefs, myths, and actions.   Status: Continued - Date(s): 9/19/23    Intervention(s)  Therapist will teach about thinking patterns and core beliefs/automatic thoughts. Pt will be assigned homework related to recognizing emotions and thinking patterns within himself to discuss during session .    Objective #C  Patient will Decrease thoughts that you'd be better off dead or of suicide / self-harm.   Status: Continued - Date(s): 9/19/23    Intervention(s)  Therapist will teach emotion regulation skills, distress tolerance skills, and thought modification to replace  "his maladaptive automatic thoughts. Pt will be assigned homework of thought logs and practicing skills outside of therapy .    Objective #D   Patient will use cognitive strategies identified in therapy to challenge anxious thoughts    Status: Continued - Date(s): 9/19/23    Intervention(s)  Therapist will assign homework related to thought challenging outside of the therapy room using emotion regulation skills learned .    Objective #E  Patient will identify 5 values that are important to him and the way he lives his life. Pt will identify ways he can live with those values in mind.    Status: Completed - Date: 9/19/23    Intervention(s)  Therapist will introduce ACT and value identification to pt. Pt will be assigned homework related to behavioral activation.    Objective #F  Patient will talk to at least two others about losses and coping.   Status: Continued - Date(s): 9/19/23    Intervention(s)  Therapist will assign homework related to pt reaching out and building stronger social support in his life. .      Patient has reviewed and agreed to the above plan.      Jeannette Gilliam, Psychotherapist Trainee, [facility]    September 19, 2023  __________________________________________________________________________________      [facility]                                       Godfrey YOST Chiquita     SAFETY PLAN:   \"I am going to shoot myself\"   Emotions    Anger toward himself    Frustration   Come up when he thinks about    People     Sj     Millie and other people he cares about are to a lesser degree   \"thought spirals\"    Something I say or do reminds me of sj    I wish I would have....    Then it becomes and overwhelming feeling of anger and frustration    \"emotion onion\"     Sadness     Dispar   2018/2019 was last urge   **National suicide hotline (Call or text them) and 911 - call  Step 1: Warning signs / cues (Thoughts, images, mood, situation, behavior) that a crisis may be " "developing:  Thoughts: \"People would be better off without me\", \"I'm a burden\", \"I can't do this anymore\" and \"Nothing makes it better\"  Images: flashbacks  Thinking Processes: ruminations (can't stop thinking about my problems): financial concerns and job concerns, racing thoughts, intrusive thoughts (bothersome, unwanted thoughts that come out of nowhere): thoughts of self harm and highly critical and negative thoughts: self judgemental thoughts and self distain  Mood: worsening depression, hopelessness, intense anger and disinhibited (not caring about things or consequences)  Behaviors: isolating/withdrawing , using drugs, using alcohol, aggression, not taking care of myself and not taking care of my responsibilities  Situations: relationship problems and financial stress   Step 2: Coping strategies - Things I can do to take my mind off of my problems without contacting another person (relaxation technique, physical activity):  Distress Tolerance Strategies:  relaxation activities: distraction activities in his home and sensory based activities/self-soothe with five senses: doing something that will taste sour and ground self using 5-4-3-2-1  Physical Activities: go for a walk and deep breathing  Focus on helpful thoughts:  \"This is temporary\", \"I will get through this\", \"Ride the wave\" and remind myself of what is important to me: family, job  Step 3: People and social settings that provide distraction:   Name: best friend Phone: pt has phone number in his phone  movie theater, coffee shop, park, library and gym    Step 4: Remind myself of people and things that are important to me and worth living for:  Family and my best friends.    Step 5: When I am in crisis, I can ask these people to help me use my safety plan:   Name: father Phone: pt has phone number in phone  Step 6: Making the environment safe:   remove drugs, remove access to firearms: call father to take gun out of house and be around others  Step 7: " Professionals or agencies I can contact during a crisis:  Suicide Prevention Lifeline: Call or Text 988   Local Crisis Services: 911, hospital    Call 911 or go to my nearest emergency department.   I helped develop this safety plan and agree to use it when needed.  I have been given a copy of this plan.      Client signature ________Godfrey Porras___________  Today s date:  6/20/2023  Completed by Provider Name/ Credentials:  Jeannette Gilliam, Psychotherapist Trainee, [facility]  June 20, 2023  Adapted from Safety Plan Template 2008 Candy Borges and Mart Yoder is reprinted with the express permission of the authors.  No portion of the Safety Plan Template may be reproduced without the express, written permission.  You can contact the authors at [email or web address] or [email or web address].

## 2023-09-21 DIAGNOSIS — F90.2 ATTENTION DEFICIT HYPERACTIVITY DISORDER (ADHD), COMBINED TYPE: ICD-10-CM

## 2023-09-21 RX ORDER — LISDEXAMFETAMINE DIMESYLATE 20 MG/1
20 CAPSULE ORAL EVERY MORNING
Qty: 30 CAPSULE | Refills: 0 | Status: SHIPPED | OUTPATIENT
Start: 2023-09-21 | End: 2023-10-26

## 2023-09-21 NOTE — TELEPHONE ENCOUNTER
Patient is calling to request a refill on his Vyvanse 20mg. Pharmacy desired attached. Please fill as able.

## 2023-10-03 ENCOUNTER — OFFICE VISIT (OUTPATIENT)
Dept: PSYCHOLOGY | Facility: CLINIC | Age: 33
End: 2023-10-03
Payer: COMMERCIAL

## 2023-10-03 DIAGNOSIS — F33.1 MAJOR DEPRESSIVE DISORDER, RECURRENT EPISODE, MODERATE WITH ANXIOUS DISTRESS (H): Primary | ICD-10-CM

## 2023-10-03 PROCEDURE — 90834 PSYTX W PT 45 MINUTES: CPT

## 2023-10-03 NOTE — PROGRESS NOTES
"[facility]                                     Progress Note    Patient Name: Godfrey Porras  Date: 10/03/23           Service Type: Individual      Session Start Time: 11:00am Session End Time: 11:52am     Session Length: 52 minutes    Session #: 12    Attendees: Client attended alone    Service Modality:  In person    DATA  Extended Session (53+ minutes): No  Interactive Complexity: No  Crisis: No        Progress Since Last Session (Related to Symptoms / Goals / Homework):   Symptoms: Worsening mental health symptoms since last session    Homework: Achieved / completed to satisfaction      Episode of Care Goals: Satisfactory progress - ACTION (Actively working towards change); Intervened by reinforcing change plan / affirming steps taken     Current / Ongoing Stressors and Concerns:   Pt reports ongoing stressors as finding a structured job and his increased substance use. Pt reports mood since last session has been \"up and down\" with more feelings of depression. Pt states sleeping has been an issue and he has increased his substance use over the last week. Pt denies any significant events happening since last session. Pt and provider discussed his motivation for using substances, his childhood experiences around relationships and substance use, and how his substance use may be impacting his mental health. Pt appears to have gained insight into how his early experiences may be shaping his behaviors unconsciously at this point in his life. Pt appears to be working toward finding a structured job and working to decrease his substance use. Pt denies any active SI, SIB, or HI since last session. Pt reports he has had increased passive SI as his depressive symptoms have increased over the last week. Pt informed provider that he has removed the fire arms from his home and are in a locked storage unit off site. Pt denies having a plan or intent on harming himself. Pt agrees to use safety plan " and feels he can keep himself safe. See below for plan of care until next appointment.     Treatment Objective(s) Addressed in This Session:   identify three distraction and diversion activities and use those activities to decrease level of anxiety    Increase interest, engagement, and pleasure in doing things  Feel less tired and more energy during the day   Identify negative self-talk and behaviors: challenge core beliefs, myths, and actions  Improve concentration, focus, and mindfulness in daily activities   identify the time in your life when you began to feel poorly about themselves     Intervention:   CBT: discussion of thoughts related to depression and anxiety, behaviors that are contributing to depressive thoughts  Psychodynamic: discussion on early childhood events and how those may continue to shape current perceptions of life events/create behaviors that are still showing in the present    Assessments completed prior to visit:  The following assessments were completed by patient for this visit:  PROMIS 10-Global Health (all questions and answers displayed):       3/29/2022     9:02 AM 8/8/2022     9:50 AM 5/31/2023    10:16 AM 6/7/2023     1:18 PM   PROMIS 10   In general, would you say your health is: Good Fair  Fair   In general, would you say your quality of life is: Good Good  Good   In general, how would you rate your physical health? Fair Fair  Good   In general, how would you rate your mental health, including your mood and your ability to think? Fair Fair  Poor   In general, how would you rate your satisfaction with your social activities and relationships? Fair Poor  Poor   In general, please rate how well you carry out your usual social activities and roles Poor Good  Poor   To what extent are you able to carry out your everyday physical activities such as walking, climbing stairs, carrying groceries, or moving a chair? Completely Completely  Mostly   In the past 7 days, how often have you been  bothered by emotional problems such as feeling anxious, depressed, or irritable? Often Sometimes  Often   In the past 7 days, how would you rate your fatigue on average? Moderate Moderate  Severe   In the past 7 days, how would you rate your pain on average, where 0 means no pain, and 10 means worst imaginable pain? 2 3  3   In general, would you say your health is: 3 2 3 2   In general, would you say your quality of life is: 3 3 3 3   In general, how would you rate your physical health? 2 2 3 3   In general, how would you rate your mental health, including your mood and your ability to think? 2 2 1 1   In general, how would you rate your satisfaction with your social activities and relationships? 2 1 2 1   In general, please rate how well you carry out your usual social activities and roles. (This includes activities at home, at work and in your community, and responsibilities as a parent, child, spouse, employee, friend, etc.) 1 3 2 1   To what extent are you able to carry out your everyday physical activities such as walking, climbing stairs, carrying groceries, or moving a chair? 5 5 4 4   In the past 7 days, how often have you been bothered by emotional problems such as feeling anxious, depressed, or irritable? 4 3 5 4   In the past 7 days, how would you rate your fatigue on average? 3 3 5 4   In the past 7 days, how would you rate your pain on average, where 0 means no pain, and 10 means worst imaginable pain? 2 3 3 3   Global Mental Health Score 9 9 7 7   Global Physical Health Score 14 14 12 13   PROMIS TOTAL - SUBSCORES 23 23 19 20         ASSESSMENT: Current Emotional / Mental Status (status of significant symptoms):   Risk status (Self / Other harm or suicidal ideation)   Patient denies current fears or concerns for personal safety.   Patient reports the following current or recent suicidal ideation or behaviors: pt reports passive SI during session on 10/3/23. Pt denies any active SI and does not have a  plan or intention on harming himself. Pt contracts for safety and agrees to use safety plan if necessary.   Patient denies current or recent homicidal ideation or behaviors.   Patient denies current or recent self injurious behavior or ideation.   Patient denies other safety concerns.   Patient reports there has been no change in risk factors since their last session.     Patient reports there has been no change in protective factors since their last session.     A safety and risk management plan has been developed including: Patient consented to co-developed safety plan on 6/20/23.  Safety and risk management plan was reviewed.   Patient agreed to use safety plan should any safety concerns arise.  A copy was made available to the patient.     Appearance:   Appropriate    Eye Contact:   Good    Psychomotor Behavior: Normal    Attitude:   Cooperative    Orientation:   All   Speech    Rate / Production: Normal     Volume:  Normal    Mood:    Anxious  Anhedonia   Affect:    Blunted  Flat    Thought Content:  Clear    Thought Form:  Coherent  Logical    Insight:    Good      Medication Review:   No changes to current psychiatric medication(s)     Medication Compliance:   Yes     Changes in Health Issues:   None reported     Chemical Use Review:   Substance Use: increase in alcohol , cannabis , and cocaine .  Patient reports frequency of use almost daily for most of the substances listed.  Stage of Change: Preparatory  Reviewed concerns related to health related substance abuse risk  Provided support and affirmation for steps taken towards sobriety    .   Tobacco Use: No current tobacco use.      Diagnosis:  Major Depressive Disorder, Recurrent Episode, Moderate with anxious distress    Collateral Reports Completed:   Not Applicable    PLAN: (Patient Tasks / Therapist Tasks / Other)  Pt is to apply for jobs that he feels he would be a good fit for that have structured hours during the work week.   Pt is to decrease  "substance use and discuss any changes in use next appointment.   Pt is to continue to ponder the question of \"who am I\" and \"what am I about\".      Jeannette Gilliam, Psychotherapist Trainee, [facility]  10/03/23    This note has been reviewed and I agree with the plan of care. This note is co-signed by Valentine King MA, [facility] Supervisor, on: 10/5/2023                                                             ______________________________________________________________________    Individual Treatment Plan    Patient's Name: Godfrey Porras  YOB: 1990    Date of Creation: 6/13/23  Date Treatment Plan Last Reviewed/Revised: 9/19/23    DSM5 Diagnoses: 296.32 (F33.1) Major Depressive Disorder, Recurrent Episode, Moderate With anxious distress  Psychosocial / Contextual Factors: loss of important relationship, stress at work and stress within important roles, death of close friends  PROMIS (reviewed every 90 days): 20    Referral / Collaboration:  Referral to another professional/service is not indicated at this time..    Anticipated number of session for this episode of care: 9-12 sessions  Anticipation frequency of session: Weekly  Anticipated Duration of each session: 38-52 minutes  Treatment plan will be reviewed in 90 days or when goals have been changed.       MeasurableTreatment Goal(s) related to diagnosis / functional impairment(s)  Goal 1: Patient will decrease his depressive symptoms by 50%.    I will know I've met my goal when I can do things I enjoy doing again.      Objective #A    Patient will Decrease frequency and intensity of feeling down, depressed, hopeless.  Status: Continued - Date(s): 9/19/23    Intervention(s)  Therapist will teach emotion regulation skills and coping skills for his depressive symptoms. Pt will be assigned homework related to practicing skills outside of therapy.    Objective #B  Patient will Identify negative self-talk and behaviors: challenge core beliefs, myths, " and actions.   Status: Continued - Date(s): 9/19/23    Intervention(s)  Therapist will teach about thinking patterns and core beliefs/automatic thoughts. Pt will be assigned homework related to recognizing emotions and thinking patterns within himself to discuss during session .    Objective #C  Patient will Decrease thoughts that you'd be better off dead or of suicide / self-harm.   Status: Continued - Date(s): 9/19/23    Intervention(s)  Therapist will teach emotion regulation skills, distress tolerance skills, and thought modification to replace his maladaptive automatic thoughts. Pt will be assigned homework of thought logs and practicing skills outside of therapy .    Objective #D   Patient will use cognitive strategies identified in therapy to challenge anxious thoughts    Status: Continued - Date(s): 9/19/23    Intervention(s)  Therapist will assign homework related to thought challenging outside of the therapy room using emotion regulation skills learned .    Objective #E  Patient will identify 5 values that are important to him and the way he lives his life. Pt will identify ways he can live with those values in mind.    Status: Completed - Date: 9/19/23    Intervention(s)  Therapist will introduce ACT and value identification to pt. Pt will be assigned homework related to behavioral activation.    Objective #F  Patient will talk to at least two others about losses and coping.   Status: Continued - Date(s): 9/19/23    Intervention(s)  Therapist will assign homework related to pt reaching out and building stronger social support in his life. .      Patient has reviewed and agreed to the above plan.      Jeannette Gilliam, Psychotherapist Trainee, [facility]    September 19, 2023  __________________________________________________________________________________       [facility]                                       Godfrey Porras     SAFETY PLAN:  Step 1: Warning signs / cues (Thoughts,  "images, mood, situation, behavior) that a crisis may be developing:  Thoughts: \"People would be better off without me\", \"I'm a burden\", \"I can't do this anymore\" and \"Nothing makes it better\"  Images: flashbacks  Thinking Processes: ruminations (can't stop thinking about my problems): financial concerns and job concerns, racing thoughts, intrusive thoughts (bothersome, unwanted thoughts that come out of nowhere): thoughts of self harm and highly critical and negative thoughts: self judgemental thoughts and self distain  Mood: worsening depression, hopelessness, intense anger and disinhibited (not caring about things or consequences)  Behaviors: isolating/withdrawing , using drugs, using alcohol, aggression, not taking care of myself and not taking care of my responsibilities  Situations: relationship problems and financial stress   Step 2: Coping strategies - Things I can do to take my mind off of my problems without contacting another person (relaxation technique, physical activity):  Distress Tolerance Strategies:  relaxation activities: distraction activities in his home and sensory based activities/self-soothe with five senses: doing something that will taste sour and ground self using 5-4-3-2-1  Physical Activities: go for a walk and deep breathing  Focus on helpful thoughts:  \"This is temporary\", \"I will get through this\", \"Ride the wave\" and remind myself of what is important to me: family, job  Step 3: People and social settings that provide distraction:   Name: best friend Phone: pt has phone number in his phone  movie theater, coffee shop, park, library and gym    Step 4: Remind myself of people and things that are important to me and worth living for:  Family and my best friends.    Step 5: When I am in crisis, I can ask these people to help me use my safety plan:   Name: father Phone: pt has phone number in phone  Step 6: Making the environment safe:   remove drugs, remove access to firearms: call father " to take gun out of house and be around others  Step 7: Professionals or agencies I can contact during a crisis:  Suicide Prevention Lifeline: Call or Text 988   Local Crisis Services: 911, hospital    Call 911 or go to my nearest emergency department.   I helped develop this safety plan and agree to use it when needed.  I have been given a copy of this plan.      Client signature ________Godfrey Porras___________  Today s date:  6/20/2023  Completed by Provider Name/ Credentials:  Jeannette Gilliam, Psychotherapist Trainee, [facility]  June 20, 2023  Adapted from Safety Plan Template 2008 Candy Borges and Mart Yoder is reprinted with the express permission of the authors.  No portion of the Safety Plan Template may be reproduced without the express, written permission.  You can contact the authors at [email or web address] or [email or web address].

## 2023-10-12 ENCOUNTER — VIRTUAL VISIT (OUTPATIENT)
Dept: PSYCHOLOGY | Facility: CLINIC | Age: 33
End: 2023-10-12
Payer: COMMERCIAL

## 2023-10-12 DIAGNOSIS — F33.1 MAJOR DEPRESSIVE DISORDER, RECURRENT EPISODE, MODERATE WITH ANXIOUS DISTRESS (H): Primary | ICD-10-CM

## 2023-10-12 PROCEDURE — 90832 PSYTX W PT 30 MINUTES: CPT | Mod: 95

## 2023-10-12 NOTE — PROGRESS NOTES
[facility]                                     Progress Note    Patient Name: Godfrey Porras  Date: 10/12/23           Service Type: Individual      Session Start Time: 11:02am Session End Time: 11:35am     Session Length: 33 minutes    Session #: 13    Attendees: Client attended alone    Service Modality:  Video Visit:      Provider verified identity through the following two step process.  Patient provided:  Patient is known previously to provider     Telemedicine Visit: The patient's condition can be safely assessed and treated via synchronous audio and visual telemedicine encounter.       Reason for Telemedicine Visit: Patient has requested telehealth visit and Patient convenience (e.g. access to timely appointments / distance to available provider)     Originating Site (Patient Location): Patient's home     Distant Site (Provider Location): [facility] [place]     Consent:  The patient/guardian has verbally consented to: the potential risks and benefits of telemedicine (video visit) versus in person care; bill my insurance or make self-payment for services provided; and responsibility for payment of non-covered services.      Patient would like the video invitation sent by:  My Chart     Mode of Communication:  Video Conference via [employer]     Distant Location (Provider):  On-site     As the provider I attest to compliance with applicable laws and regulations related to telemedicine.    DATA  Extended Session (53+ minutes): No  Interactive Complexity: No  Crisis: No        Progress Since Last Session (Related to Symptoms / Goals / Homework):   Symptoms: Worsening depression and anxiety since last appointment    Homework: Achieved / completed to satisfaction      Episode of Care Goals: Satisfactory progress - ACTION (Actively working towards change); Intervened by reinforcing change plan / affirming steps taken     Current / Ongoing Stressors  "and Concerns:   Pt reports ongoing stressors as work, low motivation, and getting an infection in his jaw over the last week. Pt reports mood since last session has been \"down\" as he feels that his physical healing is \"heading backward\" which is causing feelings of hopelessness and significant physical pain. Pt reports he got an antibiotic for his jaw that \"is not helping\" and is going to go back to the doctor to advocate for his needs. Pt and provider discussed his worsening depression and passive SI as a result of the infection in his jaw and pt was able to identify emotions contributing to his mood. Pt appears to have gained insight into his need to advocate for himself and what he needs to do to challenge the anxious thoughts he has related to his health right now. Pt appears to be working toward allowing himself to stay in the present moment and focus on what is going on now, and challenging any \"worst case scenario\" thoughts he is having about the future using evidence from right now. Pt denies any active SI, SIB, or HI since last session. Pt and provider reviewed safety plan and pt agrees to use safety plan should he experience a mental health crisis before next appointment. Pt believes that he will be able to keep himself safe and reminded provider that he removed all fire arms from his home. Provider sent pt a copy of his safety plan via [employer] message so pt has access to it in the future if needed. See below for plan of care until next appointment. Pt asked to have a shorter session so he can go to the doctor to get his jaw and infection checked out since it has not gotten any better with the use of antibiotics.     Treatment Objective(s) Addressed in This Session:   identify three distraction and diversion activities and use those activities to decrease level of anxiety    Increase interest, engagement, and pleasure in doing things  Feel less tired and more energy during the day   Identify negative " self-talk and behaviors: challenge core beliefs, myths, and actions  Improve concentration, focus, and mindfulness in daily activities   Decrease thoughts that you'd be better off dead or of suicide / self-harm  identify 3 positives concerning self-esteem each session of therapy     Intervention:   CBT: discussion of thoughts related to depression and anxiety, behaviors that are contributing to depressive thoughts, discussion of thinking/behavior related patterns, discussion of emotions that trigger maladaptive thinking patterns and can lead into increased passive SI.  Solution Focused: Review of safety plan and barriers to using support network, immediate solution focused discussion related to his physical health and reaching out to medical doctor to help manage his physical health concerns    Assessments completed prior to visit:  The following assessments were completed by patient for this visit:  PROMIS 10-Global Health (all questions and answers displayed):       3/29/2022     9:02 AM 8/8/2022     9:50 AM 5/31/2023    10:16 AM 6/7/2023     1:18 PM   PROMIS 10   In general, would you say your health is: Good Fair  Fair   In general, would you say your quality of life is: Good Good  Good   In general, how would you rate your physical health? Fair Fair  Good   In general, how would you rate your mental health, including your mood and your ability to think? Fair Fair  Poor   In general, how would you rate your satisfaction with your social activities and relationships? Fair Poor  Poor   In general, please rate how well you carry out your usual social activities and roles Poor Good  Poor   To what extent are you able to carry out your everyday physical activities such as walking, climbing stairs, carrying groceries, or moving a chair? Completely Completely  Mostly   In the past 7 days, how often have you been bothered by emotional problems such as feeling anxious, depressed, or irritable? Often Sometimes  Often   In  the past 7 days, how would you rate your fatigue on average? Moderate Moderate  Severe   In the past 7 days, how would you rate your pain on average, where 0 means no pain, and 10 means worst imaginable pain? 2 3  3   In general, would you say your health is: 3 2 3 2   In general, would you say your quality of life is: 3 3 3 3   In general, how would you rate your physical health? 2 2 3 3   In general, how would you rate your mental health, including your mood and your ability to think? 2 2 1 1   In general, how would you rate your satisfaction with your social activities and relationships? 2 1 2 1   In general, please rate how well you carry out your usual social activities and roles. (This includes activities at home, at work and in your community, and responsibilities as a parent, child, spouse, employee, friend, etc.) 1 3 2 1   To what extent are you able to carry out your everyday physical activities such as walking, climbing stairs, carrying groceries, or moving a chair? 5 5 4 4   In the past 7 days, how often have you been bothered by emotional problems such as feeling anxious, depressed, or irritable? 4 3 5 4   In the past 7 days, how would you rate your fatigue on average? 3 3 5 4   In the past 7 days, how would you rate your pain on average, where 0 means no pain, and 10 means worst imaginable pain? 2 3 3 3   Global Mental Health Score 9 9 7 7   Global Physical Health Score 14 14 12 13   PROMIS TOTAL - SUBSCORES 23 23 19 20         ASSESSMENT: Current Emotional / Mental Status (status of significant symptoms):   Risk status (Self / Other harm or suicidal ideation)   Patient denies current fears or concerns for personal safety.   Patient reports the following current or recent suicidal ideation or behaviors: pt reports passive SI during session on 10/12/23 Pt denies any active SI and does not have a plan or intention on harming himself. Pt contracts for safety and agrees to use safety plan if  necessary.   Patient denies current or recent homicidal ideation or behaviors.   Patient denies current or recent self injurious behavior or ideation.   Patient denies other safety concerns.   Patient reports there has been no change in risk factors since their last session.     Patient reports there has been no change in protective factors since their last session.     A safety and risk management plan has been developed including: Patient consented to co-developed safety plan on 6/20/23.  Safety and risk management plan was reviewed.   Patient agreed to use safety plan should any safety concerns arise.  A copy was made available to the patient.     Appearance:   Appropriate    Eye Contact:   Good    Psychomotor Behavior: Normal    Attitude:   Cooperative    Orientation:   All   Speech    Rate / Production: Normal     Volume:  Normal    Mood:    Anxious  Anhedonia   Affect:    Blunted  Flat    Thought Content:  Clear    Thought Form:  Coherent  Logical    Insight:    Good      Medication Review:   No changes to current psychiatric medication(s)     Medication Compliance:   Yes     Changes in Health Issues:   Yes: Pain, Associated Psychological Distress     Chemical Use Review:   Substance Use: Problem use continues with no change since last session, Stage of Change: Contemplation  Reviewed concerns related to health related substance abuse risk  Provided support and affirmation for steps taken towards sobriety    .   Tobacco Use: No current tobacco use.      Diagnosis:  Major Depressive Disorder, Recurrent Episode, Moderate with anxious distress    Collateral Reports Completed:   Not Applicable    PLAN: (Patient Tasks / Therapist Tasks / Other)  Pt is to continue to apply for jobs and set up meetings to move his plan for applying to grad school forward.   Pt is to take care of his physical health and follow up with medical staff as needed for his jaw.   Pt is to challenge his anxious thoughts related to his  physical health and focus on the present moment.   Pt is to follow safety plan as needed.       Jeannette Gilliam, Psychotherapist Trainee, [facility]  10/12/23    This note has been reviewed and I agree with the plan of care. This note is co-signed by Valentine King MA, [facility] Supervisor, on: 10/13/2023                                                               ______________________________________________________________________    Individual Treatment Plan    Patient's Name: Godfrey Porras  YOB: 1990    Date of Creation: 6/13/23  Date Treatment Plan Last Reviewed/Revised: 9/19/23    DSM5 Diagnoses: 296.32 (F33.1) Major Depressive Disorder, Recurrent Episode, Moderate With anxious distress  Psychosocial / Contextual Factors: loss of important relationship, stress at work and stress within important roles, death of close friends  PROMIS (reviewed every 90 days): 20    Referral / Collaboration:  Referral to another professional/service is not indicated at this time..    Anticipated number of session for this episode of care: 9-12 sessions  Anticipation frequency of session: Weekly  Anticipated Duration of each session: 38-52 minutes  Treatment plan will be reviewed in 90 days or when goals have been changed.       MeasurableTreatment Goal(s) related to diagnosis / functional impairment(s)  Goal 1: Patient will decrease his depressive symptoms by 50%.    I will know I've met my goal when I can do things I enjoy doing again.      Objective #A    Patient will Decrease frequency and intensity of feeling down, depressed, hopeless.  Status: Continued - Date(s): 9/19/23    Intervention(s)  Therapist will teach emotion regulation skills and coping skills for his depressive symptoms. Pt will be assigned homework related to practicing skills outside of therapy.    Objective #B  Patient will Identify negative self-talk and behaviors: challenge core beliefs, myths, and actions.   Status: Continued - Date(s):  9/19/23    Intervention(s)  Therapist will teach about thinking patterns and core beliefs/automatic thoughts. Pt will be assigned homework related to recognizing emotions and thinking patterns within himself to discuss during session .    Objective #C  Patient will Decrease thoughts that you'd be better off dead or of suicide / self-harm.   Status: Continued - Date(s): 9/19/23    Intervention(s)  Therapist will teach emotion regulation skills, distress tolerance skills, and thought modification to replace his maladaptive automatic thoughts. Pt will be assigned homework of thought logs and practicing skills outside of therapy .    Objective #D   Patient will use cognitive strategies identified in therapy to challenge anxious thoughts    Status: Continued - Date(s): 9/19/23    Intervention(s)  Therapist will assign homework related to thought challenging outside of the therapy room using emotion regulation skills learned .    Objective #E  Patient will identify 5 values that are important to him and the way he lives his life. Pt will identify ways he can live with those values in mind.    Status: Completed - Date: 9/19/23    Intervention(s)  Therapist will introduce ACT and value identification to pt. Pt will be assigned homework related to behavioral activation.    Objective #F  Patient will talk to at least two others about losses and coping.   Status: Continued - Date(s): 9/19/23    Intervention(s)  Therapist will assign homework related to pt reaching out and building stronger social support in his life. .      Patient has reviewed and agreed to the above plan.      Jeannette Gilliam, Psychotherapist Trainee, [facility]    September 19, 2023  __________________________________________________________________________________       [facility]                                       Godfrey Porras     SAFETY PLAN:  Step 1: Warning signs / cues (Thoughts, images, mood, situation, behavior) that a crisis  "may be developing:  Thoughts: \"People would be better off without me\", \"I'm a burden\", \"I can't do this anymore\" and \"Nothing makes it better\"  Images: flashbacks  Thinking Processes: ruminations (can't stop thinking about my problems): financial concerns and job concerns, racing thoughts, intrusive thoughts (bothersome, unwanted thoughts that come out of nowhere): thoughts of self harm and highly critical and negative thoughts: self judgemental thoughts and self distain  Mood: worsening depression, hopelessness, intense anger and disinhibited (not caring about things or consequences)  Behaviors: isolating/withdrawing , using drugs, using alcohol, aggression, not taking care of myself and not taking care of my responsibilities  Situations: relationship problems and financial stress   Step 2: Coping strategies - Things I can do to take my mind off of my problems without contacting another person (relaxation technique, physical activity):  Distress Tolerance Strategies:  relaxation activities: distraction activities in his home and sensory based activities/self-soothe with five senses: doing something that will taste sour and ground self using 5-4-3-2-1  Physical Activities: go for a walk and deep breathing  Focus on helpful thoughts:  \"This is temporary\", \"I will get through this\", \"Ride the wave\" and remind myself of what is important to me: family, job  Step 3: People and social settings that provide distraction:   Name: best friend Phone: pt has phone number in his phone  movie theater, coffee shop, park, library and gym    Step 4: Remind myself of people and things that are important to me and worth living for:  Family and my best friends.    Step 5: When I am in crisis, I can ask these people to help me use my safety plan:   Name: father Phone: pt has phone number in phone  Step 6: Making the environment safe:   remove drugs, remove access to firearms: call father to take gun out of house and be around " others  Step 7: Professionals or agencies I can contact during a crisis:  Suicide Prevention Lifeline: Call or Text 988   Local Crisis Services: 911, hospital    Call 911 or go to my nearest emergency department.   I helped develop this safety plan and agree to use it when needed.  I have been given a copy of this plan.      Client signature ________Godfrey Porras___________  Today s date:  6/20/2023  Completed by Provider Name/ Credentials:  Jeannette Gilliam, Psychotherapist Trainee, [facility]  June 20, 2023  Adapted from Safety Plan Template 2008 Candy Borges and Mart Yoder is reprinted with the express permission of the authors.  No portion of the Safety Plan Template may be reproduced without the express, written permission.  You can contact the authors at [email or web address] or [email or web address].

## 2023-10-13 DIAGNOSIS — S02.652S: Primary | ICD-10-CM

## 2023-10-13 NOTE — H&P
"ORAL & MAXILLOFACIAL SURGERY H&P    Chief Concern:    \"my jaw was doing fine on Thursday when the screws were removed and then on Monday the pain and swelling came back\"    History of Present Illness:  Pt is a 33M presenting for evaluation of returning jaw pain/ discomfort and swelling. Pt was take to OR on 8/19/2023 for ORIF of left mandibular angle fracture and right subcondylar fracture.  The patient reports a history adhering to all the guidelines and not pushing it. Pt had 4 IMF screws removed on Thursday 10/5/2023 and was placed on Azithromycin 250 mg. Pt returned to clinic on 10/10/2023 due to returning pain and swelling of the left jaw/ midface. The patient currently denies fevers, chills, dyspnea, dysphagia, foul taste, swelling, dysesthesia, or paresthesia.    ALL: Penicillin allergy- unknown reaction, occurred as a child, has not had it since    MEDs: Vivace 20 mg 3x/ week, Albuterol 1x daily, Flovent    Past Medical Hx: Asthma, ADHD    Past Surgical Hx: oral surgery for mandibular fracture, left hand surgery (pins in metacarpals). No complications with any surgeries.    Social Hx: tobacco use: use to smoke cigarettes, quit in 2012. Smoked for 5 years, ~0.5 PPD / etOH use: 2-3 drinks 3x/ week / recreational drug use: medical marijuana daily    Family Hx: Heart issues on material side of family (grandfather had multiple MIs)    Patient denies fever, chills, night sweats, headaches, chest pain, SOB, nausea, vomiting, odynophagia, changes to vision, or hearing. Patient controlling saliva, denies floor of mouth elevation, trouble breathing, or constriction of airway. No changes to patient s voice.    Objective Physical Exam:  General: pt seated upright and breathing room air comfortably. Pt appears well perfused and in NAD    EXTRAORAL: NC/AT, EOMI, Moderate extraoral tenderness and swelling over left mandibular angle. Facial asymmetry present, guarding present, no TMJ pain, deviation. Angle and inferior " border of mandible palpable bilaterally. CN 5+7 intact bilaterally. AAOx4    INTRAORAL: Labial and buccal mucosal were inspected and palpated. Dorsal, ventral, and lateral surfaces of the tongue were visualized. Tongue is moist, pink, without ulceration or discoloration and in normal anatomic position.  FOM nt/nd. Uvula is midline with no lateral pharyngeal swelling.  Gingiva coral pink. Adult dentition in fair condition, dentition grossly intact, no grossly mobile teeth. Left buccal mucosa present with moderate edema and tenderness to palpation. TASHA: 20 mm with guarding present, Mallampati: II with normal neck mobility.     ROS: negative unless otherwise noted in exam.   Heart: auscultated, normal S1/S2, RRR, pt is warm and well perfused  Lungs: lungs clear to auscultation bilaterally, equal chest rise on exam     Radiographic Imaging:  OPG obtained 8/28/23. Images located in [place]  CBCT obtained 10/10/23. Image in [place]    Radiographic Findings:   PANO: unable to visualize right mandibular condyle, left condyle seated in articular eminence, maxillary sinuses patent bilaterally, normal bony trabeculation patterns, inferior border of mandible intact with exception of left mandibular angle fracture that extends form left angle through impacted #17 and to superior border, no generalized horizontal bone loss, heavily restored dentition, teeth missing - none ; noted radiolucencies around crowns of #17 and 32. CAPRI canal comes in proximity with tooth roots of #17 and 32 without loss of cortication, deflection, or shadowing of roots. No carotid calcifications noted. No other pathologies appreciated.     Assessment:   33M presenting for evaluation of returning jaw pain/ discomfort and swelling. Pt was take to OR on 8/19/2023 for ORIF of left mandibular angle fracture and right subcondylar fracture. Pt presents 8 weeks later with increased swelling, trismus and increasing pain likely due to non union of left mandibular  angle fracture.    Plan:   - plan for extraction of #17 with ORIF of left mandibular angle fracture in OR setting  - Tentative plan for OR on Monday 10/16/2023  - pt encouraged to complete Azithromycin 250 mg anx course and a Rx of Peridex was sent today in preparation for surgery Monday.  - we have discussed the risk, benefits, and alternatives of this procedure including, but not limited to bleeding, bruising, infection, swelling, pain, discomfort, osteomyelitis, fracture, injury to nerves, vessels, teeth, and soft tissue, permanent facial numbness and weakness including permanent lip and chin numbness, permanent tongue numbness, dry socket, need for another procedure, and/or progression of temporomandibular joint symptoms/pain.  - all questions invited and answered      Brian Hadfield, DMD  OMFS, PGY-1    Findings discussed with Chief resident  Dr. RADHA Kenny and Staff Dr. Osorio

## 2023-10-14 NOTE — PRE-PROCEDURE
Oral and Maxillofacial Surgery  Pre-Op Note    Surgery Date: 10/16/2023     Pre-op Dx: Reason:     * Open fracture of left mandibular angle, sequela (H24) [S02.652S]     Planned Procedure: Open reduction internal fixation of left mandibular angle fracture via intraoral and or extraoral approaches, extraction of tooth #17, and other procedures as indicated.:      Planned Anesthesia: GA w/ ETT    Allergies: Penicillins    Pre-Operative Medications: Per anesthesia    Resident Surgeon:   Carlos Kenny DDS & Stefanie Remy DDS     Staff Surgeon:   Hipolito Louis DDS       Risks and Complications discussed with patient/guardian/parents to include, but not limited to bleeding,  infection, swelling, pain, temporary/permanent hypoesthesia/paresthesia CN V3 mental  Nerve as well as CN VII/facial nerve distribution, failure of treatment, need for additional  treatment/procedures. Consent will be written. All questions were answered.    Norma Peterson DDS  OMFS PGY-2

## 2023-10-15 ENCOUNTER — ANESTHESIA EVENT (OUTPATIENT)
Dept: SURGERY | Facility: CLINIC | Age: 33
End: 2023-10-15
Payer: COMMERCIAL

## 2023-10-16 ENCOUNTER — HOSPITAL ENCOUNTER (OUTPATIENT)
Facility: CLINIC | Age: 33
Setting detail: OBSERVATION
Discharge: HOME | End: 2023-10-17
Attending: DENTIST | Admitting: DENTIST
Payer: COMMERCIAL

## 2023-10-16 ENCOUNTER — ANESTHESIA (OUTPATIENT)
Dept: SURGERY | Facility: CLINIC | Age: 33
End: 2023-10-16
Payer: COMMERCIAL

## 2023-10-16 DIAGNOSIS — S02.652S: Primary | ICD-10-CM

## 2023-10-16 PROBLEM — S02.609A MANDIBLE FRACTURE (H): Status: ACTIVE | Noted: 2023-10-16

## 2023-10-16 PROCEDURE — 360N000077 HC SURGERY LEVEL 4, PER MIN: Performed by: DENTIST

## 2023-10-16 PROCEDURE — 250N000025 HC SEVOFLURANE, PER MIN: Performed by: DENTIST

## 2023-10-16 PROCEDURE — C1713 ANCHOR/SCREW BN/BN,TIS/BN: HCPCS | Performed by: DENTIST

## 2023-10-16 PROCEDURE — 250N000013 HC RX MED GY IP 250 OP 250 PS 637: Performed by: DENTIST

## 2023-10-16 PROCEDURE — 250N000009 HC RX 250: Performed by: NURSE ANESTHETIST, CERTIFIED REGISTERED

## 2023-10-16 PROCEDURE — G0378 HOSPITAL OBSERVATION PER HR: HCPCS

## 2023-10-16 PROCEDURE — 272N000001 HC OR GENERAL SUPPLY STERILE: Performed by: DENTIST

## 2023-10-16 PROCEDURE — 250N000013 HC RX MED GY IP 250 OP 250 PS 637: Performed by: STUDENT IN AN ORGANIZED HEALTH CARE EDUCATION/TRAINING PROGRAM

## 2023-10-16 PROCEDURE — 250N000011 HC RX IP 250 OP 636: Mod: JZ

## 2023-10-16 PROCEDURE — 250N000009 HC RX 250: Performed by: DENTIST

## 2023-10-16 PROCEDURE — 250N000011 HC RX IP 250 OP 636: Performed by: ANESTHESIOLOGY

## 2023-10-16 PROCEDURE — 258N000003 HC RX IP 258 OP 636: Performed by: NURSE ANESTHETIST, CERTIFIED REGISTERED

## 2023-10-16 PROCEDURE — 250N000013 HC RX MED GY IP 250 OP 250 PS 637

## 2023-10-16 PROCEDURE — 710N000010 HC RECOVERY PHASE 1, LEVEL 2, PER MIN: Performed by: DENTIST

## 2023-10-16 PROCEDURE — 370N000017 HC ANESTHESIA TECHNICAL FEE, PER MIN: Performed by: DENTIST

## 2023-10-16 PROCEDURE — 250N000011 HC RX IP 250 OP 636: Performed by: NURSE ANESTHETIST, CERTIFIED REGISTERED

## 2023-10-16 PROCEDURE — 999N000141 HC STATISTIC PRE-PROCEDURE NURSING ASSESSMENT: Performed by: DENTIST

## 2023-10-16 DEVICE — IMP SCR SYN MATRIX 2.0X06MM SELF TAP  04.503.406.01: Type: IMPLANTABLE DEVICE | Site: MANDIBLE | Status: FUNCTIONAL

## 2023-10-16 DEVICE — IMPLANTABLE DEVICE: Type: IMPLANTABLE DEVICE | Site: MANDIBLE | Status: FUNCTIONAL

## 2023-10-16 RX ORDER — NALOXONE HYDROCHLORIDE 0.4 MG/ML
0.4 INJECTION, SOLUTION INTRAMUSCULAR; INTRAVENOUS; SUBCUTANEOUS
Status: DISCONTINUED | OUTPATIENT
Start: 2023-10-16 | End: 2023-10-17 | Stop reason: HOSPADM

## 2023-10-16 RX ORDER — SODIUM CHLORIDE, SODIUM LACTATE, POTASSIUM CHLORIDE, CALCIUM CHLORIDE 600; 310; 30; 20 MG/100ML; MG/100ML; MG/100ML; MG/100ML
INJECTION, SOLUTION INTRAVENOUS CONTINUOUS
Status: DISCONTINUED | OUTPATIENT
Start: 2023-10-16 | End: 2023-10-16 | Stop reason: HOSPADM

## 2023-10-16 RX ORDER — ACETAMINOPHEN 325 MG/1
975 TABLET ORAL ONCE
Status: DISCONTINUED | OUTPATIENT
Start: 2023-10-16 | End: 2023-10-16 | Stop reason: HOSPADM

## 2023-10-16 RX ORDER — DIMENHYDRINATE 50 MG/ML
25 INJECTION, SOLUTION INTRAMUSCULAR; INTRAVENOUS
Status: DISCONTINUED | OUTPATIENT
Start: 2023-10-16 | End: 2023-10-16 | Stop reason: HOSPADM

## 2023-10-16 RX ORDER — LABETALOL HYDROCHLORIDE 5 MG/ML
10 INJECTION, SOLUTION INTRAVENOUS
Status: DISCONTINUED | OUTPATIENT
Start: 2023-10-16 | End: 2023-10-17 | Stop reason: HOSPADM

## 2023-10-16 RX ORDER — OXYCODONE HYDROCHLORIDE 5 MG/1
5 TABLET ORAL EVERY 4 HOURS PRN
Status: DISCONTINUED | OUTPATIENT
Start: 2023-10-16 | End: 2023-10-17 | Stop reason: HOSPADM

## 2023-10-16 RX ORDER — IBUPROFEN 600 MG/1
600 TABLET, FILM COATED ORAL EVERY 6 HOURS PRN
Status: DISCONTINUED | OUTPATIENT
Start: 2023-10-16 | End: 2023-10-16

## 2023-10-16 RX ORDER — HYDROMORPHONE HCL IN WATER/PF 6 MG/30 ML
0.2 PATIENT CONTROLLED ANALGESIA SYRINGE INTRAVENOUS EVERY 5 MIN PRN
Status: DISCONTINUED | OUTPATIENT
Start: 2023-10-16 | End: 2023-10-16 | Stop reason: HOSPADM

## 2023-10-16 RX ORDER — CHLORHEXIDINE GLUCONATE ORAL RINSE 1.2 MG/ML
15 SOLUTION DENTAL 2 TIMES DAILY
Status: DISCONTINUED | OUTPATIENT
Start: 2023-10-16 | End: 2023-10-17 | Stop reason: HOSPADM

## 2023-10-16 RX ORDER — CHLORHEXIDINE GLUCONATE ORAL RINSE 1.2 MG/ML
SOLUTION DENTAL PRN
Status: DISCONTINUED | OUTPATIENT
Start: 2023-10-16 | End: 2023-10-16 | Stop reason: HOSPADM

## 2023-10-16 RX ORDER — SODIUM CHLORIDE, SODIUM LACTATE, POTASSIUM CHLORIDE, CALCIUM CHLORIDE 600; 310; 30; 20 MG/100ML; MG/100ML; MG/100ML; MG/100ML
INJECTION, SOLUTION INTRAVENOUS CONTINUOUS PRN
Status: DISCONTINUED | OUTPATIENT
Start: 2023-10-16 | End: 2023-10-16

## 2023-10-16 RX ORDER — ONDANSETRON 4 MG/1
4 TABLET, ORALLY DISINTEGRATING ORAL EVERY 6 HOURS PRN
Status: DISCONTINUED | OUTPATIENT
Start: 2023-10-16 | End: 2023-10-17 | Stop reason: HOSPADM

## 2023-10-16 RX ORDER — ACETAMINOPHEN 325 MG/10.15ML
650 LIQUID ORAL EVERY 6 HOURS
Status: CANCELLED | OUTPATIENT
Start: 2023-10-16

## 2023-10-16 RX ORDER — OXYCODONE HCL 5 MG/5 ML
5 SOLUTION, ORAL ORAL EVERY 4 HOURS PRN
Status: CANCELLED | OUTPATIENT
Start: 2023-10-16

## 2023-10-16 RX ORDER — OXYCODONE HCL 5 MG/5 ML
5 SOLUTION, ORAL ORAL
Qty: 60 ML | Refills: 0 | Status: CANCELLED | OUTPATIENT
Start: 2023-10-16

## 2023-10-16 RX ORDER — OXYCODONE HYDROCHLORIDE 5 MG/1
5 TABLET ORAL
Status: DISCONTINUED | OUTPATIENT
Start: 2023-10-16 | End: 2023-10-16 | Stop reason: HOSPADM

## 2023-10-16 RX ORDER — PROPOFOL 10 MG/ML
INJECTION, EMULSION INTRAVENOUS PRN
Status: DISCONTINUED | OUTPATIENT
Start: 2023-10-16 | End: 2023-10-16

## 2023-10-16 RX ORDER — KETOROLAC TROMETHAMINE 30 MG/ML
30 INJECTION, SOLUTION INTRAMUSCULAR; INTRAVENOUS EVERY 6 HOURS
Status: CANCELLED | OUTPATIENT
Start: 2023-10-16 | End: 2023-10-18

## 2023-10-16 RX ORDER — LIDOCAINE HYDROCHLORIDE 20 MG/ML
INJECTION, SOLUTION INFILTRATION; PERINEURAL PRN
Status: DISCONTINUED | OUTPATIENT
Start: 2023-10-16 | End: 2023-10-16

## 2023-10-16 RX ORDER — KETOROLAC TROMETHAMINE 30 MG/ML
INJECTION, SOLUTION INTRAMUSCULAR; INTRAVENOUS PRN
Status: DISCONTINUED | OUTPATIENT
Start: 2023-10-16 | End: 2023-10-16

## 2023-10-16 RX ORDER — IBUPROFEN 100 MG/5ML
600 SUSPENSION, ORAL (FINAL DOSE FORM) ORAL EVERY 6 HOURS PRN
Status: CANCELLED | OUTPATIENT
Start: 2023-10-16

## 2023-10-16 RX ORDER — CHLORHEXIDINE GLUCONATE ORAL RINSE 1.2 MG/ML
10 SOLUTION DENTAL ONCE
Status: COMPLETED | OUTPATIENT
Start: 2023-10-16 | End: 2023-10-16

## 2023-10-16 RX ORDER — SODIUM CHLORIDE 9 MG/ML
INJECTION, SOLUTION INTRAVENOUS CONTINUOUS
Status: DISCONTINUED | OUTPATIENT
Start: 2023-10-16 | End: 2023-10-17 | Stop reason: HOSPADM

## 2023-10-16 RX ORDER — OXYCODONE HYDROCHLORIDE 10 MG/1
10 TABLET ORAL
Status: DISCONTINUED | OUTPATIENT
Start: 2023-10-16 | End: 2023-10-16 | Stop reason: HOSPADM

## 2023-10-16 RX ORDER — NALOXONE HYDROCHLORIDE 0.4 MG/ML
0.2 INJECTION, SOLUTION INTRAMUSCULAR; INTRAVENOUS; SUBCUTANEOUS
Status: DISCONTINUED | OUTPATIENT
Start: 2023-10-16 | End: 2023-10-17 | Stop reason: HOSPADM

## 2023-10-16 RX ORDER — ACETAMINOPHEN 325 MG/1
650 TABLET ORAL EVERY 6 HOURS
Status: DISCONTINUED | OUTPATIENT
Start: 2023-10-16 | End: 2023-10-17 | Stop reason: HOSPADM

## 2023-10-16 RX ORDER — KETOROLAC TROMETHAMINE 30 MG/ML
30 INJECTION, SOLUTION INTRAMUSCULAR; INTRAVENOUS ONCE
Status: CANCELLED | OUTPATIENT
Start: 2023-10-16 | End: 2023-10-16

## 2023-10-16 RX ORDER — IBUPROFEN 100 MG/5ML
600 SUSPENSION, ORAL (FINAL DOSE FORM) ORAL EVERY 6 HOURS PRN
Qty: 473 ML | Refills: 0 | Status: CANCELLED | OUTPATIENT
Start: 2023-10-16

## 2023-10-16 RX ORDER — CHLORHEXIDINE GLUCONATE ORAL RINSE 1.2 MG/ML
SOLUTION DENTAL
Qty: 473 ML | Refills: 0 | Status: CANCELLED | OUTPATIENT
Start: 2023-10-16

## 2023-10-16 RX ORDER — CLINDAMYCIN PHOSPHATE 900 MG/50ML
900 INJECTION, SOLUTION INTRAVENOUS
Status: COMPLETED | OUTPATIENT
Start: 2023-10-16 | End: 2023-10-16

## 2023-10-16 RX ORDER — FENTANYL CITRATE 50 UG/ML
25 INJECTION, SOLUTION INTRAMUSCULAR; INTRAVENOUS EVERY 5 MIN PRN
Status: DISCONTINUED | OUTPATIENT
Start: 2023-10-16 | End: 2023-10-16 | Stop reason: HOSPADM

## 2023-10-16 RX ORDER — ONDANSETRON 2 MG/ML
INJECTION INTRAMUSCULAR; INTRAVENOUS PRN
Status: DISCONTINUED | OUTPATIENT
Start: 2023-10-16 | End: 2023-10-16

## 2023-10-16 RX ORDER — BUPIVACAINE HYDROCHLORIDE AND EPINEPHRINE 2.5; 5 MG/ML; UG/ML
INJECTION, SOLUTION INFILTRATION; PERINEURAL PRN
Status: DISCONTINUED | OUTPATIENT
Start: 2023-10-16 | End: 2023-10-16 | Stop reason: HOSPADM

## 2023-10-16 RX ORDER — HYDROMORPHONE HCL IN WATER/PF 6 MG/30 ML
0.4 PATIENT CONTROLLED ANALGESIA SYRINGE INTRAVENOUS EVERY 5 MIN PRN
Status: DISCONTINUED | OUTPATIENT
Start: 2023-10-16 | End: 2023-10-16 | Stop reason: HOSPADM

## 2023-10-16 RX ORDER — IBUPROFEN 600 MG/1
600 TABLET, FILM COATED ORAL EVERY 6 HOURS
Status: DISCONTINUED | OUTPATIENT
Start: 2023-10-16 | End: 2023-10-17 | Stop reason: HOSPADM

## 2023-10-16 RX ORDER — CLINDAMYCIN PHOSPHATE 900 MG/50ML
900 INJECTION, SOLUTION INTRAVENOUS SEE ADMIN INSTRUCTIONS
Status: DISCONTINUED | OUTPATIENT
Start: 2023-10-16 | End: 2023-10-16 | Stop reason: HOSPADM

## 2023-10-16 RX ORDER — FENTANYL CITRATE 50 UG/ML
50 INJECTION, SOLUTION INTRAMUSCULAR; INTRAVENOUS EVERY 5 MIN PRN
Status: DISCONTINUED | OUTPATIENT
Start: 2023-10-16 | End: 2023-10-16 | Stop reason: HOSPADM

## 2023-10-16 RX ORDER — ONDANSETRON 2 MG/ML
4 INJECTION INTRAMUSCULAR; INTRAVENOUS EVERY 6 HOURS PRN
Status: DISCONTINUED | OUTPATIENT
Start: 2023-10-16 | End: 2023-10-17 | Stop reason: HOSPADM

## 2023-10-16 RX ORDER — CLINDAMYCIN PALMITATE HYDROCHLORIDE 75 MG/5ML
300 SOLUTION ORAL 3 TIMES DAILY
Qty: 420 ML | Refills: 0 | Status: CANCELLED | OUTPATIENT
Start: 2023-10-16 | End: 2023-10-23

## 2023-10-16 RX ORDER — CLINDAMYCIN HCL 300 MG
300 CAPSULE ORAL EVERY 6 HOURS SCHEDULED
Status: DISCONTINUED | OUTPATIENT
Start: 2023-10-16 | End: 2023-10-17 | Stop reason: HOSPADM

## 2023-10-16 RX ORDER — DEXAMETHASONE SODIUM PHOSPHATE 4 MG/ML
INJECTION, SOLUTION INTRA-ARTICULAR; INTRALESIONAL; INTRAMUSCULAR; INTRAVENOUS; SOFT TISSUE PRN
Status: DISCONTINUED | OUTPATIENT
Start: 2023-10-16 | End: 2023-10-16

## 2023-10-16 RX ORDER — FENTANYL CITRATE 50 UG/ML
INJECTION, SOLUTION INTRAMUSCULAR; INTRAVENOUS PRN
Status: DISCONTINUED | OUTPATIENT
Start: 2023-10-16 | End: 2023-10-16

## 2023-10-16 RX ADMIN — FENTANYL CITRATE 25 MCG: 50 INJECTION, SOLUTION INTRAMUSCULAR; INTRAVENOUS at 14:46

## 2023-10-16 RX ADMIN — IBUPROFEN 600 MG: 600 TABLET, FILM COATED ORAL at 18:33

## 2023-10-16 RX ADMIN — FENTANYL CITRATE 100 MCG: 50 INJECTION INTRAMUSCULAR; INTRAVENOUS at 11:24

## 2023-10-16 RX ADMIN — FENTANYL CITRATE 25 MCG: 50 INJECTION, SOLUTION INTRAMUSCULAR; INTRAVENOUS at 14:20

## 2023-10-16 RX ADMIN — IBUPROFEN 600 MG: 600 TABLET, FILM COATED ORAL at 23:05

## 2023-10-16 RX ADMIN — LIDOCAINE HYDROCHLORIDE 100 MG: 20 INJECTION, SOLUTION INFILTRATION; PERINEURAL at 11:26

## 2023-10-16 RX ADMIN — SODIUM CHLORIDE, POTASSIUM CHLORIDE, SODIUM LACTATE AND CALCIUM CHLORIDE: 600; 310; 30; 20 INJECTION, SOLUTION INTRAVENOUS at 11:19

## 2023-10-16 RX ADMIN — HYDROMORPHONE HYDROCHLORIDE 0.2 MG: 0.2 INJECTION, SOLUTION INTRAMUSCULAR; INTRAVENOUS; SUBCUTANEOUS at 15:50

## 2023-10-16 RX ADMIN — CLINDAMYCIN PHOSPHATE 900 MG: 900 INJECTION, SOLUTION INTRAVENOUS at 11:31

## 2023-10-16 RX ADMIN — DEXAMETHASONE SODIUM PHOSPHATE 10 MG: 4 INJECTION, SOLUTION INTRA-ARTICULAR; INTRALESIONAL; INTRAMUSCULAR; INTRAVENOUS; SOFT TISSUE at 11:51

## 2023-10-16 RX ADMIN — LABETALOL HYDROCHLORIDE 10 MG: 5 INJECTION, SOLUTION INTRAVENOUS at 13:54

## 2023-10-16 RX ADMIN — ACETAMINOPHEN 650 MG: 325 TABLET, FILM COATED ORAL at 18:33

## 2023-10-16 RX ADMIN — CHLORHEXIDINE GLUCONATE 10 ML: 1.2 RINSE ORAL at 11:05

## 2023-10-16 RX ADMIN — REMIFENTANIL HYDROCHLORIDE 0.15 MCG/KG/MIN: 1 INJECTION, POWDER, LYOPHILIZED, FOR SOLUTION INTRAVENOUS at 11:30

## 2023-10-16 RX ADMIN — FENTANYL CITRATE 25 MCG: 50 INJECTION, SOLUTION INTRAMUSCULAR; INTRAVENOUS at 13:59

## 2023-10-16 RX ADMIN — FENTANYL CITRATE 25 MCG: 50 INJECTION, SOLUTION INTRAMUSCULAR; INTRAVENOUS at 14:52

## 2023-10-16 RX ADMIN — FENTANYL CITRATE 25 MCG: 50 INJECTION, SOLUTION INTRAMUSCULAR; INTRAVENOUS at 14:37

## 2023-10-16 RX ADMIN — PROPOFOL 200 MG: 10 INJECTION, EMULSION INTRAVENOUS at 11:26

## 2023-10-16 RX ADMIN — SUCCINYLCHOLINE CHLORIDE 140 MG: 20 INJECTION, SOLUTION INTRAMUSCULAR; INTRAVENOUS; PARENTERAL at 11:27

## 2023-10-16 RX ADMIN — KETOROLAC TROMETHAMINE 30 MG: 30 INJECTION, SOLUTION INTRAMUSCULAR at 12:52

## 2023-10-16 RX ADMIN — CHLORHEXIDINE GLUCONATE 15 ML: 1.2 RINSE ORAL at 19:51

## 2023-10-16 RX ADMIN — FENTANYL CITRATE 50 MCG: 50 INJECTION, SOLUTION INTRAMUSCULAR; INTRAVENOUS at 13:39

## 2023-10-16 RX ADMIN — FENTANYL CITRATE 25 MCG: 50 INJECTION, SOLUTION INTRAMUSCULAR; INTRAVENOUS at 13:50

## 2023-10-16 RX ADMIN — FENTANYL CITRATE 50 MCG: 50 INJECTION INTRAMUSCULAR; INTRAVENOUS at 12:10

## 2023-10-16 RX ADMIN — ONDANSETRON 4 MG: 2 INJECTION INTRAMUSCULAR; INTRAVENOUS at 12:48

## 2023-10-16 RX ADMIN — FENTANYL CITRATE 50 MCG: 50 INJECTION INTRAMUSCULAR; INTRAVENOUS at 12:48

## 2023-10-16 RX ADMIN — MIDAZOLAM 2 MG: 1 INJECTION INTRAMUSCULAR; INTRAVENOUS at 11:19

## 2023-10-16 RX ADMIN — SODIUM CHLORIDE, POTASSIUM CHLORIDE, SODIUM LACTATE AND CALCIUM CHLORIDE: 600; 310; 30; 20 INJECTION, SOLUTION INTRAVENOUS at 11:34

## 2023-10-16 RX ADMIN — CLINDAMYCIN HYDROCHLORIDE 300 MG: 300 CAPSULE ORAL at 18:35

## 2023-10-16 ASSESSMENT — ACTIVITIES OF DAILY LIVING (ADL)
ADLS_ACUITY_SCORE: 35

## 2023-10-16 ASSESSMENT — LIFESTYLE VARIABLES: TOBACCO_USE: 1

## 2023-10-16 NOTE — ANESTHESIA CARE TRANSFER NOTE
Patient: Godfrey Porras    Procedure: Procedure(s):  Open reduction internal fixation of left mandibular angle fracture via intraoral and or extraoral approaches, extraction of tooth #17       Diagnosis: Open fracture of left mandibular angle, sequela (H24) [S02.652S]  Diagnosis Additional Information: No value filed.    Anesthesia Type:   General     Note:    Oropharynx: oropharynx clear of all foreign objects  Level of Consciousness: awake  Oxygen Supplementation: room air    Independent Airway: airway patency satisfactory and stable  Dentition: dentition unchanged  Vital Signs Stable: post-procedure vital signs reviewed and stable  Report to RN Given: handoff report given  Patient transferred to: PACU    Handoff Report: Identifed the Patient, Identified the Reponsible Provider, Reviewed the pertinent medical history, Discussed the surgical course, Reviewed Intra-OP anesthesia mangement and issues during anesthesia, Set expectations for post-procedure period and Allowed opportunity for questions and acknowledgement of understanding      Vitals:  Vitals Value Taken Time   /109 10/16/23 1315   Temp     Pulse 84 10/16/23 1314   Resp 17 10/16/23 1315   SpO2 97 % 10/16/23 1315   Vitals shown include unfiled device data.    Electronically Signed By: ROSA Lares CRNA  October 16, 2023  1:16 PM

## 2023-10-16 NOTE — OP NOTE
Oral and Maxillofacial Surgery  Operative Note       Preoperative Diagnosis  Open fracture of left mandibular angle, sequela (H24) [S02.652S]    Postoperative Diagnosis  Same    Procedure(s):  Open reduction and internal fixation of left mandibular angle fracture  Extraction of impacted tooth #17    Surgeon:  Hipolito Louis DDS, MD, FACS    Resident Surgeon(s):  Chaitanya Kenny DDS    Other surgical staff (if any):  Circulator: Brennan Null RN; Mark Mahajan RN; Tomás Cortes RN  Relief Scrub: Tomasa Gallegos  Scrub Person: Dimitrios Roth    Anesthesia  Anesthesiologist: Tomás Macias MD  CRNA: Xander Carballo APRN CRNA; Devin Brennan APRN CRNA    EBL:   20 mL    Drains: None    Prosthetic Devices:   Implant Name Type Inv. Item Serial No.  Lot No. LRB No. Used Action   IMP SCR SYN MATRIX 2.0X06MM SELF TAP  04.503.406.01 - [id number] Metallic Hardware/Cora IMP SCR SYN MATRIX 2.0X06MM SELF TAP  [phone]  SYNTHES-STRATEC N/A Left 8 Implanted   IMP PLATE SYN MATRIX MALLEABLE CVD 1.0MM 08H  04.503.709 - [id number] Metallic Hardware/[employer]ora IMP PLATE SYN MATRIX MALLEABLE CVD 1.0MM 08H  [phone]  SYNTH[employer]-STRATEC N/A Left 1 Implanted       Specimen Removed:   * No specimens in log *    Complications: none    Indications for Surgery:   Godfrey is a 32 yo M who [place] [facility] [place] a left mandibular angle fracture. Pt stated that he was in [place] and fell down a set of stairs and sustained a left angle and right subcondylar fracture which was treated with MMF with IMF screws. During follow up, the left angle fracture was noted to be infected and mobile. Based on clinical and radiographic findings, the patient was offered extraction of tooth #17 and ORIF of left angle fracture in an OR setting. The procedure, benefits, risks, alternatives including no treatment were discussed in detail with the patient and the patient elected to proceed with the planned surgery.      Procedure description:   On the day of surgery, the patient was seen by myself and Dr. Louis in the pre-op holding area.  The procedure, benefits, risks, alternatives including no treatment were discussed again with the patient and an informed written consent was obtained for the planned procedure.  Patient was transported to the operating room and transferred to the OR table in a supine position.  Airway was secured via nasal tube by the anesthesia team.  Throat pack placed, oral cavity cleansed with chlorhexidine mouth rinse, 10cc 0.25%marcaine with epinephrine was used for left inferior alveolar nerve block. The patient was prepped and draped in a sterile fashion for the planned procedure.  Surgeons  scrubbed and donned sterile attire. A surgical timeout was performed by all members of the team.     Attention directed intraorally to left angle. Purulent drainage noted from buccal fistula adjacent to tooth #19. #15 blade used to make sulcular incision from distobuccal aspect of #18 to distobuccal aspect of #22. Bovie electrocautery with colorado tip used to create distobuccal releasing incision for access. #9 periosteal elevator used to reflect a full thickness mucoperiosteal flap and expose full bony impacted tooth #17 and left mandibular angle fracture. Surgical handpiece with fissure bur used under copious saline irrigation to remove bone covering tooth #17 and section tooth #17 into two pieces. Tooth was then elevated and delivered in two pieces with straight elevator luxation and rongeurs. Socket site curetted and irrigated with copious saline irrigation. Dental curetted used to remove granulation tissue from left angle fracture. A total of two 8 mm and two 12 mm IMF screws placed in the premolar region of each quadrant were used to achieve intra-op MMF using 26 gauge wires. An eight hole strut plate was placed over the left angle fracture and secured with eight 6mm monocortical screws utilizing the trocar  system. The maxillomandibular fixation was released and the occlusion was verified bilaterally and was found to be in maximum intercuspation. The left angle of mandible surgical site was irrigated with copious sterile saline and noted to be hemostatic. 3-0 vicryl suture used in an interrupted and running fashion to reapproximate mucosa. 5-0 fast gut used in an interrupted fashion to close skin incision. IMF screws removed.     Thus, the planned procedure completed, the throat pack was removed, the patient's care was given back to the anesthesia team.      I was told by the OR nurse staff that all needles, sponges, instruments were found to be correct and there were no intraoperative complications noted.     Attending staff was present for the entire duration of the procedure.    Chaitanya Kenny DDS  OMFS Resident, PGY-4

## 2023-10-16 NOTE — PLAN OF CARE
OBS Goals    -diagnostic tests and consults completed and resulted: Yes  -vital signs normal or at patient baseline: yes  -tolerating oral intake to maintain hydration: Yes  -adequate pain control on oral analgesics: in progress      Status: Pt on OBS status. POD #0 s/p ORIF of left mandibular angle fracture via intraoral and or extraoral approaches, extreaction fo tooth #17   Vitals: VSS on RA  Neuros: A&O x4.  IV: PIV infusing with NS @ 100mL/hr. 2nd PIV SL  Resp/trach: LS clear  Diet: Full liquid  Bowel status: Last BM 10/15  : VDSP  Skin: surgical site on Left cheek, padded with dressing under jaw bra with ice. Surgical site inside L mouth, WDL.    Pain: left mouth pain managed with scheduled tylenol and ibuprofen  Activity: up ad allan in room  Plan: OBS goals, OBS handout given. Cont with current POC    Arrived from: PACU  Belongings/meds: With Pt  2 RN Skin Assessment Completed by: JOCELYNE Bravo and JOCELYNE Bravo    Non-intact findings documented (yes/no/NA): Blanchable redness to Right elbow. Left mouth and left cheek incision from procedure today.

## 2023-10-16 NOTE — ANESTHESIA POSTPROCEDURE EVALUATION
Patient: Godfrey Porras    Procedure: Procedure(s):  Open reduction internal fixation of left mandibular angle fracture via intraoral and or extraoral approaches, extraction of tooth #17       Anesthesia Type:  General    Note:     Postop Pain Control: Uneventful            Sign Out: Well controlled pain   PONV: No   Neuro/Psych: Uneventful            Sign Out: Acceptable/Baseline neuro status   Airway/Respiratory: Uneventful            Sign Out: Acceptable/Baseline resp. status   CV/Hemodynamics: Uneventful            Sign Out: Acceptable CV status; No obvious hypovolemia; No obvious fluid overload   Other NRE: NONE   DID A NON-ROUTINE EVENT OCCUR? No           Last vitals:  Vitals Value Taken Time   /97 10/16/23 1410   Temp 36.9  C (98.4  F) 10/16/23 1310   Pulse 73 10/16/23 1410   Resp 12 10/16/23 1410   SpO2 93 % 10/16/23 1410       Electronically Signed By: Tomás Macias MD  October 16, 2023  2:12 PM

## 2023-10-16 NOTE — BRIEF OP NOTE
[facility]    Brief Operative Note    Pre-operative diagnosis: Open fracture of left mandibular angle, sequela (H24) [S02.652S]  Post-operative diagnosis Same as pre-operative diagnosis    Procedure: Open reduction internal fixation of left mandibular angle fracture via intraoral and or extraoral approaches, extraction of tooth #17, and other procedures as indicated., Left - Jaw    Surgeon: Surgeon(s) and Role:     * Hipolito Louis DDS - Primary  Anesthesia: General   Estimated Blood Loss: 20 mL    Drains: None  Specimens: * No specimens in log *  Findings:   Left mandibular angle fracture with granulation tissue and impacted tooth #17. .  Complications: None.  Implants:   Implant Name Type Inv. Item Serial No.  Lot No. LRB No. Used Action   IMP SCR SYN MATRIX 2.0X06MM SELF TAP  [phone] - [id number] Metallic Hardware/[place] IMP SCR SYN MATRIX 2.0X06MM SELF TAP  [id number]  SYNTHES-STRATEC  Left 8 Implanted   IMP PLATE SYN MATRIX MALLEABLE CVD 1.0MM 08H  04.503.709 - [id number] Metallic Hardware/[place] IMP PLATE SYN MATRIX MALLEABLE CVD 1.0MM 08H  04.503.709  SYNTHES-STRATEC  Left 1 Implanted   IMP SCR SYN 2.0X08MM SELF TAP - [id number] Metallic Hardware/[place] IMP SCR SYN 2.0X08MM SELF TAP  SYNTHES[employer]STRATEC  Left 2 Explanted   IMP SCR SYN 2.0X12MM SELF TAP - [id number] Metallic Hardware/[place] IMP SCR SYN 2.0X12MM SELF TAP  SYN[employer]ES[employer]STRATEC  Left 2 Explanted

## 2023-10-16 NOTE — ANESTHESIA PREPROCEDURE EVALUATION
Anesthesia Pre-Procedure Evaluation    Patient: Godfrey Porras   MRN: [id number] : 1990        Procedure : Procedure(s):  Open reduction internal fixation of left mandibular angle fracture via intraoral and or extraoral approaches, extraction of tooth #17, and other procedures as indicated.          Past Medical History:   Diagnosis Date    ADD (attention deficit disorder) without hyperactivity     INDY (generalized anxiety disorder)     Major depression     since about age 12-13.  [place]    Mild persistent asthma     triggers-       Past Surgical History:   Procedure Laterality Date    Left hand surgery for finger fractures Left     3 carpal bones with pins (2 removed)      Allergies   Allergen Reactions    Amoxicillin Rash    Food      Bananas, anaphylaxis    Penicillins Rash      Social History     Tobacco Use    Smoking status: Former     Packs/day: 1.00     Years: 5.00     Additional pack years: 0.00     Total pack years: 5.00     Types: Cigarettes     Quit date: 2012     Years since quittin.4    Smokeless tobacco: Never    Tobacco comments:     2.5 years   Substance Use Topics    Alcohol use: Yes     Alcohol/week: 4.0 - 6.0 standard drinks of alcohol     Types: 4 - 6 Standard drinks or equivalent per week     Comment: Fri and Sat      Wt Readings from Last 1 Encounters:   10/16/23 84.1 kg (185 lb 6.5 oz)        Medications Prior to Admission   Medication Sig Dispense Refill Last Dose    clobetasol (TEMOVATE) 0.05 % external cream Apply topically 2 times daily 45 g 1 Unknown    fluticasone-salmeterol (ADVAIR HFA) 115-21 MCG/ACT inhaler Inhale 2 puffs into the lungs 2 times daily for asthma prevention. 36 g 11 10/15/2023    albuterol (PROAIR HFA/PROVENTIL HFA/VENTOLIN HFA) 108 (90 Base) MCG/ACT inhaler Inhale 2 puffs into the lungs every 4 hours as needed for wheezing for asthma symptoms. 18 g 5  at 0745    fluticasone-salmeterol (ADVAIR) 100-50 MCG/ACT inhaler Inhale 1  puff into the lungs every 12 hours for 30 days 1 each 0     lisdexamfetamine (VYVANSE) 20 MG capsule Take 1 capsule (20 mg) by mouth every morning 30 capsule 0 10/9/2023    montelukast (SINGULAIR) 10 MG tablet Take 1 tablet (10 mg) by mouth At Bedtime for 30 days 30 tablet 0        Anesthesia Evaluation   Pt has had prior anesthetic. Type: General.    No history of anesthetic complications       ROS/MED HX  ENT/Pulmonary:     (+)                tobacco use, Past use,    asthma                  Neurologic:       Cardiovascular:       METS/Exercise Tolerance: >4 METS    Hematologic:       Musculoskeletal:       GI/Hepatic:    (-) GERD   Renal/Genitourinary:       Endo:       Psychiatric/Substance Use:     (+) psychiatric history (ADD) anxiety, depression and other (comment)   Recreational drug usage: Cannabis.    Infectious Disease:       Malignancy:       Other:          Blood pressure 128/88, pulse 72, temperature 36.8  C (98.2  F), temperature source Oral, resp. rate 15, height 1.829 m (6'), weight 84.1 kg (185 lb 6.5 oz), SpO2 100%.    Physical Exam    Airway        Mallampati: IV   TM distance: > 3 FB   Neck ROM: full   Mouth opening: < 3 cm    Respiratory Devices and Support         Dental     Comment: No loose crowns      (+) Modest Abnormalities - crowns, retainers, 1 or 2 missing teeth      Cardiovascular          Rhythm and rate: regular and normal     Pulmonary           breath sounds clear to auscultation           OUTSIDE LABS:  CBC:   Lab Results   Component Value Date    WBC 5.1 11/17/2007    HGB 13.9 03/10/2008    HGB 14.7 11/17/2007    HCT 43.7 11/17/2007     11/17/2007     BMP:   Lab Results   Component Value Date     11/17/2007    POTASSIUM 4.6 11/17/2007    CHLORIDE 106 11/17/2007    CO2 29 11/17/2007    BUN 14 11/17/2007    CR 1.10 11/17/2007    GLC 93 01/17/2022    GLC 95 11/17/2007       HEPATIC:   Lab Results   Component Value Date    ALBUMIN 4.6 11/17/2007    PROTTOTAL 7.5  11/17/2007    ALT 15 11/17/2007    AST 35 11/17/2007    ALKPHOS 105 11/17/2007    BILITOTAL 1.0 11/17/2007     OTHER:   Lab Results   Component Value Date    BRAD 9.9 11/17/2007    TSH 1.58 11/17/2007       Anesthesia Plan    ASA Status:  2    NPO Status:  NPO Appropriate    Anesthesia Type: General.     - Airway: ETT   Induction: Intravenous, Propofol.   Maintenance: Balanced.   Techniques and Equipment:     - Airway: Video-Laryngoscope, Nasal LAURA       - Drips/Meds: Remifentanil (nerve monitoring - succinylcholine for intubation; remifentanil infusion + inhaled agent)     Consents    Anesthesia Plan(s) and associated risks, benefits, and realistic alternatives discussed. Questions answered and patient/representative(s) expressed understanding.     - Discussed: Risks, Benefits and Alternatives for BOTH SEDATION and the PROCEDURE were discussed     - Discussed with:  Patient            Postoperative Care    Pain management: IV analgesics, Oral pain medications, Multi-modal analgesia.   PONV prophylaxis: Ondansetron (or other 5HT-3), Dexamethasone or Solumedrol     Comments:    Other Comments: The patient was given an opportunity to ask questions after discussion of the anesthetic plan and risks. All questions were answered. The patient wishes to proceed with the anesthetic plan.    Discussed potential for risks of dental damage and injury to oral soft tissues: Yes    Discussed potential for risk of positioning injuries: Yes    The anesthetic risks discussed include but are not limited to brain damage, damage to internal organs, heart attack, stroke, awareness, nausea/vomiting and exacerbation of underlying medical problems.        H&P reviewed: Unable to attach H&P to encounter due to EHR limitations. H&P Update: appropriate H&P reviewed, patient examined. No interval changes since H&P (within 30 days).         Tomás Macias MD

## 2023-10-16 NOTE — ANESTHESIA PROCEDURE NOTES
Airway       Patient location during procedure: OR       Procedure Start/Stop Times: 10/16/2023 11:26 AM and 10/16/2023 11:29 AM  Staff -        CRNA: Devin Brennan APRN CRNA       Performed By: CRNA  Consent for Airway        Urgency: elective  Indications and Patient Condition       Indications for airway management: justen-procedural       Induction type:intravenous       Mask difficulty assessment: 2 - vent by mask + OA or adjuvant +/- NMBA    Final Airway Details       Final airway type: endotracheal airway       Successful airway: Nasal and LAURA  Endotracheal Airway Details        ETT size (mm): 8.0       Cuffed: yes       Successful intubation technique: video laryngoscopy       VL Blade Size: MAC 3       Grade View of Cords: 1       Adjucts: stylet       Position: Left       Measured from: gums/teeth       Secured at (cm): 29       Bite block used: None    Post intubation assessment        Placement verified by: capnometry, equal breath sounds and chest rise        Number of attempts at approach: 1       Number of other approaches attempted: 0       Secured with: sutures       Ease of procedure: easy       Dentition: Intact    Medication(s) Administered   Medication Administration Time: 10/16/2023 11:26 AM

## 2023-10-17 ENCOUNTER — DOCUMENTATION ONLY (OUTPATIENT)
Dept: PSYCHOLOGY | Facility: CLINIC | Age: 33
End: 2023-10-17
Payer: COMMERCIAL

## 2023-10-17 VITALS
OXYGEN SATURATION: 97 % | BODY MASS INDEX: 24.96 KG/M2 | HEART RATE: 70 BPM | SYSTOLIC BLOOD PRESSURE: 127 MMHG | TEMPERATURE: 97 F | HEIGHT: 72 IN | DIASTOLIC BLOOD PRESSURE: 84 MMHG | RESPIRATION RATE: 16 BRPM | WEIGHT: 184.3 LBS

## 2023-10-17 DIAGNOSIS — F33.1 MAJOR DEPRESSIVE DISORDER, RECURRENT EPISODE, MODERATE WITH ANXIOUS DISTRESS (H): Primary | ICD-10-CM

## 2023-10-17 PROCEDURE — 250N000013 HC RX MED GY IP 250 OP 250 PS 637: Performed by: DENTIST

## 2023-10-17 PROCEDURE — 250N000013 HC RX MED GY IP 250 OP 250 PS 637: Performed by: STUDENT IN AN ORGANIZED HEALTH CARE EDUCATION/TRAINING PROGRAM

## 2023-10-17 PROCEDURE — G0378 HOSPITAL OBSERVATION PER HR: HCPCS

## 2023-10-17 RX ORDER — CLINDAMYCIN HCL 300 MG
300 CAPSULE ORAL 3 TIMES DAILY
Qty: 15 CAPSULE | Refills: 0 | Status: SHIPPED | OUTPATIENT
Start: 2023-10-17 | End: 2023-10-22

## 2023-10-17 RX ORDER — OXYCODONE HYDROCHLORIDE 5 MG/1
5 TABLET ORAL EVERY 6 HOURS PRN
Qty: 12 TABLET | Refills: 0 | Status: SHIPPED | OUTPATIENT
Start: 2023-10-17 | End: 2023-10-20

## 2023-10-17 RX ORDER — ONDANSETRON 4 MG/1
4 TABLET, ORALLY DISINTEGRATING ORAL EVERY 6 HOURS PRN
Qty: 12 TABLET | Refills: 0 | Status: SHIPPED | OUTPATIENT
Start: 2023-10-17 | End: 2024-07-26

## 2023-10-17 RX ORDER — ACETAMINOPHEN 500 MG
500-1000 TABLET ORAL EVERY 6 HOURS PRN
Qty: 28 TABLET | Refills: 0 | Status: SHIPPED | OUTPATIENT
Start: 2023-10-17 | End: 2023-10-24

## 2023-10-17 RX ORDER — CHLORHEXIDINE GLUCONATE ORAL RINSE 1.2 MG/ML
15 SOLUTION DENTAL 2 TIMES DAILY
Qty: 473 ML | Refills: 0 | Status: SHIPPED | OUTPATIENT
Start: 2023-10-17 | End: 2023-11-02

## 2023-10-17 RX ORDER — IBUPROFEN 600 MG/1
600 TABLET, FILM COATED ORAL EVERY 6 HOURS PRN
Qty: 28 TABLET | Refills: 0 | Status: SHIPPED | OUTPATIENT
Start: 2023-10-17 | End: 2023-10-24

## 2023-10-17 RX ADMIN — OXYCODONE HYDROCHLORIDE 5 MG: 5 TABLET ORAL at 06:18

## 2023-10-17 RX ADMIN — IBUPROFEN 600 MG: 600 TABLET, FILM COATED ORAL at 05:15

## 2023-10-17 RX ADMIN — ACETAMINOPHEN 650 MG: 325 TABLET, FILM COATED ORAL at 00:14

## 2023-10-17 RX ADMIN — CLINDAMYCIN HYDROCHLORIDE 300 MG: 300 CAPSULE ORAL at 06:18

## 2023-10-17 RX ADMIN — ACETAMINOPHEN 650 MG: 325 TABLET, FILM COATED ORAL at 06:18

## 2023-10-17 RX ADMIN — CLINDAMYCIN HYDROCHLORIDE 300 MG: 300 CAPSULE ORAL at 00:14

## 2023-10-17 RX ADMIN — IBUPROFEN 600 MG: 600 TABLET, FILM COATED ORAL at 10:33

## 2023-10-17 RX ADMIN — CHLORHEXIDINE GLUCONATE 15 ML: 1.2 RINSE ORAL at 07:42

## 2023-10-17 ASSESSMENT — ACTIVITIES OF DAILY LIVING (ADL)
ADLS_ACUITY_SCORE: 35

## 2023-10-17 NOTE — PROGRESS NOTES
Discharge time/date: 1052 10/17/23  Walked or Wheelchair: Walked  PIV removed: Yes  Reviewed AVS with patient: Yes  Medication due times added to AVS in EPIC: Yes  Verbalized understanding of discharge with teachback: Yes  Medications retrieved from pharmacy: Yes  Supplies sent home: N/A  Belongings from security with patient: N/A

## 2023-10-17 NOTE — PLAN OF CARE
OBS Goals     -diagnostic tests and consults completed and resulted: Yes  -vital signs normal or at patient baseline: yes  -tolerating oral intake to maintain hydration: Yes  -adequate pain control on oral analgesics: Yes        Status: POD #1 s/p ORIF of left mandibular angle fracture via intraoral and or extraoral approaches, extreaction fo tooth #17   Vitals: VSS  Neuros: Left surgical facial numbness.   IV: PIV SL  Resp/trach: LS clear  Diet: Full liquid  Bowel status: Last BM 10/15  : VDSP  Skin: surgical site on Left cheek, padded with dressing under jaw bra with ice. Surgical site inside L mouth, WDL.    Pain: left mouth pain managed with scheduled tylenol and ibuprofen  Activity: up ad allan in room  Plan: Possible discharge home today.

## 2023-10-17 NOTE — PROGRESS NOTES
Case Consultation Record       Client Name: Godfrey Porras   Date:  10/17/23    Diagnosis: Major Depressive Disorder, recurrent episode, moderate with anxious distress    Therapist: Jeannette Gilliam, Psychotherapist Trainee, [facility]      Team Members Present:  Valentine King [facility], and Therapist    Purpose:   Risk Management    Recommendations:  It is recommended for pt to follow all medical instructions from doctor and for provider to continue to plan of treatment with him. It is also recommended for provider to use pain situation through pointing out that he was able to get through it and discuss how he did that using a strengths based perspective. It is recommended that pt explore online meditations related to pain to help the mind component of perceived pain. All recommendations will be provided to pt next appointment with therapist.       Jeannette Gilliam, Psychotherapist Trainee, [facility]  October 17, 2023

## 2023-10-17 NOTE — DISCHARGE SUMMARY
[facility]    Oral & Maxillofacial Surgery - Discharge Summary    Date of Admission:  10/16/2023  Date of Discharge:  10/17/2023  Discharging Attending Provider: Dr. Hipolito Louis  Discharge Service: Oral & Maxillofacial Surgery    Discharge Diagnoses   Left mandible angle fracture       Follow-ups Needed After Discharge   Follow up as outpatient at the OM clinic next Monday. Our clinic staff will call to schedule.     Oral and Maxillofacial Surgery (OMFS) Clinic  [facility]  Lisa Washington - 7th floor  [address] [place] [id number]  Clinic phone number: [phone]  Clinic fax number: [phone]     Hospital Course   Godfrey Porras was admitted on 10/16/2023 for ORIF left mandible angle fracture. The problem of infection of the mandible angle fracture was address and fixed with the surgery. Your pain is controlled with scheduled PO pain medication. You can tolerate fluid well. Spontaneous voiding and good ambulation.Patient was discharged on 10/17/2023 with home medication and postoperative instructions.     Consultations This Hospital Stay   None    Code Status   Full Code       The patient was discussed with Dr. Carlos Kenny and Dr. Stefanie French, [facility]  Oral & Maxillofacial Surgery, intern  Pager: [phone]  ______________________________________________________________________    Physical Exam   Vital Signs: Temp: 97.8  F (36.6  C) Temp src: Axillary BP: 125/89 Pulse: 95   Resp: 16 SpO2: 100 % O2 Device: None (Room air)    Weight: 184 lbs 4.87 oz    GEN: WD/WN male, NAD  HEENT: NC/AT, EOMI, PERRL, left sided facial edema soft and tender consistent with postoperative edema, extraoral incision site is well approximated with presence of sutures, jaw bra with ice packs in place.   I/O: left sided vestibular edema consistent with postoperative extraction, presence of chromic gut sutures in place,  no persistent bleeding, TASHA 30 mm, occlusion is stable and repeatable   CV: RRR, no M/G/R  PULM: CTAB, breathing comfortably on room air  GI: Soft, ND/NT  MSK: MARROQUIN, no peripheral extremity edema  NEURO: AAOx4, CN II-XII intact bilaterally  PSYCH: Appropriate mood and affect     Significant Results and Procedures     Pending Results   No pending results   Unresulted Labs Ordered in the Past 30 Days of this Admission       No orders found for last 31 day(s).               Primary Care Physician   Physician No Ref-Primary    Discharge Disposition   Discharged to home  Condition at discharge: Stable    Discharge Orders      Follow Up ([facility])    Follow up as outpatient at the [facility] clinic in 7-10 days after the surgery. Our clinic staff will call to schedule    Oral and Maxillofacial Surgery (OMFS) Clinic  [facility]  [facility] - 7th floor  [address] [place] [id number]  Clinic phone number: [phone]  Clinic fax number: [phone]     Reason for your hospital stay    You stayed in the hospital for an open reduction internal fixation of left mandibular angle fracture and extraction of the impacted tooth #17 surgery on 10/16/2023. You stayed in the hospital for one night and are discharged on 10/17/2023     Activity    Your activity upon discharge: light activity, no strenuous activities or contact sport for 6 weeks. No heavy lifting that is more than a bag of groceries. Keep your head elevated above your heart.     Discharge Instructions    POSTSURGICAL INSTRUCTION    VISITORS   It has been our experience that in the 2 weeks after surgery was a time when visitors are best kept to a minimum. It is encouraged that you have very few visitors and they be limited to the immediate members of the family and/or very close friends.     WOUNDS  The wounds in your mouth and skin should be left alone and undisturbed for the first 24 hours after surgery. You may begin rinsing your  mouth on the second day after surgery. A prescription mouth rinse should be used and will be prescribed by your doctor.  Avoid probing the wound with anything and do not use a water-pik during your healing course. Please begin gentle brushing the day after surgery if possible. Keeping the oral cavity and teeth clean will help reduce the risk of infection. Trauma to incisions can result in loosening of sutures and opening of wounds. If you smoke please refrain for as long as possible, up to a week (or forever) is beneficial to healing.    NAUSEA AND VOMITING   You may experience some nausea or vomiting after surgery.  It is important to realize that this is not a life threatening situation since your stomach is empty.  If you have had some liquids, remember anything that went in through the teeth can come out through the teeth. If vomiting does occur; remain calm and turn on your side or sit up and lean forward so that any fluid produced can be emptied from your mouth.    SWELLING  Swelling occurs after all surgery.  The degree of swelling is quite variable in different individuals.  More swelling usually occurs with the lower jaw surgery.  Swelling will continue to increase for approximately 48 to 72 hours following surgery, and then will resolve within 10 days to 2 weeks. We try to minimize your swelling with medication but some swelling should be anticipated.  Also, ice packs may be applied to your face for 24-48 hours. You can reduce swelling by keeping your head elevated for the first week following surgery and by walking as soon as possible after surgery. Use Vaseline ointment to keep your lips moist.    BLEEDING/NUMBNESS  It is common to experience minor bleeding both from the nose and mouth following surgery.  This generally stops at 24 to 48 hours but may continue for 7-10 days after surgery. It is normal to notice old blood clots in your mouth and throat 1-2 weeks after surgery. Both your upper and lower  lips will be numb from the surgery and may stay that way for a few months. Many patients recover all sensation to these areas but about 10-20% of patients will have some permanent lip, chin and teeth numbness following surgery.     NASAL STUFFINESS AND SORE THROAT  Nasal stuffiness and a sore throat can occur, both from the tubes placed during surgery and from surgery on the upper jaw.  When stuffiness occurs, it can be managed with a combination of cleansing of the nostrils and nasal sprays.  Nasal secretions can be removed by using cotton-tipped swabs soaked in a solution of hydrogen peroxide and water (one to three parts). DO NOT BLOW YOUR NOSE and only sneeze with your mouth open for two weeks.    When it is necessary to use a decongestant nasal spray, squeeze the spray bottle with sufficient force for you to taste the medication.  This will provide relief in approximately 3 to 5 minutes, but nasal decongestant sprays should not be used for more than 4 days. You can use nasal saline sprays as often as needed.  The nasal stuffiness will resolve within approximately 1-2 weeks following surgery. Your sore throat should improve by the third or fourth day following surgery. If your throat is still bothering you more than a week after surgery please make us aware of this.     HYDRATION  It will be important that you drink a sufficient volume of fluids to keep yourself hydrated.  An average adult requires 2 to 3 quarts of fluid every 24 hours.  While this may seem like a large quantity, it can be best achieved with constant sipping.  Most patients drink directly from a cup or glass using a straw.  However, for those who find this difficult a large catheter tipped syringe will be available to assist you in taking fluids.    ACTIVITY  Generally you need to rest after surgery. Listen to your body - if it hurts, don't do it. You are encouraged to begin to walk as soon as possible. Walking is important for recovery, as it  "helps with swelling and improves your mood. Keep your head elevated above your heart, and if you had upper jaw surgery, do not lift anything heavier than a bag of groceries. Avoid strenuous exercise. We will discuss advancing your activity at follow up visits.    SPEECH  The ease with which you can communicate and be understood is not predictable immediately after surgery.  Your speech will improve, however, by repeated attempts on your part to talk and be understood.  It is important that you slow your rate of speech, concentrate on each word, and be willing to try.  Most patients can be understood within 24 hours after surgery but speech may take time to adapt to the new jaw position.    CLEANING THE TEETH   You will be encouraged to brush your teeth following each meal.  A child-sized soft toothbrush can be utilized for this purpose, paying particular attention to keeping the brush in direct contact with the teeth.  In addition to brushing, a mouth rinse may be used.  The rinse is made by diluting hydrogen peroxide, 50:50, with any commercial mouth rinse.  Do not use a water irrigating device, such as \"water pik,\" until after 2 weeks following surgery.  These irrigating devices have sufficient force to tear open the sutures in your mouth.  A toothbrush will, however, provide an excellent means of maintaining oral hygiene approximately 2 weeks following surgery.    POSTOPERATIVE PAIN  Pain must be expected. In most instances, however, it is moderate and treated with medications. Every patient experiences pain differently and there is no one size fits all approach. Your surgical team will attempt to tailor your medications to best control your pain. Generally, no injections are needed for pain and a liquid medication is all that is required.    MEDICATIONS  During the period of hospitalizations, you will usually be given antibiotics, nasal decongestants, nasal spray, ointment to keep your lips moist, and pain " "medication if needed.  Most often these will be discontinued on discharge from the hospital, except for some of the medications, which will be used for 3 days to 2 weeks following surgery. The typical regimen of medications include pain medication, stool softener, nasal decongestants, mucolytics and anti-nausea medications. If you feel that you need additional medications please inform your surgical team. Below is a list of medications and the common usages    COMMON MEDICATIONS:  Hydrocodone/Oxycodone - Narcotic pain control  Acetominophen/Ibuprofen - Non-narcotic pain control  Amoxicillin/Clindamycin - Antibiotic, typically a 5-7 day course  Chlorhexidine Rinse - An antiseptic mouth rinse that help reduce the bacterial load in the oral cavity    DIET  Maintain non chew diet for 6 weeks after the surgery.    WEIGHT LOSS  Again, a weight loss of 10 to 20 pounds may be anticipated during the postoperative period.  This is usually a reflection of a loss of appetite, rather than the fact that the teeth are \"wired\" together.  Within 2 weeks following surgery, your appetite should be sufficiently improved to maintain and possibly increase your weight.    DAY OF DISCHARGE  Most patients are ready for discharge 0 to 2 days after surgery.  You will be encouraged to resume your normal activities as soon as possible. Your discharge from the hospital will be dictated by four things. One, your ability to walk independently. Two, your ability to maintain adequate fluid intake to prevent dehydration. Three, your ability and urinate without difficulty and four, that your pain is controlled with medications taken by mouth. After your discharge you will be provided the contact information of the on-call Oral surgery resident. This person is available for questions should any arise after discharge but please read through this entire document prior to calling. The answer may be in the document.      IN SUMMARY:   1. Keep your mouth " clean by gently brushing and rinsing often  2. Keep up some daily activity such as walking  3. Pain in normal, stay ahead of your pain by taking all medications as prescribed  4. Swelling is normal, use ice for 3 days following surgery then switch to warm compresses  6. Bleeding from the incisions and nose is normal and can be expected for 7-10 days after surgery  7. You can use straws or spoons  8. Bruising, even to the chest is normal but does not happen in every case  9. Keep hydrated and consume as many calories as you can. This will speed your recovery.     THINGS TO WATCH OUT FOR:  Any new onset of swelling or redness after 5 days  Any increase in pain after 5 days, your pain should reduce, even if just a little, each day after surgery  Any changes in the way the teeth fit together     If you have any questions please feel free to call our clinic or page the oral surgery resident on call. You may also e-mail me at any time.     [phone] - Ask for the Oral Surgery Resident on Call     Diet    Follow this diet upon discharge: Strict no chew diet for 6 weeks after the surgery     Discharge Medications   Current Discharge Medication List        START taking these medications    Details   acetaminophen (TYLENOL) 500 MG tablet Take 1-2 tablets (500-1,000 mg) by mouth every 6 hours as needed for mild pain  Qty: 28 tablet, Refills: 0    Associated Diagnoses: Open fracture of left mandibular angle, sequela (H24)      chlorhexidine (PERIDEX) 0.12 % solution Swish and spit 15 mLs in mouth 2 times daily for 16 days  Qty: 473 mL, Refills: 0    Associated Diagnoses: Open fracture of left mandibular angle, sequela (H24)      clindamycin (CLEOCIN) 300 MG capsule Take 1 capsule (300 mg) by mouth 3 times daily for 5 days  Qty: 15 capsule, Refills: 0    Associated Diagnoses: Open fracture of left mandibular angle, sequela (H24)      ibuprofen (ADVIL/MOTRIN) 600 MG tablet Take 1 tablet (600 mg) by mouth every 6 hours as  needed for moderate pain  Qty: 28 tablet, Refills: 0    Associated Diagnoses: Open fracture of left mandibular angle, sequela (H24)      ondansetron (ZOFRAN ODT) 4 MG ODT tab Take 1 tablet (4 mg) by mouth every 6 hours as needed for nausea  Qty: 12 tablet, Refills: 0    Comments: Take with oxycodone to reduce nausea  Associated Diagnoses: Open fracture of left mandibular angle, sequela (H24)      oxyCODONE (ROXICODONE) 5 MG tablet Take 1 tablet (5 mg) by mouth every 6 hours as needed for pain  Qty: 12 tablet, Refills: 0    Associated Diagnoses: Open fracture of left mandibular angle, sequela (H24)           CONTINUE these medications which have NOT CHANGED    Details   clobetasol (TEMOVATE) 0.05 % external cream Apply topically 2 times daily  Qty: 45 g, Refills: 1    Associated Diagnoses: Phimosis      fluticasone-salmeterol (ADVAIR HFA) 115-21 MCG/ACT inhaler Inhale 2 puffs into the lungs 2 times daily for asthma prevention.  Qty: 36 g, Refills: 11    Associated Diagnoses: Moderate persistent asthma without complication      albuterol (PROAIR HFA/PROVENTIL HFA/VENTOLIN HFA) 108 (90 Base) MCG/ACT inhaler Inhale 2 puffs into the lungs every 4 hours as needed for wheezing for asthma symptoms.  Qty: 18 g, Refills: 5    Comments: Pharmacy may dispense brand covered by insurance (Proair, or proventil or ventolin or generic albuterol inhaler)  Associated Diagnoses: Moderate persistent asthma without complication      fluticasone-salmeterol (ADVAIR) 100-50 MCG/ACT inhaler Inhale 1 puff into the lungs every 12 hours for 30 days  Qty: 1 each, Refills: 0    Associated Diagnoses: Mild intermittent asthma with exacerbation      lisdexamfetamine (VYVANSE) 20 MG capsule Take 1 capsule (20 mg) by mouth every morning  Qty: 30 capsule, Refills: 0    Associated Diagnoses: Attention deficit hyperactivity disorder (ADHD), combined type      montelukast (SINGULAIR) 10 MG tablet Take 1 tablet (10 mg) by mouth At Bedtime for 30 days  Qty:  30 tablet, Refills: 0    Associated Diagnoses: Mild intermittent asthma with exacerbation           Allergies   Allergies   Allergen Reactions    Amoxicillin Rash    Food      Bananas, anaphylaxis    Penicillins Rash

## 2023-10-18 ENCOUNTER — PATIENT OUTREACH (OUTPATIENT)
Dept: CARE COORDINATION | Facility: CLINIC | Age: 33
End: 2023-10-18
Payer: COMMERCIAL

## 2023-10-18 NOTE — PROGRESS NOTES
Clinic Care Coordination Contact  [facility]: Post-Discharge Note  SITUATION                                                      Admission:    Admission Date: 10/16/23   Reason for Admission: infection of mandible angle fracture  Discharge:   Discharge Date: 10/17/23  Discharge Diagnosis: Left mandible angle fracture    BACKGROUND                                                      Per hospital discharge summary and inpatient provider notes:    Godfrey Porras was admitted on 10/16/2023 for ORIF left mandible angle fracture. The problem of infection of the mandible angle fracture was addressed and fixed with the surgery. Your pain is controlled with scheduled PO pain medication. You can tolerate fluid well. Spontaneous voiding and good ambulation.Patient was discharged on 10/17/2023 with home medication and postoperative instructions.      ASSESSMENT           Discharge Assessment  How are you doing now that you are home?: I'm OK. I'm sore  How are your symptoms? (Red Flag symptoms escalate to triage hotline per guidelines): Improved  Do you feel your condition is stable enough to be safe at home until your provider visit?: Yes  Does the patient have their discharge instructions? : Yes  Does the patient have questions regarding their discharge instructions? : No  Were you started on any new medications or were there changes to any of your previous medications? : Yes  Does the patient have all of their medications?: Yes  Do you have questions regarding any of your medications? : No  Do you have all of your needed medical supplies or equipment (DME)?  (i.e. oxygen tank, CPAP, cane, etc.): Yes  Discharge follow-up appointment scheduled within 14 calendar days? : Yes  Discharge Follow Up Appointment Date: 10/23/23  Discharge Follow Up Appointment Scheduled with?: Specialty Care Provider (surgery provider)         Post-op (Clinicians Only)  Did the patient have surgery or a procedure: Yes (Open reduction  "internal fixation of left mandibular angle fracture via intraoral and extraoral approaches, extraction of tooth #17)  Incision: closed;healing  Drainage: No  Bleeding: none  Fever: No  Chills: No  Redness: No  Warmth: No  Swelling: Yes (as expected per surgery provider. wearing \"jaw bra\" as directed)  Incision site pain: Yes (managing with ibuprofen and acetaminophen with rare use of oxycodone)  Closure: suture;non-dissolving  Eating & Drinking: eating and drinking without complaints/concerns  PO Intake: soft foods  Bowel Function: normal  Date of last BM: 10/15/23  Urinary Status: voiding without complaint/concerns    Patient doing as expected following surgery. States his pain is well controlled. Wearing \"jaw bra\" and states this is helping with swelling. No further questions or concerns at this time. 24/7 [facility] nurse triage phone number provided to patient.       PLAN                                                      Outpatient Plan:  keep appointments as scheduled.     Future Appointments   Date Time Provider Department Center   10/19/2023  9:00 AM Jeannette Gilliam [facility]   10/31/2023 10:00 AM Jeannette Gilliam [facility]         For any urgent concerns, please contact our 24 hour nurse triage line: [phone] ([phone])         Gloria Blunt RN              "

## 2023-10-19 ENCOUNTER — NURSE TRIAGE (OUTPATIENT)
Dept: NURSING | Facility: CLINIC | Age: 33
End: 2023-10-19
Payer: COMMERCIAL

## 2023-10-19 ENCOUNTER — VIRTUAL VISIT (OUTPATIENT)
Dept: PSYCHOLOGY | Facility: CLINIC | Age: 33
End: 2023-10-19
Payer: COMMERCIAL

## 2023-10-19 DIAGNOSIS — F33.1 MAJOR DEPRESSIVE DISORDER, RECURRENT EPISODE, MODERATE WITH ANXIOUS DISTRESS (H): Primary | ICD-10-CM

## 2023-10-19 PROCEDURE — 90834 PSYTX W PT 45 MINUTES: CPT | Mod: VID

## 2023-10-19 NOTE — TELEPHONE ENCOUNTER
Had surgery on the 16th. Given ibuprofen, Tylenol and Oxycodone. Also has a medical marijuana card. Remember reading (wisdom tooth removed and plate put in jaw) wants to know when he can smoke or vape it. Read about complications risk for dry socket when getting tooth removed. I advised he call the surgeon's office. He will do that.  Alanis Escudero RN  [facility]    Reason for Disposition   Caller wants to use a complementary or alternative medicine    Additional Information   Negative: [1] Intentional drug overdose AND [2] suicidal thoughts or ideas   Negative: Drug overdose and triager unable to answer question   Negative: Caller requesting a renewal or refill of a medicine patient is currently taking   Negative: Caller requesting information unrelated to medicine   Negative: Caller requesting information about COVID-19 Vaccine   Negative: Caller requesting information about Emergency Contraception   Negative: Caller requesting information about Combined Birth Control Pills   Negative: Caller requesting information about Progestin Birth Control Pills   Negative: Caller requesting information about Post-Op pain or medicines   Negative: Caller requesting a prescription antibiotic (such as Penicillin) for Strep throat and has a positive culture result   Negative: Caller requesting a prescription anti-viral med (such as Tamiflu) and has influenza (flu) symptoms   Negative: Immunization reaction suspected   Negative: Rash while taking a medicine or within 3 days of stopping it   Negative: [1] Asthma and [2] having symptoms of asthma (cough, wheezing, etc.)   Negative: [1] Symptom of illness (e.g., headache, abdominal pain, earache, vomiting) AND [2] more than mild   Negative: Breastfeeding questions about mother's medicines and diet   Negative: MORE THAN A DOUBLE DOSE of a prescription or over-the-counter (OTC) drug   Negative: [1] DOUBLE DOSE (an extra dose or lesser amount) of prescription drug AND [2]  any symptoms (e.g., dizziness, nausea, pain, sleepiness)   Negative: [1] DOUBLE DOSE (an extra dose or lesser amount) of over-the-counter (OTC) drug AND [2] any symptoms (e.g., dizziness, nausea, pain, sleepiness)   Negative: Took another person's prescription drug   Negative: [1] DOUBLE DOSE (an extra dose or lesser amount) of prescription drug AND [2] NO symptoms  (Exception: A double dose of antibiotics.)   Negative: Diabetes drug error or overdose (e.g., took wrong type of insulin or took extra dose)   Negative: [1] Prescription not at pharmacy AND [2] was prescribed by PCP recently (Exception: Triager has access to EMR and prescription is recorded there. Go to Home Care and confirm for pharmacy.)   Negative: [1] Pharmacy calling with prescription question AND [2] triager unable to answer question   Negative: [1] Caller has URGENT medicine question about med that PCP or specialist prescribed AND [2] triager unable to answer question   Negative: Medicine patch causing local rash or itching   Negative: [1] Caller has medicine question about med NOT prescribed by PCP AND [2] triager unable to answer question (e.g., compatibility with other med, storage)   Negative: Prescription request for new medicine (not a refill)   Negative: [1] Caller has NON-URGENT medicine question about med that PCP prescribed AND [2] triager unable to answer question    Protocols used: Medication Question Call-A-

## 2023-10-19 NOTE — PROGRESS NOTES
[facility]                                     Progress Note    Patient Name: Godfrey Porras  Date: 10/19/23           Service Type: Individual      Session Start Time: 9:05am Session End Time: 9:50am     Session Length: 45 minutes    Session #: 14    Attendees: Client attended alone    Service Modality:  Video Visit:      Provider verified identity through the following two step process.  Patient provided:  Patient is known previously to provider     Telemedicine Visit: The patient's condition can be safely assessed and treated via synchronous audio and visual telemedicine encounter.       Reason for Telemedicine Visit: Patient has requested telehealth visit and Patient convenience (e.g. access to timely appointments / distance to available provider)     Originating Site (Patient Location): Patient's home     Distant Site (Provider Location): [facility]     Consent:  The patient/guardian has verbally consented to: the potential risks and benefits of telemedicine (video visit) versus in person care; bill my insurance or make self-payment for services provided; and responsibility for payment of non-covered services.      Patient would like the video invitation sent by:  My Chart     Mode of Communication:  Video Conference via [employer]     Distant Location (Provider):  On-site     As the provider I attest to compliance with applicable laws and regulations related to telemedicine.    DATA  Extended Session (53+ minutes): No  Interactive Complexity: No  Crisis: No        Progress Since Last Session (Related to Symptoms / Goals / Homework):   Symptoms: Worsening mental health symptoms since last appointment    Homework: Achieved / completed to satisfaction      Episode of Care Goals: Satisfactory progress - ACTION (Actively working towards change); Intervened by reinforcing change plan / affirming steps taken     Current / Ongoing Stressors and  "Concerns:   Pt reports ongoing stressors as having surgery on his jaw this week and navigating the financial issues he is going to have in a few weeks. Pt reports mood since last session has been \"not great\" as he has been experiencing low mood, \"feeling down\" and anxiety about the future. Pt reports using the skills of thought challenging by reminding himself that \"this is all temporary\" and creating plans to achieve the future he wants. Pt and provider discussed his emotions, provider validated pt's emotions, and discussed next steps/resources that may benefit . Pt appears to have gained insight into the options he has to move forward and the strengths he has to get to where he wants to be. Pt appears to be working toward finding new relaxation and distress tolerance skills outside of marijuana use. Pt denies any active SI, SIB, or HI since last session stating \"I don't want to die. I want to live a different life\". Pt endorsed ongoing passive SI that has not changed since last appointment. Pt verbalized access to safety plan and agrees to use it if needed. Pt and provider discussed places pt could go to receive more immediate crisis mental health care if he cannot keep himself safe and pt verbalized understanding as well as agreement. See below for plan of care until next appointment. Pt reports he is not staying at his apartment right now; pt is staying with dad and feels safe in the home. Pt does not have access to fire arms or other lethal means. Pt denied having any plan or intent to harm himself and feels that he will be able to keep himself safe.       Treatment Objective(s) Addressed in This Session:   use relaxation strategies 2 times per day to reduce the physical symptoms of anxiety  Increase interest, engagement, and pleasure in doing things  Feel less tired and more energy during the day   Identify negative self-talk and behaviors: challenge core beliefs, myths, and actions  Improve concentration, " focus, and mindfulness in daily activities   Decrease thoughts that you'd be better off dead or of suicide / self-harm  Strengths based approach to pt's internal motivation for success.      Intervention:   CBT: pt shared how he is challenging his anxious and depressive thoughts as well as behaviors he has implemented to make the changes a reality  Motivational Interviewing: evoking change talk, goal planning, increasing internal motivation for change, reframing, OARS  Solution Focused: Safety plan review, immediate resource discussion for jobs, emergency assistance, and places he should go if he would decided he needs extra mental health support.     Assessments completed prior to visit:  The following assessments were completed by patient for this visit:  PROMIS 10-Global Health (all questions and answers displayed):       3/29/2022     9:02 AM 8/8/2022     9:50 AM 5/31/2023    10:16 AM 6/7/2023     1:18 PM   PROMIS 10   In general, would you say your health is: Good Fair  Fair   In general, would you say your quality of life is: Good Good  Good   In general, how would you rate your physical health? Fair Fair  Good   In general, how would you rate your mental health, including your mood and your ability to think? Fair Fair  Poor   In general, how would you rate your satisfaction with your social activities and relationships? Fair Poor  Poor   In general, please rate how well you carry out your usual social activities and roles Poor Good  Poor   To what extent are you able to carry out your everyday physical activities such as walking, climbing stairs, carrying groceries, or moving a chair? Completely Completely  Mostly   In the past 7 days, how often have you been bothered by emotional problems such as feeling anxious, depressed, or irritable? Often Sometimes  Often   In the past 7 days, how would you rate your fatigue on average? Moderate Moderate  Severe   In the past 7 days, how would you rate your pain on  average, where 0 means no pain, and 10 means worst imaginable pain? 2 3  3   In general, would you say your health is: 3 2 3 2   In general, would you say your quality of life is: 3 3 3 3   In general, how would you rate your physical health? 2 2 3 3   In general, how would you rate your mental health, including your mood and your ability to think? 2 2 1 1   In general, how would you rate your satisfaction with your social activities and relationships? 2 1 2 1   In general, please rate how well you carry out your usual social activities and roles. (This includes activities at home, at work and in your community, and responsibilities as a parent, child, spouse, employee, friend, etc.) 1 3 2 1   To what extent are you able to carry out your everyday physical activities such as walking, climbing stairs, carrying groceries, or moving a chair? 5 5 4 4   In the past 7 days, how often have you been bothered by emotional problems such as feeling anxious, depressed, or irritable? 4 3 5 4   In the past 7 days, how would you rate your fatigue on average? 3 3 5 4   In the past 7 days, how would you rate your pain on average, where 0 means no pain, and 10 means worst imaginable pain? 2 3 3 3   Global Mental Health Score 9 9 7 7   Global Physical Health Score 14 14 12 13   PROMIS TOTAL - SUBSCORES 23 23 19 20         ASSESSMENT: Current Emotional / Mental Status (status of significant symptoms):   Risk status (Self / Other harm or suicidal ideation)   Patient denies current fears or concerns for personal safety.   Patient reports the following current or recent suicidal ideation or behaviors: pt reports passive SI during session on 10/19/23 Pt denies any active SI and does not have a plan or intention on harming himself. Pt contracts for safety and agrees to use safety plan if necessary.   Patient denies current or recent homicidal ideation or behaviors.   Patient denies current or recent self injurious behavior or  ideation.   Patient denies other safety concerns.   Patient reports there has been no change in risk factors since their last session.     Patient reports there has been no change in protective factors since their last session.     A safety and risk management plan has been developed including: Patient consented to co-developed safety plan on 6/20/23.  Safety and risk management plan was reviewed.   Patient agreed to use safety plan should any safety concerns arise.  A copy was made available to the patient.     Appearance:   Appropriate    Eye Contact:   Good    Psychomotor Behavior: Normal    Attitude:   Cooperative    Orientation:   All   Speech    Rate / Production: Normal     Volume:  Normal    Mood:    Anxious    Affect:    Appropriate    Thought Content:  Clear    Thought Form:  Coherent  Logical    Insight:    Good      Medication Review:   No current psychiatric medications prescribed     Medication Compliance:   Yes     Changes in Health Issues:   Yes: Pain, Associated Psychological Distress     Chemical Use Review:   Substance Use: decrease in alcohol , cannabis , and cocaine .  Patient reports frequency of use none due to surgery at this time.  Stage of Change: Preparatory  Reviewed information and resources for treatment and ongoing sobriety  Patient assessed present costs and future losses as a result of substance use  Reviewed concerns related to health related substance abuse risk  Provided support and affirmation for steps taken towards sobriety   Discussed SMART Recovery and gave pt resources to access that information if desired in the future   .   Tobacco Use: No current tobacco use.     Marijuana use pt has identified as the biggest problem, cravings, tolerance, etc.    Diagnosis:  Major Depressive Disorder, Recurrent Episode, Moderate with anxious distress    Collateral Reports Completed:   Not Applicable    PLAN: (Patient Tasks / Therapist Tasks / Other)  Pt is to look into the SMART Recovery  website and think about if he would be interested in using that model to help reduce his Marijuana use in the future.   Pt is to look into the [facility] Emergency General Assistance to cover his rent for next month as pt believes he will be unable to pay it.   Pt is to look into career force for information and career counseling, if he decides he wants to go that route.  Pt is to follow safety plan if needed and reach out to support system if feeling like he is unable to keep himself safe. Pt has access to safety plan via [employer] message.  Pt is to reach out to the organization that he was supposed to interview with earlier this week and find a time to reschedule the interview.       Jeannette Gilliam, Psychotherapist Trainee, [facility]  10/19/23      This note has been reviewed and I agree with the plan of care. This note is co-signed by Valentine King MA, [facility] Supervisor, on:10/20/2023                                                                 ______________________________________________________________________    Individual Treatment Plan    Patient's Name: Godfrey Porras  YOB: 1990    Date of Creation: 6/13/23  Date Treatment Plan Last Reviewed/Revised: 9/19/23    DSM5 Diagnoses: 296.32 (F33.1) Major Depressive Disorder, Recurrent Episode, Moderate With anxious distress  Psychosocial / Contextual Factors: loss of important relationship, stress at work and stress within important roles, death of close friends  PROMIS (reviewed every 90 days): 20    Referral / Collaboration:  Referral to another professional/service is not indicated at this time..    Anticipated number of session for this episode of care: 9-12 sessions  Anticipation frequency of session: Weekly  Anticipated Duration of each session: 38-52 minutes  Treatment plan will be reviewed in 90 days or when goals have been changed.       MeasurableTreatment Goal(s) related to diagnosis / functional impairment(s)  Goal 1: Patient  will decrease his depressive symptoms by 50%.    I will know I've met my goal when I can do things I enjoy doing again.      Objective #A    Patient will Decrease frequency and intensity of feeling down, depressed, hopeless.  Status: Continued - Date(s): 9/19/23    Intervention(s)  Therapist will teach emotion regulation skills and coping skills for his depressive symptoms. Pt will be assigned homework related to practicing skills outside of therapy.    Objective #B  Patient will Identify negative self-talk and behaviors: challenge core beliefs, myths, and actions.   Status: Continued - Date(s): 9/19/23    Intervention(s)  Therapist will teach about thinking patterns and core beliefs/automatic thoughts. Pt will be assigned homework related to recognizing emotions and thinking patterns within himself to discuss during session .    Objective #C  Patient will Decrease thoughts that you'd be better off dead or of suicide / self-harm.   Status: Continued - Date(s): 9/19/23    Intervention(s)  Therapist will teach emotion regulation skills, distress tolerance skills, and thought modification to replace his maladaptive automatic thoughts. Pt will be assigned homework of thought logs and practicing skills outside of therapy .    Objective #D   Patient will use cognitive strategies identified in therapy to challenge anxious thoughts    Status: Continued - Date(s): 9/19/23    Intervention(s)  Therapist will assign homework related to thought challenging outside of the therapy room using emotion regulation skills learned .    Objective #E  Patient will identify 5 values that are important to him and the way he lives his life. Pt will identify ways he can live with those values in mind.    Status: Completed - Date: 9/19/23    Intervention(s)  Therapist will introduce ACT and value identification to pt. Pt will be assigned homework related to behavioral activation.    Objective #F  Patient will talk to at least two others about  "losses and coping.   Status: Continued - Date(s): 9/19/23    Intervention(s)  Therapist will assign homework related to pt reaching out and building stronger social support in his life. .      Patient has reviewed and agreed to the above plan.      Jeannette Gilliam, Psychotherapist Trainee, [facility]    September 19, 2023  __________________________________________________________________________________      [facility]                                       Godfrey Porras     SAFETY PLAN:  Step 1: Warning signs / cues (Thoughts, images, mood, situation, behavior) that a crisis may be developing:  Thoughts: \"People would be better off without me\", \"I'm a burden\", \"I can't do this anymore\" and \"Nothing makes it better\"  Images: flashbacks  Thinking Processes: ruminations (can't stop thinking about my problems): financial concerns and job concerns, racing thoughts, intrusive thoughts (bothersome, unwanted thoughts that come out of nowhere): thoughts of self harm and highly critical and negative thoughts: self judgemental thoughts and self distain  Mood: worsening depression, hopelessness, intense anger and disinhibited (not caring about things or consequences)  Behaviors: isolating/withdrawing , using drugs, using alcohol, aggression, not taking care of myself and not taking care of my responsibilities  Situations: relationship problems and financial stress   Step 2: Coping strategies - Things I can do to take my mind off of my problems without contacting another person (relaxation technique, physical activity):  Distress Tolerance Strategies:  relaxation activities: distraction activities in his home and sensory based activities/self-soothe with five senses: doing something that will taste sour and ground self using 5-4-3-2-1  Physical Activities: go for a walk and deep breathing  Focus on helpful thoughts:  \"This is temporary\", \"I will get through this\", \"Ride the wave\" and remind myself of what is " important to me: family, job  Step 3: People and social settings that provide distraction:   Name: best friend Phone: pt has phone number in his phone  movie theater, coffee shop, park, library and gym    Step 4: Remind myself of people and things that are important to me and worth living for:  Family and my best friends.    Step 5: When I am in crisis, I can ask these people to help me use my safety plan:   Name: father Phone: pt has phone number in phone  Step 6: Making the environment safe:   remove drugs, remove access to firearms: call father to take gun out of house and be around others  Step 7: Professionals or agencies I can contact during a crisis:  Suicide Prevention Lifeline: Call or Text 988   Local Crisis Services: 911, hospital    Call 911 or go to my nearest emergency department.   I helped develop this safety plan and agree to use it when needed.  I have been given a copy of this plan.      Client signature ________Godfrey Porras___________  Today s date:  6/20/2023  Completed by Provider Name/ Credentials:  Jeannette Gilliam, Psychotherapist Trainee, [facility]  June 20, 2023  Adapted from Safety Plan Template 2008 Candy Borges and Mart Yoder is reprinted with the express permission of the authors.  No portion of the Safety Plan Template may be reproduced without the express, written permission.  You can contact the authors at [email or web address] or [email or web address].

## 2023-10-26 DIAGNOSIS — F90.2 ATTENTION DEFICIT HYPERACTIVITY DISORDER (ADHD), COMBINED TYPE: ICD-10-CM

## 2023-10-26 RX ORDER — LISDEXAMFETAMINE DIMESYLATE 20 MG/1
20 CAPSULE ORAL EVERY MORNING
Qty: 30 CAPSULE | Refills: 0 | Status: SHIPPED | OUTPATIENT
Start: 2023-10-26 | End: 2023-12-04

## 2023-10-27 ENCOUNTER — VIRTUAL VISIT (OUTPATIENT)
Dept: PSYCHOLOGY | Facility: CLINIC | Age: 33
End: 2023-10-27
Payer: COMMERCIAL

## 2023-10-27 DIAGNOSIS — F33.1 MAJOR DEPRESSIVE DISORDER, RECURRENT EPISODE, MODERATE WITH ANXIOUS DISTRESS (H): Primary | ICD-10-CM

## 2023-10-27 PROCEDURE — 90834 PSYTX W PT 45 MINUTES: CPT | Mod: VID

## 2023-10-27 NOTE — PROGRESS NOTES
[facility]                                     Progress Note    Patient Name: Godfrey Porras  Date: 10/27/23           Service Type: Individual      Session Start Time: 1:33pm Session End Time: 2:23pm     Session Length: 50 minutes    Session #: 15    Attendees: Client attended alone    Service Modality:  Video Visit:      Provider verified identity through the following two step process.  Patient provided:  Patient is known previously to provider     Telemedicine Visit: The patient's condition can be safely assessed and treated via synchronous audio and visual telemedicine encounter.       Reason for Telemedicine Visit: Patient has requested telehealth visit and Patient convenience (e.g. access to timely appointments / distance to available provider)     Originating Site (Patient Location): Patient's home     Distant Site (Provider Location): Provider's Remote Office Setting     Consent:  The patient/guardian has verbally consented to: the potential risks and benefits of telemedicine (video visit) versus in person care; bill my insurance or make self-payment for services provided; and responsibility for payment of non-covered services.      Patient would like the video invitation sent by:  My Chart     Mode of Communication:  Video Conference via [employer]     Distant Location (Provider):  On-site     As the provider I attest to compliance with applicable laws and regulations related to telemedicine.    DATA  Extended Session (53+ minutes): No  Interactive Complexity: No  Crisis: No        Progress Since Last Session (Related to Symptoms / Goals / Homework):   Symptoms: Improving depressive symptoms since last appointment    Homework: Achieved / completed to satisfaction      Episode of Care Goals: Satisfactory progress - ACTION (Actively working towards change); Intervened by reinforcing change plan / affirming steps taken     Current / Ongoing Stressors and Concerns:   Pt reports ongoing  "stressors as the pain he has been experiencing in his jaw and not being able to sleep well at night. Pt reports mood since last session has \"been better\" than the most recent appointment while still experiencing depressive symptoms. Pt reports he has been able to figure out grad school deadlines, has been coordinating with his advisor for grad school, and was accepted into the  program in [place], [place]. Pt verbalized looking forward to the future and feeling like has good career paths \"either way it goes\". Pt and provider discussed the things to look forward to in the future and the barriers that might keep him back from achieving those dreams. Pt cited his ongoing marijuana use as a potential barrier and discussed further with provider. Pt appears to have gained insight into his desire to understand his use more. Pt appears to be working toward understanding his pattern of use and identifying the \"why\" behind his cravings to use. Pt denies any SI, SIB, or HI since last session. See below for plan of care until next appointment.       Treatment Objective(s) Addressed in This Session:   identify 5 fears / thoughts that contribute to feeling anxious  Increase interest, engagement, and pleasure in doing things  Feel less tired and more energy during the day   Identify negative self-talk and behaviors: challenge core beliefs, myths, and actions  Improve concentration, focus, and mindfulness in daily activities   Strengths based approach to pt's internal motivation for success.      Intervention:   CBT: discussion of how core beliefs of self can influence thinking/behavior patterns related to mental health/substance use, discussion of triggers for cravings to use and behaviors that lead to cravings to use  Motivational Interviewing: evoking change talk, goal planning, increasing internal motivation for change, reframing, OARS    Assessments completed prior to visit:  The following assessments were completed " by patient for this visit:  PROMIS 10-Global Health (all questions and answers displayed):       3/29/2022     9:02 AM 8/8/2022     9:50 AM 5/31/2023    10:16 AM 6/7/2023     1:18 PM   PROMIS 10   In general, would you say your health is: Good Fair  Fair   In general, would you say your quality of life is: Good Good  Good   In general, how would you rate your physical health? Fair Fair  Good   In general, how would you rate your mental health, including your mood and your ability to think? Fair Fair  Poor   In general, how would you rate your satisfaction with your social activities and relationships? Fair Poor  Poor   In general, please rate how well you carry out your usual social activities and roles Poor Good  Poor   To what extent are you able to carry out your everyday physical activities such as walking, climbing stairs, carrying groceries, or moving a chair? Completely Completely  Mostly   In the past 7 days, how often have you been bothered by emotional problems such as feeling anxious, depressed, or irritable? Often Sometimes  Often   In the past 7 days, how would you rate your fatigue on average? Moderate Moderate  Severe   In the past 7 days, how would you rate your pain on average, where 0 means no pain, and 10 means worst imaginable pain? 2 3  3   In general, would you say your health is: 3 2 3 2   In general, would you say your quality of life is: 3 3 3 3   In general, how would you rate your physical health? 2 2 3 3   In general, how would you rate your mental health, including your mood and your ability to think? 2 2 1 1   In general, how would you rate your satisfaction with your social activities and relationships? 2 1 2 1   In general, please rate how well you carry out your usual social activities and roles. (This includes activities at home, at work and in your community, and responsibilities as a parent, child, spouse, employee, friend, etc.) 1 3 2 1   To what extent are you able to carry out  your everyday physical activities such as walking, climbing stairs, carrying groceries, or moving a chair? 5 5 4 4   In the past 7 days, how often have you been bothered by emotional problems such as feeling anxious, depressed, or irritable? 4 3 5 4   In the past 7 days, how would you rate your fatigue on average? 3 3 5 4   In the past 7 days, how would you rate your pain on average, where 0 means no pain, and 10 means worst imaginable pain? 2 3 3 3   Global Mental Health Score 9 9 7 7   Global Physical Health Score 14 14 12 13   PROMIS TOTAL - SUBSCORES 23 23 19 20         ASSESSMENT: Current Emotional / Mental Status (status of significant symptoms):   Risk status (Self / Other harm or suicidal ideation)   Patient denies current fears or concerns for personal safety.   Patient denies current or recent suicidal ideation or behaviors.   Patient denies current or recent homicidal ideation or behaviors.   Patient denies current or recent self injurious behavior or ideation.   Patient denies other safety concerns.   Patient reports there has been no change in risk factors since their last session.     Patient reports there has been no change in protective factors since their last session.     A safety and risk management plan has been developed including: Patient consented to co-developed safety plan on 6/20/23.  Safety and risk management plan was reviewed.   Patient agreed to use safety plan should any safety concerns arise.  A copy was made available to the patient.     Appearance:   Appropriate    Eye Contact:   Good    Psychomotor Behavior: Normal    Attitude:   Cooperative    Orientation:   All   Speech    Rate / Production: Normal     Volume:  Normal    Mood:    Anxious    Affect:    Appropriate    Thought Content:  Clear    Thought Form:  Coherent  Logical    Insight:    Good      Medication Review:   No current psychiatric medications prescribed     Medication Compliance:   Yes     Changes in Health  "Issues:   Yes: Pain, Associated Psychological Distress     Chemical Use Review:   Substance Use: increase in alcohol , cannabis , and cocaine .  Patient reports frequency of use as \"daily for weed\" and a few times a week for alcohol and cocaine. Pt and provider discussed possible interventions for reducing use.  Stage of Change: Preparatory  Reviewed information and resources for treatment and ongoing sobriety  Patient assessed present costs and future losses as a result of substance use  Reviewed concerns related to health related substance abuse risk  Provided encouragement towards sobriety  Provided support and affirmation for steps taken towards sobriety    .   Tobacco Use: No current tobacco use.     Marijuana use pt has identified as the biggest problem, cravings, tolerance, etc.    Diagnosis:  Major Depressive Disorder, Recurrent Episode, Moderate with anxious distress    Collateral Reports Completed:   Not Applicable    PLAN: (Patient Tasks / Therapist Tasks / Other)  Pt is to look into [facility] and come to next session ready to discuss what he likes about it and what his draw backs are.   Pt is to break his to-do list into smaller to-do lists each day so he can feel a sense of accomplishment.  Pt is to document his marijuana use over the next week and identify the motivation/trigger for him to use in the moment. Pt is to come to next session ready to discuss.       Jeannette Gilliam, Psychotherapist Trainee, [facility]  10/27/23    This note has been reviewed and I agree with the plan of care. This note is co-signed by Valentine King MA, [facility] Supervisor, on: 10/31/2023                  ______________________________________________________________________    Individual Treatment Plan    Patient's Name: Godfrey Porras  YOB: 1990    Date of Creation: 6/13/23  Date Treatment Plan Last Reviewed/Revised: 9/19/23    DSM5 Diagnoses: 296.32 (F33.1) Major Depressive Disorder, Recurrent Episode, " Moderate With anxious distress  Psychosocial / Contextual Factors: loss of important relationship, stress at work and stress within important roles, death of close friends  PROMIS (reviewed every 90 days): 20    Referral / Collaboration:  Referral to another professional/service is not indicated at this time..    Anticipated number of session for this episode of care: 9-12 sessions  Anticipation frequency of session: Weekly  Anticipated Duration of each session: 38-52 minutes  Treatment plan will be reviewed in 90 days or when goals have been changed.       MeasurableTreatment Goal(s) related to diagnosis / functional impairment(s)  Goal 1: Patient will decrease his depressive symptoms by 50%.    I will know I've met my goal when I can do things I enjoy doing again.      Objective #A    Patient will Decrease frequency and intensity of feeling down, depressed, hopeless.  Status: Continued - Date(s): 9/19/23    Intervention(s)  Therapist will teach emotion regulation skills and coping skills for his depressive symptoms. Pt will be assigned homework related to practicing skills outside of therapy.    Objective #B  Patient will Identify negative self-talk and behaviors: challenge core beliefs, myths, and actions.   Status: Continued - Date(s): 9/19/23    Intervention(s)  Therapist will teach about thinking patterns and core beliefs/automatic thoughts. Pt will be assigned homework related to recognizing emotions and thinking patterns within himself to discuss during session .    Objective #C  Patient will Decrease thoughts that you'd be better off dead or of suicide / self-harm.   Status: Continued - Date(s): 9/19/23    Intervention(s)  Therapist will teach emotion regulation skills, distress tolerance skills, and thought modification to replace his maladaptive automatic thoughts. Pt will be assigned homework of thought logs and practicing skills outside of therapy .    Objective #D   Patient will use cognitive  "strategies identified in therapy to challenge anxious thoughts    Status: Continued - Date(s): 9/19/23    Intervention(s)  Therapist will assign homework related to thought challenging outside of the therapy room using emotion regulation skills learned .    Objective #E  Patient will identify 5 values that are important to him and the way he lives his life. Pt will identify ways he can live with those values in mind.    Status: Completed - Date: 9/19/23    Intervention(s)  Therapist will introduce ACT and value identification to pt. Pt will be assigned homework related to behavioral activation.    Objective #F  Patient will talk to at least two others about losses and coping.   Status: Continued - Date(s): 9/19/23    Intervention(s)  Therapist will assign homework related to pt reaching out and building stronger social support in his life. .      Patient has reviewed and agreed to the above plan.      Jeannette Gilliam, Psychotherapist Trainee, [facility]    September 19, 2023  __________________________________________________________________________________      [facility]                                       Godfrey Porras     SAFETY PLAN:  Step 1: Warning signs / cues (Thoughts, images, mood, situation, behavior) that a crisis may be developing:  Thoughts: \"People would be better off without me\", \"I'm a burden\", \"I can't do this anymore\" and \"Nothing makes it better\"  Images: flashbacks  Thinking Processes: ruminations (can't stop thinking about my problems): financial concerns and job concerns, racing thoughts, intrusive thoughts (bothersome, unwanted thoughts that come out of nowhere): thoughts of self harm and highly critical and negative thoughts: self judgemental thoughts and self distain  Mood: worsening depression, hopelessness, intense anger and disinhibited (not caring about things or consequences)  Behaviors: isolating/withdrawing , using drugs, using alcohol, aggression, not taking " "care of myself and not taking care of my responsibilities  Situations: relationship problems and financial stress   Step 2: Coping strategies - Things I can do to take my mind off of my problems without contacting another person (relaxation technique, physical activity):  Distress Tolerance Strategies:  relaxation activities: distraction activities in his home and sensory based activities/self-soothe with five senses: doing something that will taste sour and ground self using 5-4-3-2-1  Physical Activities: go for a walk and deep breathing  Focus on helpful thoughts:  \"This is temporary\", \"I will get through this\", \"Ride the wave\" and remind myself of what is important to me: family, job  Step 3: People and social settings that provide distraction:   Name: best friend Phone: pt has phone number in his phone  movie theater, coffee shop, park, library and gym    Step 4: Remind myself of people and things that are important to me and worth living for:  Family and my best friends.    Step 5: When I am in crisis, I can ask these people to help me use my safety plan:   Name: father Phone: pt has phone number in phone  Step 6: Making the environment safe:   remove drugs, remove access to firearms: call father to take gun out of house and be around others  Step 7: Professionals or agencies I can contact during a crisis:  Suicide Prevention Lifeline: Call or Text 988   Local Crisis Services: 911, hospital    Call 911 or go to my nearest emergency department.   I helped develop this safety plan and agree to use it when needed.  I have been given a copy of this plan.      Client signature ________Godfrey Rodriguezmarielenashanell___________  Today s date:  6/20/2023  Completed by Provider Name/ Credentials:  Jeannette Gilliam, Psychotherapist Trainee, [facility]  June 20, 2023  Adapted from Safety Plan Template 2008 Candy Borges and Mart Yoder is reprinted with the express permission of the authors.  No portion of the Safety Plan Template may " be reproduced without the express, written permission.  You can contact the authors at [email or web address] or [email or web address].

## 2023-10-31 ENCOUNTER — DOCUMENTATION ONLY (OUTPATIENT)
Dept: PSYCHOLOGY | Facility: CLINIC | Age: 33
End: 2023-10-31
Payer: COMMERCIAL

## 2023-10-31 DIAGNOSIS — F33.1 MAJOR DEPRESSIVE DISORDER, RECURRENT EPISODE, MODERATE WITH ANXIOUS DISTRESS (H): Primary | ICD-10-CM

## 2023-10-31 NOTE — PROGRESS NOTES
Case Consultation Record       Client Name: Godfrey Porras   Date:  10/31/23    Diagnosis: Major depressive disorder, recurrent episode, moderate with anxious distress (H)  (primary encounter diagnosis)    Therapist: Jeannette Gilliam, Psychotherapist Trainee, [facility]      Team Members Present:  Therapist  Valentine King [facility]    Purpose:   Diagnostic Review and General Review of Treatment    Recommendations:  It is recommended for pt to continue with the plan of reducing substance use and strive for 6 months of sobriety. It is also recommended for pt to explore the strengths he has gained and used during his health concern journey and keep focusing on symptom management vs diagnosis.      Jeannette Gilliam, Psychotherapist Trainee, [facility]  October 31, 2023

## 2023-11-10 ENCOUNTER — PATIENT OUTREACH (OUTPATIENT)
Dept: CARE COORDINATION | Facility: CLINIC | Age: 33
End: 2023-11-10
Payer: COMMERCIAL

## 2023-11-10 NOTE — LETTER
[facility] CARE COORDINATION  [address] [facility] [place] [address]   November 17, 2023    Godfrey Porras  [address]  [place] [address]      Dear Godfrey,    I am a clinical product navigator that works on behalf of [facility]; I wanted to introduce myself and role to you, as I can help you establish care with recommended providers for ongoing care within our health care system.     This role serves as a liaison between [facility]'s clinical network and the health insurance plans to provide guidance on establishing primary and specialty care needs. One of the benefits of having a primary care provider from the network is that your care team will help coordinate your care and guide you through any additional care you may need. Our care team is focused and trained to treat the whole person and can help you with all your physical, emotional, and social concerns.     Also, our Primary Care Clinics are all supported by Clinic Care Coordination:     The clinic care coordination team is made up of a registered nurse, , financial resource worker and community health worker who understand the health care system. The goal of clinic care coordination is to help you manage your health and improve access to the health care system. Our team works alongside your provider to assist you in determining your health and social needs. We can help you obtain health care and community resources, providing you with necessary information and education. We can work with you through any barriers and develop a care plan that helps coordinate and strengthen the communication between you and your care team.  Our services are voluntary and are offered without charge to you personally.    Please feel free to contact me with any questions or concerns regarding care coordination and what we can offer.      We are focused on providing you with the highest-quality healthcare experience  possible.    Sincerely,     Melissa Behl BSN, RN, PHN, CCM  RN Clinical Product Navigator  [phone]

## 2023-11-10 NOTE — PROGRESS NOTES
Clinical Product Navigator RN reviewed chart; patient on payer product coverage.  Review results:   CPN Initial Information Gathering  Referral Source: [employer] Outreach    Patient was identified on internal report; at risk, not enrolled in care coordination, no PCP.    Clinic Care Coordination Contact  [facility]    Clinical Data: Care Coordinator Outreach    Outreach Documentation Number of Outreach Attempt   11/10/2023   3:14 PM 1       Left message on patient's voicemail with call back information and requested return call.    Plan: Care Coordinator will try to reach patient again in 5-10 business days.    Melissa Behl BSN, RN, PHN, [facility]  RN Clinical Product Navigator  Ph: [phone]

## 2023-11-17 NOTE — PROGRESS NOTES
Clinic Care Coordination Contact  [facility]/Voicemail    Clinical Data: Care Coordinator Outreach    Outreach Documentation Number of Outreach Attempt   11/10/2023   3:14 PM 1   11/17/2023  12:11 PM 2       Left message on patient's voicemail with call back information and requested return call.    Plan: Care Coordinator will send care coordination introduction letter with care coordinator contact information and explanation of care coordination services via [facility]. Care Coordinator will do no further outreaches at this time.    Melissa Behl BSN, RN, PHN, [facility]  RN Clinical Product Navigator  [phone]

## 2023-11-21 ENCOUNTER — VIRTUAL VISIT (OUTPATIENT)
Dept: PSYCHOLOGY | Facility: CLINIC | Age: 33
End: 2023-11-21
Payer: COMMERCIAL

## 2023-11-21 DIAGNOSIS — F33.1 MAJOR DEPRESSIVE DISORDER, RECURRENT EPISODE, MODERATE WITH ANXIOUS DISTRESS (H): Primary | ICD-10-CM

## 2023-11-21 PROCEDURE — 90834 PSYTX W PT 45 MINUTES: CPT | Mod: VID

## 2023-11-21 NOTE — PROGRESS NOTES
[facility]                                     Progress Note    Patient Name: Godfrey Porras  Date: 11/21/23           Service Type: Individual      Session Start Time: 4:03pm Session End Time: 4:53pm     Session Length: 50 minutes    Session #: 16    Attendees: Client attended alone    Service Modality:  Video Visit:      Provider verified identity through the following two step process.  Patient provided:  Patient is known previously to provider     Telemedicine Visit: The patient's condition can be safely assessed and treated via synchronous audio and visual telemedicine encounter.       Reason for Telemedicine Visit: Patient has requested telehealth visit and Patient convenience (e.g. access to timely appointments / distance to available provider)     Originating Site (Patient Location): Patient's home     Distant Site (Provider Location): Provider's Remote Office Setting     Consent:  The patient/guardian has verbally consented to: the potential risks and benefits of telemedicine (video visit) versus in person care; bill my insurance or make self-payment for services provided; and responsibility for payment of non-covered services.      Patient would like the video invitation sent by:  My Chart     Mode of Communication:  Video Conference via [facility]     Distant Location (Provider):  On-site     As the provider I attest to compliance with applicable laws and regulations related to telemedicine.    DATA  Extended Session (53+ minutes): No  Interactive Complexity: No  Crisis: No        Progress Since Last Session (Related to Symptoms / Goals / Homework):   Symptoms: Improving overall mental health symptoms since last appointment    Homework: Achieved / completed to satisfaction      Episode of Care Goals: Satisfactory progress - ACTION (Actively working towards change); Intervened by reinforcing change plan / affirming steps taken     Current / Ongoing Stressors and Concerns:   Pt reports  "ongoing stressors as to be navigating the process of getting a different job outside of [employer] and working on improving his mental health. Pt reports mood since last session to be \"overall trending upward\". Pt reports he has been challenging cognitive distortions, been working on cognitive reframing and has been keeping track of his Marijuana use since last session. Pt and provider discussed emotion regulation skills, goals for therapy moving forward, and discussed how instability at various points in life can lead to increased substance use. Pt appears to have gained insight into his desire for change and the growth he has made over the last several weeks. Pt appears to be working toward learning and implementing emotion regulation skills. Pt denies any SI, SIB, or HI since last session. See below for plan of care until next appointment.       Treatment Objective(s) Addressed in This Session:   use cognitive strategies identified in therapy to challenge anxious thoughts  Increase interest, engagement, and pleasure in doing things  Feel less tired and more energy during the day   Identify negative self-talk and behaviors: challenge core beliefs, myths, and actions  Improve concentration, focus, and mindfulness in daily activities   Emotion regulation      Intervention:   CBT: discussion and review of cognitive distortions, review of cognitive strategies for challenging maladaptive thinking, and behavioral activation  DBT: emotion regulation skills introduction and discussion, skills practice  Motivational Interviewing: increasing internal motivation for change, evoking change talk, OARS  Psychodynamic: discussion of how early patterns established in life can contribute to ongoing patterns later in life/may contribute to ongoing substance use    Assessments completed prior to visit:  The following assessments were completed by patient for this visit:  PROMIS 10-Global Health (all questions and answers displayed): "       3/29/2022     9:02 AM 8/8/2022     9:50 AM 5/31/2023    10:16 AM 6/7/2023     1:18 PM   PROMIS 10   In general, would you say your health is: Good Fair  Fair   In general, would you say your quality of life is: Good Good  Good   In general, how would you rate your physical health? Fair Fair  Good   In general, how would you rate your mental health, including your mood and your ability to think? Fair Fair  Poor   In general, how would you rate your satisfaction with your social activities and relationships? Fair Poor  Poor   In general, please rate how well you carry out your usual social activities and roles Poor Good  Poor   To what extent are you able to carry out your everyday physical activities such as walking, climbing stairs, carrying groceries, or moving a chair? Completely Completely  Mostly   In the past 7 days, how often have you been bothered by emotional problems such as feeling anxious, depressed, or irritable? Often Sometimes  Often   In the past 7 days, how would you rate your fatigue on average? Moderate Moderate  Severe   In the past 7 days, how would you rate your pain on average, where 0 means no pain, and 10 means worst imaginable pain? 2 3  3   In general, would you say your health is: 3 2 3 2   In general, would you say your quality of life is: 3 3 3 3   In general, how would you rate your physical health? 2 2 3 3   In general, how would you rate your mental health, including your mood and your ability to think? 2 2 1 1   In general, how would you rate your satisfaction with your social activities and relationships? 2 1 2 1   In general, please rate how well you carry out your usual social activities and roles. (This includes activities at home, at work and in your community, and responsibilities as a parent, child, spouse, employee, friend, etc.) 1 3 2 1   To what extent are you able to carry out your everyday physical activities such as walking, climbing stairs, carrying groceries, or  moving a chair? 5 5 4 4   In the past 7 days, how often have you been bothered by emotional problems such as feeling anxious, depressed, or irritable? 4 3 5 4   In the past 7 days, how would you rate your fatigue on average? 3 3 5 4   In the past 7 days, how would you rate your pain on average, where 0 means no pain, and 10 means worst imaginable pain? 2 3 3 3   Global Mental Health Score 9 9 7 7   Global Physical Health Score 14 14 12 13   PROMIS TOTAL - SUBSCORES 23 23 19 20         ASSESSMENT: Current Emotional / Mental Status (status of significant symptoms):   Risk status (Self / Other harm or suicidal ideation)   Patient denies current fears or concerns for personal safety.   Patient denies current or recent suicidal ideation or behaviors.   Patient denies current or recent homicidal ideation or behaviors.   Patient denies current or recent self injurious behavior or ideation.   Patient denies other safety concerns.   Patient reports there has been no change in risk factors since their last session.     Patient reports there has been no change in protective factors since their last session.     A safety and risk management plan has been developed including: Patient consented to co-developed safety plan on 6/20/23.  Safety and risk management plan was reviewed.   Patient agreed to use safety plan should any safety concerns arise.  A copy was made available to the patient.     Appearance:   Appropriate    Eye Contact:   Good    Psychomotor Behavior: Normal    Attitude:   Cooperative    Orientation:   All   Speech    Rate / Production: Normal     Volume:  Normal    Mood:    Anxious    Affect:    Appropriate  Bright    Thought Content:  Clear    Thought Form:  Coherent  Logical    Insight:    Good      Medication Review:   No current psychiatric medications prescribed     Medication Compliance:   Yes     Changes in Health Issues:   Yes: Pain, Associated Psychological Distress     Chemical Use Review:   Substance  "Use: increase in alcohol , cannabis , and cocaine .  Patient reports frequency of use as \"daily for weed\" and a few times a week for alcohol and cocaine. Pt and provider discussed possible interventions for reducing use.  Stage of Change: Preparatory  Reviewed information and resources for treatment and ongoing sobriety  Patient assessed present costs and future losses as a result of substance use  Reviewed concerns related to health related substance abuse risk  Provided encouragement towards sobriety  Provided support and affirmation for steps taken towards sobriety    .   Tobacco Use: No current tobacco use.     Marijuana use pt has identified as the biggest problem, cravings, tolerance, etc.    Diagnosis:  Major Depressive Disorder, Recurrent Episode, Moderate with anxious distress    Collateral Reports Completed:   Not Applicable    PLAN: (Patient Tasks / Therapist Tasks / Other)  Pt is to continue tracking his marijuana use over the next couple of weeks and come to next appointment ready to discuss.   Pt is to practice and implement at least one emotion regulation skill as discussed during appointment. Pt is to come to next appointment ready to discuss.   Pt is to continue to use cognitive strategies to modify maladaptive thinking patterns and behaviors. Pt is to come to next appointment ready to discuss.      Jeannette Gilliam, Psychotherapist Trainee, [facility]  11/21/23    This note has been reviewed and I agree with the plan of care. This note is co-signed by Valentine King MA, [facility] Supervisor, on: 11/24/2023                    ______________________________________________________________________    Individual Treatment Plan    Patient's Name: Godfrey Porras  YOB: 1990    Date of Creation: 6/13/23  Date Treatment Plan Last Reviewed/Revised: 9/19/23    DSM5 Diagnoses: 296.32 (F33.1) Major Depressive Disorder, Recurrent Episode, Moderate With anxious distress  Psychosocial / Contextual " Factors: loss of important relationship, stress at work and stress within important roles, death of close friends  PROMIS (reviewed every 90 days): 20    Referral / Collaboration:  Referral to another professional/service is not indicated at this time..    Anticipated number of session for this episode of care: 9-12 sessions  Anticipation frequency of session: Weekly  Anticipated Duration of each session: 38-52 minutes  Treatment plan will be reviewed in 90 days or when goals have been changed.       MeasurableTreatment Goal(s) related to diagnosis / functional impairment(s)  Goal 1: Patient will decrease his depressive symptoms by 50%.    I will know I've met my goal when I can do things I enjoy doing again.      Objective #A    Patient will Decrease frequency and intensity of feeling down, depressed, hopeless.  Status: Continued - Date(s): 9/19/23    Intervention(s)  Therapist will teach emotion regulation skills and coping skills for his depressive symptoms. Pt will be assigned homework related to practicing skills outside of therapy.    Objective #B  Patient will Identify negative self-talk and behaviors: challenge core beliefs, myths, and actions.   Status: Continued - Date(s): 9/19/23    Intervention(s)  Therapist will teach about thinking patterns and core beliefs/automatic thoughts. Pt will be assigned homework related to recognizing emotions and thinking patterns within himself to discuss during session .    Objective #C  Patient will Decrease thoughts that you'd be better off dead or of suicide / self-harm.   Status: Continued - Date(s): 9/19/23    Intervention(s)  Therapist will teach emotion regulation skills, distress tolerance skills, and thought modification to replace his maladaptive automatic thoughts. Pt will be assigned homework of thought logs and practicing skills outside of therapy .    Objective #D   Patient will use cognitive strategies identified in therapy to challenge anxious  "thoughts    Status: Continued - Date(s): 9/19/23    Intervention(s)  Therapist will assign homework related to thought challenging outside of the therapy room using emotion regulation skills learned .    Objective #E  Patient will identify 5 values that are important to him and the way he lives his life. Pt will identify ways he can live with those values in mind.    Status: Completed - Date: 9/19/23    Intervention(s)  Therapist will introduce ACT and value identification to pt. Pt will be assigned homework related to behavioral activation.    Objective #F  Patient will talk to at least two others about losses and coping.   Status: Continued - Date(s): 9/19/23    Intervention(s)  Therapist will assign homework related to pt reaching out and building stronger social support in his life. .      Patient has reviewed and agreed to the above plan.      Jeannette Gilliam, Psychotherapist Trainee, [facility]    September 19, 2023  __________________________________________________________________________________      [facility]                                       Godfrey Porras     SAFETY PLAN:  Step 1: Warning signs / cues (Thoughts, images, mood, situation, behavior) that a crisis may be developing:  Thoughts: \"People would be better off without me\", \"I'm a burden\", \"I can't do this anymore\" and \"Nothing makes it better\"  Images: flashbacks  Thinking Processes: ruminations (can't stop thinking about my problems): financial concerns and job concerns, racing thoughts, intrusive thoughts (bothersome, unwanted thoughts that come out of nowhere): thoughts of self harm and highly critical and negative thoughts: self judgemental thoughts and self distain  Mood: worsening depression, hopelessness, intense anger and disinhibited (not caring about things or consequences)  Behaviors: isolating/withdrawing , using drugs, using alcohol, aggression, not taking care of myself and not taking care of my " "responsibilities  Situations: relationship problems and financial stress   Step 2: Coping strategies - Things I can do to take my mind off of my problems without contacting another person (relaxation technique, physical activity):  Distress Tolerance Strategies:  relaxation activities: distraction activities in his home and sensory based activities/self-soothe with five senses: doing something that will taste sour and ground self using 5-4-3-2-1  Physical Activities: go for a walk and deep breathing  Focus on helpful thoughts:  \"This is temporary\", \"I will get through this\", \"Ride the wave\" and remind myself of what is important to me: family, job  Step 3: People and social settings that provide distraction:   Name: best friend Phone: pt has phone number in his phone  movie theater, coffee shop, park, library and gym    Step 4: Remind myself of people and things that are important to me and worth living for:  Family and my best friends.    Step 5: When I am in crisis, I can ask these people to help me use my safety plan:   Name: father Phone: pt has phone number in phone  Step 6: Making the environment safe:   remove drugs, remove access to firearms: call father to take gun out of house and be around others  Step 7: Professionals or agencies I can contact during a crisis:  Suicide Prevention Lifeline: Call or Text 988   Local Crisis Services: 911, hospital    Call 911 or go to my nearest emergency department.   I helped develop this safety plan and agree to use it when needed.  I have been given a copy of this plan.      Client signature ________Godfrey Emmanuelleshanell___________  Today s date:  6/20/2023  Completed by Provider Name/ Credentials:  Jeannette Gilliam, Psychotherapist Trainee, [facility]  June 20, 2023  Adapted from Safety Plan Template 2008 Candy Borges and Mart Yoder is reprinted with the express permission of the authors.  No portion of the Safety Plan Template may be reproduced without the express, " written permission.  You can contact the authors at [email or web address] or [email or web address].

## 2023-12-04 DIAGNOSIS — F90.2 ATTENTION DEFICIT HYPERACTIVITY DISORDER (ADHD), COMBINED TYPE: ICD-10-CM

## 2023-12-04 RX ORDER — LISDEXAMFETAMINE DIMESYLATE 20 MG/1
20 CAPSULE ORAL EVERY MORNING
Qty: 30 CAPSULE | Refills: 0 | Status: SHIPPED | OUTPATIENT
Start: 2023-12-04 | End: 2024-06-24

## 2023-12-04 NOTE — TELEPHONE ENCOUNTER
lisdexamfetamine (VYVANSE) 20 MG capsule       Last Written Prescription Date:  10/26/23  Last Fill Quantity: 30,   # refills: 0  Last Office Visit: 04/21/23  Future Office visit:       Routing refill request to provider for review/approval because:  Drug not on the [facility], [facility] or [facility] refill protocol or controlled substance

## 2023-12-05 ENCOUNTER — VIRTUAL VISIT (OUTPATIENT)
Dept: PSYCHOLOGY | Facility: CLINIC | Age: 33
End: 2023-12-05
Payer: COMMERCIAL

## 2023-12-05 DIAGNOSIS — F33.1 MAJOR DEPRESSIVE DISORDER, RECURRENT EPISODE, MODERATE WITH ANXIOUS DISTRESS (H): Primary | ICD-10-CM

## 2023-12-05 PROCEDURE — 90834 PSYTX W PT 45 MINUTES: CPT | Mod: VID

## 2023-12-05 NOTE — PROGRESS NOTES
[facility]                                     Progress Note    Patient Name: Godfrey Porras  Date: 12/05/23           Service Type: Individual      Session Start Time: 10:06am Session End Time: 10:50am     Session Length: 44 minutes    Session #: 17    Attendees: Client attended alone    Service Modality:  Video Visit:      Provider verified identity through the following two step process.  Patient provided:  Patient is known previously to provider     Telemedicine Visit: The patient's condition can be safely assessed and treated via synchronous audio and visual telemedicine encounter.       Reason for Telemedicine Visit: Patient has requested telehealth visit and Patient convenience (e.g. access to timely appointments / distance to available provider)     Originating Site (Patient Location): Patient's home     Distant Site (Provider Location): [facility]     Consent:  The patient/guardian has verbally consented to: the potential risks and benefits of telemedicine (video visit) versus in person care; bill my insurance or make self-payment for services provided; and responsibility for payment of non-covered services.      Patient would like the video invitation sent by:  My Chart     Mode of Communication:  Video Conference via [facility]     Distant Location (Provider):  On-site     As the provider I attest to compliance with applicable laws and regulations related to telemedicine.    DATA  Extended Session (53+ minutes): No  Interactive Complexity: No  Crisis: No        Progress Since Last Session (Related to Symptoms / Goals / Homework):   Symptoms: Improving depressive and anxiety symptoms since last appointment    Homework: Achieved / completed to satisfaction      Episode of Care Goals: Satisfactory progress - ACTION (Actively working towards change); Intervened by reinforcing change plan / affirming steps  "taken     Current / Ongoing Stressors and Concerns:   Pt reports ongoing stressors as navigating job applications and the \"crazy stuff\" that happened since last session (more explained below). Pt reports mood since last session has been \"pretty good\" as he has felt more motivated to complete tasks and has been taking his medication consistently. Pt reports seeing an old friend who was in active meth induced psychosis which caused pt some stress, and he talked to his ex-girlfriend. While both situations pt described as \"stressful\" he also was able to find closure from situations of the past he was looking for.  Pt and provider discussed emotions related to the events described above, allowing self to be free from guilt associated with past situations, discussion of his steps toward meeting his goals for the future, and what he would like his future goals to be. Pt appears to have gained insight into how approaching himself with kindness and self compassion has allowed himself to gain motivation for doing things during the way. Pt appears to be working toward using the skills related to self compassion consistently and \"staying on top of the job murillo\". Pt denies any SI, SIB, or HI since last session. See below for plan of care until next appointment.       Treatment Objective(s) Addressed in This Session:   use cognitive strategies identified in therapy to challenge anxious thoughts  Identify negative self-talk and behaviors: challenge core beliefs, myths, and actions  Improve concentration, focus, and mindfulness in daily activities   Approaching self with compassion  practice setting boundaries 1 times in the next 2 weeks       Intervention:   CBT: discussion of organization skills related to the link between cognition and behavior, reframing cognitive thoughts of self and challenging maladaptive thinking patterns of self/the world   Motivational Interviewing: increasing internal motivation for change, evoking " change talk, OARS  ACT: approaching self without judgement, accepting the present moment for what it is, creating concrete steps to get closer to the goals set for himself in alignment with his values.    Assessments completed prior to visit:  The following assessments were completed by patient for this visit:  PROMIS 10-Global Health (all questions and answers displayed):       3/29/2022     9:02 AM 8/8/2022     9:50 AM 5/31/2023    10:16 AM 6/7/2023     1:18 PM   PROMIS 10   In general, would you say your health is: Good Fair  Fair   In general, would you say your quality of life is: Good Good  Good   In general, how would you rate your physical health? Fair Fair  Good   In general, how would you rate your mental health, including your mood and your ability to think? Fair Fair  Poor   In general, how would you rate your satisfaction with your social activities and relationships? Fair Poor  Poor   In general, please rate how well you carry out your usual social activities and roles Poor Good  Poor   To what extent are you able to carry out your everyday physical activities such as walking, climbing stairs, carrying groceries, or moving a chair? Completely Completely  Mostly   In the past 7 days, how often have you been bothered by emotional problems such as feeling anxious, depressed, or irritable? Often Sometimes  Often   In the past 7 days, how would you rate your fatigue on average? Moderate Moderate  Severe   In the past 7 days, how would you rate your pain on average, where 0 means no pain, and 10 means worst imaginable pain? 2 3  3   In general, would you say your health is: 3 2 3 2   In general, would you say your quality of life is: 3 3 3 3   In general, how would you rate your physical health? 2 2 3 3   In general, how would you rate your mental health, including your mood and your ability to think? 2 2 1 1   In general, how would you rate your satisfaction with your social activities and relationships? 2  1 2 1   In general, please rate how well you carry out your usual social activities and roles. (This includes activities at home, at work and in your community, and responsibilities as a parent, child, spouse, employee, friend, etc.) 1 3 2 1   To what extent are you able to carry out your everyday physical activities such as walking, climbing stairs, carrying groceries, or moving a chair? 5 5 4 4   In the past 7 days, how often have you been bothered by emotional problems such as feeling anxious, depressed, or irritable? 4 3 5 4   In the past 7 days, how would you rate your fatigue on average? 3 3 5 4   In the past 7 days, how would you rate your pain on average, where 0 means no pain, and 10 means worst imaginable pain? 2 3 3 3   Global Mental Health Score 9 9 7 7   Global Physical Health Score 14 14 12 13   PROMIS TOTAL - SUBSCORES 23 23 19 20         ASSESSMENT: Current Emotional / Mental Status (status of significant symptoms):   Risk status (Self / Other harm or suicidal ideation)   Patient denies current fears or concerns for personal safety.   Patient denies current or recent suicidal ideation or behaviors.   Patient denies current or recent homicidal ideation or behaviors.   Patient denies current or recent self injurious behavior or ideation.   Patient denies other safety concerns.   Patient reports there has been no change in risk factors since their last session.     Patient reports there has been no change in protective factors since their last session.     A safety and risk management plan has been developed including: Patient consented to co-developed safety plan on 6/20/23.  Safety and risk management plan was reviewed.   Patient agreed to use safety plan should any safety concerns arise.  A copy was made available to the patient.     Appearance:   Appropriate    Eye Contact:   Good    Psychomotor Behavior: Normal    Attitude:   Cooperative    Orientation:   All   Speech    Rate / Production: Normal  "    Volume:  Normal    Mood:    Anxious    Affect:    Appropriate  Bright    Thought Content:  Clear    Thought Form:  Coherent  Logical    Insight:    Good      Medication Review:   No changes to current psychiatric medication(s)     Medication Compliance:   Yes     Changes in Health Issues:   None reported     Chemical Use Review:   Substance Use: decrease in alcohol , cannabis , and cocaine .  Patient reports frequency of use as \"a weekly basis instead of a daily basis\" for the alcohol and weed use. Pt denies any cocaine use since last appointment.  Stage of Change: Action  Reviewed information and resources for treatment and ongoing sobriety  Patient assessed present costs and future losses as a result of substance use  Provided support and affirmation for steps taken towards sobriety    .   Tobacco Use: No current tobacco use.         Diagnosis:  Major Depressive Disorder, Recurrent Episode, Moderate with anxious distress    Collateral Reports Completed:   Not Applicable    PLAN: (Patient Tasks / Therapist Tasks / Other)  Pt is to keep taking medication as prescribed and stay consistent with medication.   Pt is to continue to \"stay on top of\" his job hunt and follow up with current prospects.   Pt is to continue to practice the skill of self compassion/approaching himself without judgement and come to next appointment ready to discuss further.        Jeannette Gilliam, Psychotherapist Trainee, [facility]  12/05/23    This note has been reviewed and I agree with the plan of care. This note is co-signed by Valentine King MA, [facility] Supervisor, on: 12/7/2023                    ______________________________________________________________________    Individual Treatment Plan    Patient's Name: Godfrey Porras  YOB: 1990    Date of Creation: 6/13/23  Date Treatment Plan Last Reviewed/Revised: 12/5/23    DSM5 Diagnoses: 296.32 (F33.1) Major Depressive Disorder, Recurrent Episode, Moderate With anxious " distress  Psychosocial / Contextual Factors: loss of important relationship, stress at work and stress within important roles, death of close friends  PROMIS (reviewed every 90 days): 20    Referral / Collaboration:  Referral to another professional/service is not indicated at this time..    Anticipated number of session for this episode of care: 9-12 sessions  Anticipation frequency of session: Weekly  Anticipated Duration of each session: 38-52 minutes  Treatment plan will be reviewed in 90 days or when goals have been changed.       MeasurableTreatment Goal(s) related to diagnosis / functional impairment(s)  Goal 1: Patient will decrease his depressive symptoms by 50%.    I will know I've met my goal when I can do things I enjoy doing again.      Objective #A    Patient will Decrease frequency and intensity of feeling down, depressed, hopeless.  Status: Continued - Date(s): 12/5/23    Intervention(s)  Therapist will teach emotion regulation skills and coping skills for his depressive symptoms. Pt will be assigned homework related to practicing skills outside of therapy.    Objective #B  Patient will Identify negative self-talk and behaviors: challenge core beliefs, myths, and actions.   Status: Continued - Date(s): 12/5/23    Intervention(s)  Therapist will teach about thinking patterns and core beliefs/automatic thoughts. Pt will be assigned homework related to recognizing emotions and thinking patterns within himself to discuss during session .    Objective #C  Patient will Decrease thoughts that you'd be better off dead or of suicide / self-harm.   Status: Completed - Date: 12/5/23    Intervention(s)  Therapist will teach emotion regulation skills, distress tolerance skills, and thought modification to replace his maladaptive automatic thoughts. Pt will be assigned homework of thought logs and practicing skills outside of therapy .    Objective #D   Patient will use cognitive strategies identified in therapy  "to challenge anxious thoughts    Status: Continued - Date(s): 12/5/23    Intervention(s)  Therapist will assign homework related to thought challenging outside of the therapy room using emotion regulation skills learned .    Objective #E  Patient will identify 5 values that are important to him and the way he lives his life. Pt will identify ways he can live with those values in mind.    Status: Completed - Date: 12/5/23    Intervention(s)  Therapist will introduce ACT and value identification to pt. Pt will be assigned homework related to behavioral activation.    Objective #F  Patient will talk to at least two others about losses and coping.   Status: Continued - Date(s): 12/5/23    Intervention(s)  Therapist will assign homework related to pt reaching out and building stronger social support in his life. .      Patient has reviewed and agreed to the above plan.      Jeannette Gilliam, Psychotherapist Trainee, [facility]    December 5, 2023  __________________________________________________________________________________      [facility]                                       Godfrey Porras     SAFETY PLAN:  Step 1: Warning signs / cues (Thoughts, images, mood, situation, behavior) that a crisis may be developing:  Thoughts: \"People would be better off without me\", \"I'm a burden\", \"I can't do this anymore\" and \"Nothing makes it better\"  Images: flashbacks  Thinking Processes: ruminations (can't stop thinking about my problems): financial concerns and job concerns, racing thoughts, intrusive thoughts (bothersome, unwanted thoughts that come out of nowhere): thoughts of self harm and highly critical and negative thoughts: self judgemental thoughts and self distain  Mood: worsening depression, hopelessness, intense anger and disinhibited (not caring about things or consequences)  Behaviors: isolating/withdrawing , using drugs, using alcohol, aggression, not taking care of myself and not taking care of " "my responsibilities  Situations: relationship problems and financial stress   Step 2: Coping strategies - Things I can do to take my mind off of my problems without contacting another person (relaxation technique, physical activity):  Distress Tolerance Strategies:  relaxation activities: distraction activities in his home and sensory based activities/self-soothe with five senses: doing something that will taste sour and ground self using 5-4-3-2-1  Physical Activities: go for a walk and deep breathing  Focus on helpful thoughts:  \"This is temporary\", \"I will get through this\", \"Ride the wave\" and remind myself of what is important to me: family, job  Step 3: People and social settings that provide distraction:   Name: best friend Phone: pt has phone number in his phone  movie theater, coffee shop, park, library and gym    Step 4: Remind myself of people and things that are important to me and worth living for:  Family and my best friends.    Step 5: When I am in crisis, I can ask these people to help me use my safety plan:   Name: father Phone: pt has phone number in phone  Step 6: Making the environment safe:   remove drugs, remove access to firearms: call father to take gun out of house and be around others  Step 7: Professionals or agencies I can contact during a crisis:  Suicide Prevention Lifeline: Call or Text 988   Local Crisis Services: 911, hospital    Call 911 or go to my nearest emergency department.   I helped develop this safety plan and agree to use it when needed.  I have been given a copy of this plan.      Client signature ________Godfrey Rodriguezmarielenashanell___________  Today s date:  6/20/2023  Completed by Provider Name/ Credentials:  Jeannette Gilliam, Psychotherapist Trainee, [facility]  June 20, 2023  Adapted from Safety Plan Template 2008 Candy Borges and Mart Yoder is reprinted with the express permission of the authors.  No portion of the Safety Plan Template may be reproduced without the express, " written permission.  You can contact the authors at [email or web address] or [email or web address].

## 2023-12-19 ENCOUNTER — VIRTUAL VISIT (OUTPATIENT)
Dept: PSYCHOLOGY | Facility: CLINIC | Age: 33
End: 2023-12-19
Payer: COMMERCIAL

## 2023-12-19 DIAGNOSIS — F33.1 MAJOR DEPRESSIVE DISORDER, RECURRENT EPISODE, MODERATE WITH ANXIOUS DISTRESS (H): Primary | ICD-10-CM

## 2023-12-19 PROCEDURE — 90834 PSYTX W PT 45 MINUTES: CPT | Mod: VID

## 2023-12-19 ASSESSMENT — PATIENT HEALTH QUESTIONNAIRE - PHQ9
10. IF YOU CHECKED OFF ANY PROBLEMS, HOW DIFFICULT HAVE THESE PROBLEMS MADE IT FOR YOU TO DO YOUR WORK, TAKE CARE OF THINGS AT HOME, OR GET ALONG WITH OTHER PEOPLE: SOMEWHAT DIFFICULT
SUM OF ALL RESPONSES TO PHQ QUESTIONS 1-9: 8
SUM OF ALL RESPONSES TO PHQ QUESTIONS 1-9: 8

## 2023-12-19 NOTE — PROGRESS NOTES
[facility]                                     Progress Note    Patient Name: Godfrey Porras  Date: 12/19/23           Service Type: Individual      Session Start Time: 2:07pm Session End Time: 2:53pm     Session Length: 46 minutes    Session #: 18    Attendees: Client attended alone    Service Modality:  Video Visit:      Provider verified identity through the following two step process.  Patient provided:  Patient is known previously to provider     Telemedicine Visit: The patient's condition can be safely assessed and treated via synchronous audio and visual telemedicine encounter.       Reason for Telemedicine Visit: Patient has requested telehealth visit and Patient convenience (e.g. access to timely appointments / distance to available provider)     Originating Site (Patient Location): Patient's home     Distant Site (Provider Location): [facility]     Consent:  The patient/guardian has verbally consented to: the potential risks and benefits of telemedicine (video visit) versus in person care; bill my insurance or make self-payment for services provided; and responsibility for payment of non-covered services.      Patient would like the video invitation sent by:  My Chart     Mode of Communication:  Video Conference via [facility]     Distant Location (Provider):  On-site     As the provider I attest to compliance with applicable laws and regulations related to telemedicine.    DATA  Extended Session (53+ minutes): No  Interactive Complexity: No  Crisis: No        Progress Since Last Session (Related to Symptoms / Goals / Homework):   Symptoms: Improving overall symptoms related to mental health    Homework: Achieved / completed to satisfaction      Episode of Care Goals: Satisfactory progress - ACTION (Actively working towards change); Intervened by reinforcing change plan / affirming steps taken     Current /  "Ongoing Stressors and Concerns:   Pt reports ongoing stressors as applying for jobs and navigating various relationships. Pt reports mood since last session has been \"pretty good\" and he has noticed a more \"stable mood\" over the last couple of weeks. Pt reports he has started a hobbie and has continued his job search happening since last session. Pt and provider discussed root of distraction/avoidance, the emotion of \"hope\" pt has been experiencing, and his desire for financial stability. Pt appears to have gained insight into how hope has become a motivator for him to engage in tasks with internal motivation and stronger follow through. Pt appears to be working toward using skills to help manage his ADHD and digger further into the desire to \"get new things\". Pt denies any SI, SIB, or HI since last session. See below for plan of care until next appointment.       Treatment Objective(s) Addressed in This Session:   identify and use 2 strategies to improve organization  initiate 1 social contact(s) per day for increasing lengths of time  Identify negative self-talk and behaviors: challenge core beliefs, myths, and actions  Improve concentration, focus, and mindfulness in daily activities      Intervention:   Emotion Focused Therapy: emotion identification, emotion root discussion, body sensation related to emotion discussion  Solution Focused: discussion related to solutions for immediate concerns/short term goal setting  ADHD focus/organization interventions    Assessments completed prior to visit:  The following assessments were completed by patient for this visit:  PROMIS 10-Global Health (all questions and answers displayed):       3/29/2022     9:02 AM 8/8/2022     9:50 AM 5/31/2023    10:16 AM 6/7/2023     1:18 PM   PROMIS 10   In general, would you say your health is: Good Fair  Fair   In general, would you say your quality of life is: Good Good  Good   In general, how would you rate your physical health? Fair " Fair  Good   In general, how would you rate your mental health, including your mood and your ability to think? Fair Fair  Poor   In general, how would you rate your satisfaction with your social activities and relationships? Fair Poor  Poor   In general, please rate how well you carry out your usual social activities and roles Poor Good  Poor   To what extent are you able to carry out your everyday physical activities such as walking, climbing stairs, carrying groceries, or moving a chair? Completely Completely  Mostly   In the past 7 days, how often have you been bothered by emotional problems such as feeling anxious, depressed, or irritable? Often Sometimes  Often   In the past 7 days, how would you rate your fatigue on average? Moderate Moderate  Severe   In the past 7 days, how would you rate your pain on average, where 0 means no pain, and 10 means worst imaginable pain? 2 3  3   In general, would you say your health is: 3 2 3 2   In general, would you say your quality of life is: 3 3 3 3   In general, how would you rate your physical health? 2 2 3 3   In general, how would you rate your mental health, including your mood and your ability to think? 2 2 1 1   In general, how would you rate your satisfaction with your social activities and relationships? 2 1 2 1   In general, please rate how well you carry out your usual social activities and roles. (This includes activities at home, at work and in your community, and responsibilities as a parent, child, spouse, employee, friend, etc.) 1 3 2 1   To what extent are you able to carry out your everyday physical activities such as walking, climbing stairs, carrying groceries, or moving a chair? 5 5 4 4   In the past 7 days, how often have you been bothered by emotional problems such as feeling anxious, depressed, or irritable? 4 3 5 4   In the past 7 days, how would you rate your fatigue on average? 3 3 5 4   In the past 7 days, how would you rate your pain on  average, where 0 means no pain, and 10 means worst imaginable pain? 2 3 3 3   Global Mental Health Score 9 9 7 7   Global Physical Health Score 14 14 12 13   PROMIS TOTAL - SUBSCORES 23 23 19 20         ASSESSMENT: Current Emotional / Mental Status (status of significant symptoms):   Risk status (Self / Other harm or suicidal ideation)   Patient denies current fears or concerns for personal safety.   Patient denies current or recent suicidal ideation or behaviors.   Patient denies current or recent homicidal ideation or behaviors.   Patient denies current or recent self injurious behavior or ideation.   Patient denies other safety concerns.   Patient reports there has been no change in risk factors since their last session.     Patient reports there has been no change in protective factors since their last session.     A safety and risk management plan has been developed including: Patient consented to co-developed safety plan on 6/20/23.  Safety and risk management plan was reviewed.   Patient agreed to use safety plan should any safety concerns arise.  A copy was made available to the patient.     Appearance:   Appropriate    Eye Contact:   Good    Psychomotor Behavior: Normal    Attitude:   Cooperative    Orientation:   All   Speech    Rate / Production: Normal     Volume:  Normal    Mood:    Anxious    Affect:    Appropriate  Bright    Thought Content:  Clear    Thought Form:  Coherent  Logical    Insight:    Good      Medication Review:   No changes to current psychiatric medication(s)     Medication Compliance:   Yes     Changes in Health Issues:   None reported     Chemical Use Review:   Substance Use: Problem use continues with no change since last session, Stage of Change: Action  Not addressed in session   .   Tobacco Use: No current tobacco use.         Diagnosis:  Major Depressive Disorder, Recurrent Episode, Moderate with anxious distress    Collateral Reports Completed:   Not Applicable    PLAN:  (Patient Tasks / Therapist Tasks / Other)  Pt is to work on setting a timer/alarm when he is sitting down to write job applications as a way to help keep him on track and to bring him back to his task if he gets distracted. Pt is to bring thoughts on task to next appointment for further discussion.  Pt is to think about why the distraction of looking at things to buy is the thing that he feels distracts him most and bring thoughts to next appointment.    Jeannette Gilliam, Psychotherapist Trainee, [facility]  12/19/23    This note has been reviewed and I agree with the plan of care. This note is co-signed by Valentine King MA, [facility] Supervisor, on: 12/20/2023                    ______________________________________________________________________    Individual Treatment Plan    Patient's Name: Godfrey Porras  YOB: 1990    Date of Creation: 6/13/23  Date Treatment Plan Last Reviewed/Revised: 12/5/23    DSM5 Diagnoses: 296.32 (F33.1) Major Depressive Disorder, Recurrent Episode, Moderate With anxious distress  Psychosocial / Contextual Factors: loss of important relationship, stress at work and stress within important roles, death of close friends  PROMIS (reviewed every 90 days): 20    Referral / Collaboration:  Referral to another professional/service is not indicated at this time..    Anticipated number of session for this episode of care: 9-12 sessions  Anticipation frequency of session: Weekly  Anticipated Duration of each session: 38-52 minutes  Treatment plan will be reviewed in 90 days or when goals have been changed.       MeasurableTreatment Goal(s) related to diagnosis / functional impairment(s)  Goal 1: Patient will decrease his depressive symptoms by 50%.    I will know I've met my goal when I can do things I enjoy doing again.      Objective #A    Patient will Decrease frequency and intensity of feeling down, depressed, hopeless.  Status: Continued - Date(s):  12/5/23    Intervention(s)  Therapist will teach emotion regulation skills and coping skills for his depressive symptoms. Pt will be assigned homework related to practicing skills outside of therapy.    Objective #B  Patient will Identify negative self-talk and behaviors: challenge core beliefs, myths, and actions.   Status: Continued - Date(s): 12/5/23    Intervention(s)  Therapist will teach about thinking patterns and core beliefs/automatic thoughts. Pt will be assigned homework related to recognizing emotions and thinking patterns within himself to discuss during session .    Objective #C  Patient will Decrease thoughts that you'd be better off dead or of suicide / self-harm.   Status: Completed - Date: 12/5/23    Intervention(s)  Therapist will teach emotion regulation skills, distress tolerance skills, and thought modification to replace his maladaptive automatic thoughts. Pt will be assigned homework of thought logs and practicing skills outside of therapy .    Objective #D   Patient will use cognitive strategies identified in therapy to challenge anxious thoughts    Status: Continued - Date(s): 12/5/23    Intervention(s)  Therapist will assign homework related to thought challenging outside of the therapy room using emotion regulation skills learned .    Objective #E  Patient will identify 5 values that are important to him and the way he lives his life. Pt will identify ways he can live with those values in mind.    Status: Completed - Date: 12/5/23    Intervention(s)  Therapist will introduce ACT and value identification to pt. Pt will be assigned homework related to behavioral activation.    Objective #F  Patient will talk to at least two others about losses and coping.   Status: Continued - Date(s): 12/5/23    Intervention(s)  Therapist will assign homework related to pt reaching out and building stronger social support in his life. .      Patient has reviewed and agreed to the above plan.      Jeannette  "REMINGTON Gilliam, Psychotherapist Trainee, [facility]    December 5, 2023  __________________________________________________________________________________      [facility]                                       Godfrey Handleyshanell     SAFETY PLAN:  Step 1: Warning signs / cues (Thoughts, images, mood, situation, behavior) that a crisis may be developing:  Thoughts: \"People would be better off without me\", \"I'm a burden\", \"I can't do this anymore\" and \"Nothing makes it better\"  Images: flashbacks  Thinking Processes: ruminations (can't stop thinking about my problems): financial concerns and job concerns, racing thoughts, intrusive thoughts (bothersome, unwanted thoughts that come out of nowhere): thoughts of self harm and highly critical and negative thoughts: self judgemental thoughts and self distain  Mood: worsening depression, hopelessness, intense anger and disinhibited (not caring about things or consequences)  Behaviors: isolating/withdrawing , using drugs, using alcohol, aggression, not taking care of myself and not taking care of my responsibilities  Situations: relationship problems and financial stress   Step 2: Coping strategies - Things I can do to take my mind off of my problems without contacting another person (relaxation technique, physical activity):  Distress Tolerance Strategies:  relaxation activities: distraction activities in his home and sensory based activities/self-soothe with five senses: doing something that will taste sour and ground self using 5-4-3-2-1  Physical Activities: go for a walk and deep breathing  Focus on helpful thoughts:  \"This is temporary\", \"I will get through this\", \"Ride the wave\" and remind myself of what is important to me: family, job  Step 3: People and social settings that provide distraction:   Name: best friend Phone: pt has phone number in his phone  movie theater, coffee shop, park, library and gym    Step 4: Remind myself of people and things that are " important to me and worth living for:  Family and my best friends.    Step 5: When I am in crisis, I can ask these people to help me use my safety plan:   Name: father Phone: pt has phone number in phone  Step 6: Making the environment safe:   remove drugs, remove access to firearms: call father to take gun out of house and be around others  Step 7: Professionals or agencies I can contact during a crisis:  Suicide Prevention Lifeline: Call or Text 988   Local Crisis Services: 911, hospital    Call 911 or go to my nearest emergency department.   I helped develop this safety plan and agree to use it when needed.  I have been given a copy of this plan.      Client signature ________Jacob Dasilveira___________  Today s date:  6/20/2023  Completed by Provider Name/ Credentials:  Jeannette Gilliam, Psychotherapist Trainee, [facility]  June 20, 2023  Adapted from Safety Plan Template 2008 Candy Borges and Mart Yoder is reprinted with the express permission of the authors.  No portion of the Safety Plan Template may be reproduced without the express, written permission.  You can contact the authors at [email or web address] or [email or web address].

## 2024-01-24 ENCOUNTER — PATIENT OUTREACH (OUTPATIENT)
Dept: CARE COORDINATION | Facility: CLINIC | Age: 34
End: 2024-01-24

## 2024-01-24 NOTE — PROGRESS NOTES
Clinical Product Navigator RN reviewed chart; patient on payer product coverage.  Review results:   CPN Initial Information Gathering  Referral Source: Pro-Active Outreach    Patient identified via Epic report as at risk patient with no PCP. RN CC connected with patient. Patient states he considers Gely Bedoya NP to be his PCP. Chart updated. RN TUAN offered assistance in scheduling his AWV, however, patient states he doesn't have insurance coverage and is looking for a job at this time. Patient understands that once he has insurance again, he can call to schedule at his convenience. No further CPN outreach at this time.    Indira Joe RN CC  Casual Clinical Product Navigator Coverage

## 2024-05-20 ENCOUNTER — TELEPHONE (OUTPATIENT)
Dept: FAMILY MEDICINE | Facility: CLINIC | Age: 34
End: 2024-05-20
Payer: COMMERCIAL

## 2024-05-20 NOTE — LETTER
[facility]           [address] [place] [address]  [phone]  May 20, 2024    Godfrey Porras  [address]  [place] [address]    Dear Godfrey,    This is a reminder that you are due for a Wellness Visit (annual full physical).   So that you get your desired appointment time, please call [phone] to schedule this or you can use [employer] if you have an account. If you have had this visit completed elsewhere, or you believe you received this letter in error, please contact us at [phone].    In addition, here is a list of due or overdue Health Maintenance reminders.    Health Maintenance Due   Topic Date Due    Pneumococcal Vaccine (2 of 2 - PCV) 04/10/2018    Hepatitis B Vaccine (2 of 3 - 19+ 3-dose series) 02/14/2022    Yearly Preventive Visit  01/17/2023    Asthma Action Plan - yearly  01/17/2023    COVID-19 Vaccine (3 - 2023-24 season) 09/01/2023    Asthma Control Test  10/21/2023    DEPRESSION 12 MO INDEX REPEAT PHQ-9  04/01/2024    ANNUAL REVIEW OF HM ORDERS  04/21/2024    Depression Assessment  06/19/2024     Have a great day!      Best Regards,    Gely Bedoya, SARAHP-BC

## 2024-05-20 NOTE — TELEPHONE ENCOUNTER
Needs of attention regarding:  -Asthma  -Depression  -Wellness (Physical) Visit     Health Maintenance Topics with due status: Overdue       Topic Date Due    Pneumococcal Vaccine: Pediatrics (0 to 5 Years) and At-Risk Patients (6 to 64 Years) 04/10/2018    HEPATITIS B IMMUNIZATION 02/14/2022    YEARLY PREVENTIVE VISIT 01/17/2023    ASTHMA ACTION PLAN 01/17/2023    COVID-19 Vaccine 09/01/2023    ASTHMA CONTROL TEST 10/21/2023    DEPRESSION 12 MO INDEX REPEAT PHQ-9 04/01/2024    ANNUAL REVIEW OF HM ORDERS 04/21/2024     Health Maintenance Topics with due status: Due Soon       Topic Date Due    PHQ-9 06/19/2024       Communication:  See Letter

## 2024-05-26 ENCOUNTER — HEALTH MAINTENANCE LETTER (OUTPATIENT)
Age: 34
End: 2024-05-26

## 2024-06-24 DIAGNOSIS — F90.2 ATTENTION DEFICIT HYPERACTIVITY DISORDER (ADHD), COMBINED TYPE: ICD-10-CM

## 2024-06-24 RX ORDER — LISDEXAMFETAMINE DIMESYLATE 20 MG/1
20 CAPSULE ORAL EVERY MORNING
Qty: 30 CAPSULE | Refills: 0 | Status: SHIPPED | OUTPATIENT
Start: 2024-06-24

## 2024-06-24 NOTE — TELEPHONE ENCOUNTER
Medication Question or Refill    new insurance with [employer] - already gave information    What medication are you calling about (include dose and sig)?:     lisdexamfetamine (VYVANSE) 20 MG capsule       Preferred Pharmacy:     [facility]  [address] [place] [address]  [id number]  [phone]        Controlled Substance Agreement on file:   CSA -- Patient Level:    CSA: None found at the patient level.       Who prescribed the medication?: shea    Do you need a refill? Yes    When did you use the medication last? today    Patient offered an appointment? No    Do you have any questions or concerns?  No      Could we send this information to you in [place] or would you prefer to receive a phone call?:   Patient would prefer a phone call   Okay to leave a detailed message?: Yes at Cell number on file:    Telephone Information:   Mobile [phone]         Jacqueline MARLOW

## 2024-07-24 ENCOUNTER — VIRTUAL VISIT (OUTPATIENT)
Dept: PSYCHOLOGY | Facility: CLINIC | Age: 34
End: 2024-07-24
Payer: COMMERCIAL

## 2024-07-24 DIAGNOSIS — F33.1 MAJOR DEPRESSIVE DISORDER, RECURRENT EPISODE, MODERATE WITH ANXIOUS DISTRESS (H): Primary | ICD-10-CM

## 2024-07-24 PROCEDURE — 90834 PSYTX W PT 45 MINUTES: CPT | Mod: 95

## 2024-07-24 NOTE — PROGRESS NOTES
[facility]                                     Progress Note    Patient Name: Godfrey Porras  Date: 7/24/24           Service Type: Individual      Session Start Time: 12:36pm Session End Time: 1:18pm     Session Length: 42 minutes    Session #: 19    Attendees: Client attended alone    Service Modality:  Video Visit:      Provider verified identity through the following two step process.  Patient provided:  Patient is known previously to provider     Telemedicine Visit: The patient's condition can be safely assessed and treated via synchronous audio and visual telemedicine encounter.       Reason for Telemedicine Visit: Patient has requested telehealth visit and Patient convenience (e.g. access to timely appointments / distance to available provider)     Originating Site (Patient Location): Patient's home     Distant Site (Provider Location): Secure location at remote home office     Consent:  The patient/guardian has verbally consented to: the potential risks and benefits of telemedicine (video visit) versus in person care; bill my insurance or make self-payment for services provided; and responsibility for payment of non-covered services.      Patient would like the video invitation sent by:  My Chart     Mode of Communication:  Video Conference via [facility]     Distant Location (Provider):  On-site     As the provider I attest to compliance with applicable laws and regulations related to telemedicine.    DATA  Extended Session (53+ minutes): No  Interactive Complexity: No  Crisis: No        Progress Since Last Session (Related to Symptoms / Goals / Homework):   Symptoms: Worsening mental health symptoms since last appointment    Homework: Achieved / completed to satisfaction      Episode of Care Goals: Satisfactory progress - ACTION (Actively working towards change); Intervened by reinforcing change plan / affirming steps taken     Current / Ongoing Stressors and Concerns:   Pt reports  "ongoing stressors as navigating various relationships and roles in different areas of life. Pt reports mood since last session has been \"okay\" as he was \"doing great at the beginning of the year\" and then \"backslid starting in February with Marijuana use\". Pt shared events that took place since last session and processed emotions. Pt and provider discussed the new treatment plan, goals for therapy, introduction to ACT and values discussion, and making changes inline with values. Pt appears to have gained insight into how marijuana has been impacting various areas of his life. Pt appears to be working toward reducing marijuana use. Pt denies any SI, SIB, or HI since last session. See below for plan of care until next appointment.     Treatment Objective(s) Addressed in This Session:   identify at least 3 triggers for anxiety  Increase interest, engagement, and pleasure in doing things  Decrease frequency and intensity of feeling down, depressed, hopeless  Identify negative self-talk and behaviors: challenge core beliefs, myths, and actions  Improve concentration, focus, and mindfulness in daily activities   Feel less fidgety, restless or slow in daily activities / interpersonal interactions  Identifying triggers for marijuana use     Intervention:   CBT: identifying maladaptive thinking patterns, challenging thinking patterns, behavioral activation  Motivational Interviewing: identifying goals to work toward, increasing internal motivation for sustained change, harm reduction approach to substance use to increase sustainable change, evoking change talk, brief action planning, OARS, reflecting and reframing  ACT: introduction to and values discussion    Assessments completed prior to visit:  The following assessments were completed by patient for this visit:  PROMIS 10-Global Health (all questions and answers displayed):       3/29/2022     9:02 AM 8/8/2022     9:50 AM 5/31/2023    10:16 AM 6/7/2023     1:18 PM " 12/19/2023     2:03 PM   PROMIS 10   In general, would you say your health is: Good Fair  Fair Good   In general, would you say your quality of life is: Good Good  Good Good   In general, how would you rate your physical health? Fair Fair  Good Good   In general, how would you rate your mental health, including your mood and your ability to think? Fair Fair  Poor Good   In general, how would you rate your satisfaction with your social activities and relationships? Fair Poor  Poor Fair   In general, please rate how well you carry out your usual social activities and roles Poor Good  Poor Good   To what extent are you able to carry out your everyday physical activities such as walking, climbing stairs, carrying groceries, or moving a chair? Completely Completely  Mostly Completely   In the past 7 days, how often have you been bothered by emotional problems such as feeling anxious, depressed, or irritable? Often Sometimes  Often Sometimes   In the past 7 days, how would you rate your fatigue on average? Moderate Moderate  Severe Mild   In the past 7 days, how would you rate your pain on average, where 0 means no pain, and 10 means worst imaginable pain? 2 3  3 3   In general, would you say your health is: 3 2 3 2 3   In general, would you say your quality of life is: 3 3 3 3 3   In general, how would you rate your physical health? 2 2 3 3 3   In general, how would you rate your mental health, including your mood and your ability to think? 2 2 1 1 3   In general, how would you rate your satisfaction with your social activities and relationships? 2 1 2 1 2   In general, please rate how well you carry out your usual social activities and roles. (This includes activities at home, at work and in your community, and responsibilities as a parent, child, spouse, employee, friend, etc.) 1 3 2 1 3   To what extent are you able to carry out your everyday physical activities such as walking, climbing stairs, carrying groceries, or  moving a chair? 5 5 4 4 5   In the past 7 days, how often have you been bothered by emotional problems such as feeling anxious, depressed, or irritable? 4 3 5 4 3   In the past 7 days, how would you rate your fatigue on average? 3 3 5 4 2   In the past 7 days, how would you rate your pain on average, where 0 means no pain, and 10 means worst imaginable pain? 2 3 3 3 3   Global Mental Health Score 9 9 7 7 11   Global Physical Health Score 14 14 12 13 16   PROMIS TOTAL - SUBSCORES 23 23 19 20 27         ASSESSMENT: Current Emotional / Mental Status (status of significant symptoms):   Risk status (Self / Other harm or suicidal ideation)   Patient denies current fears or concerns for personal safety.   Patient denies current or recent suicidal ideation or behaviors.   Patient denies current or recent homicidal ideation or behaviors.   Patient denies current or recent self injurious behavior or ideation.   Patient denies other safety concerns.   Patient reports there has been no change in risk factors since their last session.     Patient reports there has been no change in protective factors since their last session.     A safety and risk management plan has been developed including: Patient consented to co-developed safety plan on 6/20/23.  Safety and risk management plan was reviewed.   Patient agreed to use safety plan should any safety concerns arise.  A copy was made available to the patient.     Appearance:   Appropriate    Eye Contact:   Good    Psychomotor Behavior: Normal    Attitude:   Cooperative    Orientation:   All   Speech    Rate / Production: Normal     Volume:  Normal    Mood:    Anxious    Affect:    Appropriate    Thought Content:  Clear    Thought Form:  Coherent  Logical    Insight:    Good      Medication Review:   No changes to current psychiatric medication(s)     Medication Compliance:   Yes     Changes in Health Issues:   None reported     Chemical Use Review:   Substance Use: Problem use  "continues with no change since last session, Stage of Change: Preparatory  Reviewed information and resources for treatment and ongoing sobriety  Patient assessed present costs and future losses as a result of substance use  Reviewed concerns related to health related substance abuse risk  Provided encouragement towards sobriety  Provided support and affirmation for steps taken towards sobriety    .   Tobacco Use: No current tobacco use.         Diagnosis:  Major Depressive Disorder, Recurrent Episode, Moderate with anxious distress    Collateral Reports Completed:   Not Applicable    PLAN: (Patient Tasks / Therapist Tasks / Other)  Pt is to work on reducing marijuana intake by starting to reduce the amount he is buying as well as putting it out of sight when he is at home. Pt is to remind self that \"I am at my best when I am not smoking\".  Pt is to implement coping skills and distraction skills as discussed during session.      Jeannette Gilliam, Psychotherapist Trainee, [facility]  7/24/24    This note has been reviewed and I agree with the plan of care. This note is co-signed by Valentine King MA, [facility] Supervisor, on: 7/25/2024                        ______________________________________________________________________    Individual Treatment Plan    Patient's Name: Godfrey Porras  YOB: 1990    Date of Creation: 6/13/23  Date Treatment Plan Last Reviewed/Revised: 7/24/24    DSM5 Diagnoses: 296.32 (F33.1) Major Depressive Disorder, Recurrent Episode, Moderate With anxious distress  Psychosocial / Contextual Factors: loss of important relationship, stress at work and stress within important roles, death of close friends  PROMIS (reviewed every 90 days): 27    Referral / Collaboration:  Referral to another professional/service is not indicated at this time..    Anticipated number of session for this episode of care: 9-12 sessions  Anticipation frequency of session: Weekly  Anticipated Duration of each " session: 38-52 minutes  Treatment plan will be reviewed in 90 days or when goals have been changed.       MeasurableTreatment Goal(s) related to diagnosis / functional impairment(s)  Goal 1: Patient will decrease his depressive symptoms by 50%.    I will know I've met my goal when I can do things I enjoy doing again.      Objective #A    Patient will Decrease frequency and intensity of feeling down, depressed, hopeless.  Status: Continued - Date(s): 7/24/24    Intervention(s)  Therapist will teach emotion regulation skills and coping skills for his depressive symptoms. Pt will be assigned homework related to practicing skills outside of therapy.    Objective #B  Patient will Identify negative self-talk and behaviors: challenge core beliefs, myths, and actions.   Status: Continued - Date(s): 7/24/24    Intervention(s)  Therapist will teach about thinking patterns and core beliefs/automatic thoughts. Pt will be assigned homework related to recognizing emotions and thinking patterns within himself to discuss during session .    Objective #C  Patient will Decrease thoughts that you'd be better off dead or of suicide / self-harm.   Status: Completed - Date: 12/5/23    Intervention(s)  Therapist will teach emotion regulation skills, distress tolerance skills, and thought modification to replace his maladaptive automatic thoughts. Pt will be assigned homework of thought logs and practicing skills outside of therapy .    Objective #D   Patient will use cognitive strategies identified in therapy to challenge anxious thoughts    Status: Continued - Date(s): 7/24/24    Intervention(s)  Therapist will assign homework related to thought challenging outside of the therapy room using emotion regulation skills learned .    Objective #E  Patient will identify 5 values that are important to him and the way he lives his life. Pt will identify ways he can live with those values in mind.    Status: Completed - Date:  "12/5/23    Intervention(s)  Therapist will introduce ACT and value identification to pt. Pt will be assigned homework related to behavioral activation.    Objective #F  Patient will talk to at least two others about losses and coping.   Status: Continued - Date(s): 7/24/24    Intervention(s)  Therapist will assign homework related to pt reaching out and building stronger social support in his life. .    Objective #G  Pt will reduce marijuana use over the next 12 weeks from daily use to a few times per week at night. Pt will stop using marijuana use during the day specifically.   Status: New - Date(s): 7/24/24    Intervention(s)  Pt will identify motivations behind marijuana use and work on implementing harm reduction strategies for lowering marijuana use.    Objective #H  Patient will increase motivation to engage in activities previously enjoyed over the next 12 weeks.  Status: New - Date(s): 7/24/24    Intervention(s)  Therapist will assign homework related to pt engaging in activities he has enjoyed doing while tracking mood before, during and after activities.      Patient has reviewed and agreed to the above plan.      Jeannette Gilliam, Psychotherapist Trainee, [facility]    7/24/24  __________________________________________________________________________________      [facility]                                       Godfrey Porras     SAFETY PLAN:  Step 1: Warning signs / cues (Thoughts, images, mood, situation, behavior) that a crisis may be developing:  Thoughts: \"People would be better off without me\", \"I'm a burden\", \"I can't do this anymore\" and \"Nothing makes it better\"  Images: flashbacks  Thinking Processes: ruminations (can't stop thinking about my problems): financial concerns and job concerns, racing thoughts, intrusive thoughts (bothersome, unwanted thoughts that come out of nowhere): thoughts of self harm and highly critical and negative thoughts: self judgemental thoughts and " "self distain  Mood: worsening depression, hopelessness, intense anger and disinhibited (not caring about things or consequences)  Behaviors: isolating/withdrawing , using drugs, using alcohol, aggression, not taking care of myself and not taking care of my responsibilities  Situations: relationship problems and financial stress   Step 2: Coping strategies - Things I can do to take my mind off of my problems without contacting another person (relaxation technique, physical activity):  Distress Tolerance Strategies:  relaxation activities: distraction activities in his home and sensory based activities/self-soothe with five senses: doing something that will taste sour and ground self using 5-4-3-2-1  Physical Activities: go for a walk and deep breathing  Focus on helpful thoughts:  \"This is temporary\", \"I will get through this\", \"Ride the wave\" and remind myself of what is important to me: family, job  Step 3: People and social settings that provide distraction:   Name: best friend Phone: pt has phone number in his phone  movie theater, coffee shop, park, library and gym    Step 4: Remind myself of people and things that are important to me and worth living for:  Family and my best friends.    Step 5: When I am in crisis, I can ask these people to help me use my safety plan:   Name: father Phone: pt has phone number in phone  Step 6: Making the environment safe:   remove drugs, remove access to firearms: call father to take gun out of house and be around others  Step 7: Professionals or agencies I can contact during a crisis:  Suicide Prevention Lifeline: Call or Text 988   Local Crisis Services: 911, hospital    Call 911 or go to my nearest emergency department.   I helped develop this safety plan and agree to use it when needed.  I have been given a copy of this plan.      Client signature ________Godfrey Emmanuelleshanell___________  Today s date:  6/20/2023  Completed by Provider Name/ Credentials:  Jeannette Gilliam, " Psychotherapist Trainee, [facility]  June 20, 2023  Adapted from Safety Plan Template 2008 Candy Borges and Mart Yoder is reprinted with the express permission of the authors.  No portion of the Safety Plan Template may be reproduced without the express, written permission.  You can contact the authors at [email or web address] or [email or web address].

## 2024-07-26 ENCOUNTER — OFFICE VISIT (OUTPATIENT)
Dept: FAMILY MEDICINE | Facility: CLINIC | Age: 34
End: 2024-07-26
Payer: COMMERCIAL

## 2024-07-26 VITALS
TEMPERATURE: 97.4 F | WEIGHT: 194.3 LBS | OXYGEN SATURATION: 99 % | DIASTOLIC BLOOD PRESSURE: 70 MMHG | BODY MASS INDEX: 26.32 KG/M2 | RESPIRATION RATE: 24 BRPM | HEART RATE: 71 BPM | SYSTOLIC BLOOD PRESSURE: 122 MMHG | HEIGHT: 72 IN

## 2024-07-26 DIAGNOSIS — F90.2 ATTENTION DEFICIT HYPERACTIVITY DISORDER (ADHD), COMBINED TYPE: ICD-10-CM

## 2024-07-26 DIAGNOSIS — Z13.220 SCREENING CHOLESTEROL LEVEL: ICD-10-CM

## 2024-07-26 DIAGNOSIS — Z13.1 SCREENING FOR DIABETES MELLITUS: ICD-10-CM

## 2024-07-26 DIAGNOSIS — J45.40 MODERATE PERSISTENT ASTHMA WITHOUT COMPLICATION: ICD-10-CM

## 2024-07-26 DIAGNOSIS — Z00.00 ROUTINE PHYSICAL EXAMINATION: Primary | ICD-10-CM

## 2024-07-26 LAB
ALBUMIN SERPL BCG-MCNC: 4.7 G/DL (ref 3.5–5.2)
ALP SERPL-CCNC: 88 U/L (ref 40–150)
ALT SERPL W P-5'-P-CCNC: 24 U/L (ref 0–70)
AMPHETAMINES UR QL SCN: ABNORMAL
ANION GAP SERPL CALCULATED.3IONS-SCNC: 10 MMOL/L (ref 7–15)
AST SERPL W P-5'-P-CCNC: 39 U/L (ref 0–45)
BARBITURATES UR QL SCN: ABNORMAL
BENZODIAZ UR QL SCN: ABNORMAL
BILIRUB SERPL-MCNC: 0.6 MG/DL
BUN SERPL-MCNC: 15.4 MG/DL (ref 6–20)
BZE UR QL SCN: ABNORMAL
CALCIUM SERPL-MCNC: 9.9 MG/DL (ref 8.8–10.4)
CANNABINOIDS UR QL SCN: ABNORMAL
CHLORIDE SERPL-SCNC: 105 MMOL/L (ref 98–107)
CHOLEST SERPL-MCNC: 196 MG/DL
CREAT SERPL-MCNC: 1.15 MG/DL (ref 0.67–1.17)
EGFRCR SERPLBLD CKD-EPI 2021: 86 ML/MIN/1.73M2
FASTING STATUS PATIENT QL REPORTED: ABNORMAL
FASTING STATUS PATIENT QL REPORTED: NORMAL
FENTANYL UR QL: ABNORMAL
GLUCOSE SERPL-MCNC: 84 MG/DL (ref 70–99)
HCO3 SERPL-SCNC: 25 MMOL/L (ref 22–29)
HDLC SERPL-MCNC: 50 MG/DL
LDLC SERPL CALC-MCNC: 130 MG/DL
NONHDLC SERPL-MCNC: 146 MG/DL
OPIATES UR QL SCN: ABNORMAL
PCP QUAL URINE (ROCHE): ABNORMAL
POTASSIUM SERPL-SCNC: 4.6 MMOL/L (ref 3.4–5.3)
PROT SERPL-MCNC: 7.6 G/DL (ref 6.4–8.3)
SODIUM SERPL-SCNC: 140 MMOL/L (ref 135–145)
TRIGL SERPL-MCNC: 80 MG/DL

## 2024-07-26 PROCEDURE — 80061 LIPID PANEL: CPT | Performed by: NURSE PRACTITIONER

## 2024-07-26 PROCEDURE — 80307 DRUG TEST PRSMV CHEM ANLYZR: CPT | Performed by: NURSE PRACTITIONER

## 2024-07-26 PROCEDURE — 80053 COMPREHEN METABOLIC PANEL: CPT | Performed by: NURSE PRACTITIONER

## 2024-07-26 PROCEDURE — 36415 COLL VENOUS BLD VENIPUNCTURE: CPT | Performed by: NURSE PRACTITIONER

## 2024-07-26 PROCEDURE — 96127 BRIEF EMOTIONAL/BEHAV ASSMT: CPT | Performed by: NURSE PRACTITIONER

## 2024-07-26 PROCEDURE — 99395 PREV VISIT EST AGE 18-39: CPT | Performed by: NURSE PRACTITIONER

## 2024-07-26 RX ORDER — LISDEXAMFETAMINE DIMESYLATE 20 MG/1
20 CAPSULE ORAL DAILY
Qty: 30 CAPSULE | Refills: 0 | Status: SHIPPED | OUTPATIENT
Start: 2024-09-24 | End: 2024-10-24

## 2024-07-26 RX ORDER — FLUTICASONE PROPIONATE AND SALMETEROL XINAFOATE 115; 21 UG/1; UG/1
2 AEROSOL, METERED RESPIRATORY (INHALATION) 2 TIMES DAILY
Qty: 36 G | Refills: 11 | Status: SHIPPED | OUTPATIENT
Start: 2024-07-26

## 2024-07-26 RX ORDER — LISDEXAMFETAMINE DIMESYLATE 20 MG/1
20 CAPSULE ORAL EVERY MORNING
Qty: 30 CAPSULE | Refills: 0 | Status: CANCELLED | OUTPATIENT
Start: 2024-07-26

## 2024-07-26 RX ORDER — LISDEXAMFETAMINE DIMESYLATE 20 MG/1
20 CAPSULE ORAL DAILY
Qty: 30 CAPSULE | Refills: 0 | Status: SHIPPED | OUTPATIENT
Start: 2024-07-26 | End: 2024-08-25

## 2024-07-26 RX ORDER — ALBUTEROL SULFATE 90 UG/1
2 AEROSOL, METERED RESPIRATORY (INHALATION) EVERY 4 HOURS PRN
Qty: 18 G | Refills: 5 | Status: SHIPPED | OUTPATIENT
Start: 2024-07-26

## 2024-07-26 RX ORDER — LISDEXAMFETAMINE DIMESYLATE 20 MG/1
20 CAPSULE ORAL DAILY
Qty: 30 CAPSULE | Refills: 0 | Status: SHIPPED | OUTPATIENT
Start: 2024-08-25 | End: 2024-09-24

## 2024-07-26 SDOH — HEALTH STABILITY: PHYSICAL HEALTH: ON AVERAGE, HOW MANY DAYS PER WEEK DO YOU ENGAGE IN MODERATE TO STRENUOUS EXERCISE (LIKE A BRISK WALK)?: 0 DAYS

## 2024-07-26 ASSESSMENT — ASTHMA QUESTIONNAIRES
QUESTION_5 LAST FOUR WEEKS HOW WOULD YOU RATE YOUR ASTHMA CONTROL: POORLY CONTROLLED
QUESTION_4 LAST FOUR WEEKS HOW OFTEN HAVE YOU USED YOUR RESCUE INHALER OR NEBULIZER MEDICATION (SUCH AS ALBUTEROL): ONE OR TWO TIMES PER DAY
QUESTION_2 LAST FOUR WEEKS HOW OFTEN HAVE YOU HAD SHORTNESS OF BREATH: ONCE A DAY
QUESTION_3 LAST FOUR WEEKS HOW OFTEN DID YOUR ASTHMA SYMPTOMS (WHEEZING, COUGHING, SHORTNESS OF BREATH, CHEST TIGHTNESS OR PAIN) WAKE YOU UP AT NIGHT OR EARLIER THAN USUAL IN THE MORNING: TWO OR THREE NIGHTS A WEEK
QUESTION_1 LAST FOUR WEEKS HOW MUCH OF THE TIME DID YOUR ASTHMA KEEP YOU FROM GETTING AS MUCH DONE AT WORK, SCHOOL OR AT HOME: A LITTLE OF THE TIME
ACT_TOTALSCORE: 12
ACT_TOTALSCORE: 12

## 2024-07-26 ASSESSMENT — ANXIETY QUESTIONNAIRES
7. FEELING AFRAID AS IF SOMETHING AWFUL MIGHT HAPPEN: NOT AT ALL
3. WORRYING TOO MUCH ABOUT DIFFERENT THINGS: MORE THAN HALF THE DAYS
5. BEING SO RESTLESS THAT IT IS HARD TO SIT STILL: NOT AT ALL
4. TROUBLE RELAXING: NEARLY EVERY DAY
GAD7 TOTAL SCORE: 11
8. IF YOU CHECKED OFF ANY PROBLEMS, HOW DIFFICULT HAVE THESE MADE IT FOR YOU TO DO YOUR WORK, TAKE CARE OF THINGS AT HOME, OR GET ALONG WITH OTHER PEOPLE?: SOMEWHAT DIFFICULT
GAD7 TOTAL SCORE: 11
GAD7 TOTAL SCORE: 11
1. FEELING NERVOUS, ANXIOUS, OR ON EDGE: NEARLY EVERY DAY
7. FEELING AFRAID AS IF SOMETHING AWFUL MIGHT HAPPEN: NOT AT ALL
IF YOU CHECKED OFF ANY PROBLEMS ON THIS QUESTIONNAIRE, HOW DIFFICULT HAVE THESE PROBLEMS MADE IT FOR YOU TO DO YOUR WORK, TAKE CARE OF THINGS AT HOME, OR GET ALONG WITH OTHER PEOPLE: SOMEWHAT DIFFICULT
6. BECOMING EASILY ANNOYED OR IRRITABLE: SEVERAL DAYS
2. NOT BEING ABLE TO STOP OR CONTROL WORRYING: MORE THAN HALF THE DAYS

## 2024-07-26 ASSESSMENT — PAIN SCALES - GENERAL: PAINLEVEL: NO PAIN (0)

## 2024-07-26 ASSESSMENT — SOCIAL DETERMINANTS OF HEALTH (SDOH): HOW OFTEN DO YOU GET TOGETHER WITH FRIENDS OR RELATIVES?: TWICE A WEEK

## 2024-07-26 ASSESSMENT — PATIENT HEALTH QUESTIONNAIRE - PHQ9
SUM OF ALL RESPONSES TO PHQ QUESTIONS 1-9: 6
SUM OF ALL RESPONSES TO PHQ QUESTIONS 1-9: 6
10. IF YOU CHECKED OFF ANY PROBLEMS, HOW DIFFICULT HAVE THESE PROBLEMS MADE IT FOR YOU TO DO YOUR WORK, TAKE CARE OF THINGS AT HOME, OR GET ALONG WITH OTHER PEOPLE: SOMEWHAT DIFFICULT

## 2024-07-26 NOTE — LETTER
My Asthma Action Plan    Name: Godfrey Porras   YOB: 1990  Date: 7/26/2024   My doctor: Gely Bedoya CNP   My clinic: [facility]        My Rescue Medicine:   Albuterol inhaler (Proair/Ventolin/Proventil HFA)  2-4 puffs EVERY 4 HOURS as needed. Use a spacer if recommended by your provider.   My Asthma Severity:   Moderate Persistent  Know your asthma triggers: upper respiratory infections  None          GREEN ZONE   Good Control  I feel good  No cough or wheeze  Can work, sleep and play without asthma symptoms       Take your asthma control medicine every day.     If exercise triggers your asthma, take your rescue medication  15 minutes before exercise or sports, and  During exercise if you have asthma symptoms  Spacer to use with inhaler: If you have a spacer, make sure to use it with your inhaler             YELLOW ZONE Getting Worse  I have ANY of these:  I do not feel good  Cough or wheeze  Chest feels tight  Wake up at night   Keep taking your Green Zone medications  Start taking your rescue medicine:  every 20 minutes for up to 1 hour. Then every 4 hours for 24-48 hours.  If you stay in the Yellow Zone for more than 12-24 hours, contact your doctor.  If you do not return to the Green Zone in 12-24 hours or you get worse, start taking your oral steroid medicine if prescribed by your provider.           RED ZONE Medical Alert - Get Help  I have ANY of these:  I feel awful  Medicine is not helping  Breathing getting harder  Trouble walking or talking  Nose opens wide to breathe       Take your rescue medicine NOW  If your provider has prescribed an oral steroid medicine, start taking it NOW  Call your doctor NOW  If you are still in the Red Zone after 20 minutes and you have not reached your doctor:  Take your rescue medicine again and  Call 911 or go to the emergency room right away    See your regular doctor within 2 weeks of an Emergency Room or Urgent Care visit for  follow-up treatment.          Annual Reminders:  Meet with Asthma Educator,  Flu Shot in the Fall, consider Pneumonia Vaccination for patients with asthma (aged 19 and older).    Pharmacy:    [facility] [place], [place] - [address]  [id number] - [facility], [place] - [address] AT [facility] [place] [id number] - [place] - [address] [facility] [address] [id number] - [place] - [address] [id number] - [place], [place] - [address] [facility] [address] [facility][id number] - [place], [place] - [address]  [id number] - [place], [place] - [address]    Electronically signed by Gely Bedoya CNP   Date: 07/26/24                    Asthma Triggers  How To Control Things That Make Your Asthma Worse    Triggers are things that make your asthma worse.  Look at the list below to help you find your triggers and   what you can do about them. You can help prevent asthma flare-ups by staying away from your triggers.      Trigger                                                          What you can do   Cigarette Smoke  Tobacco smoke can make asthma worse. Do not allow smoking in your home, car or around you.  Be sure no one smokes at a child s day care or school.  If you smoke, ask your health care provider for ways to help you quit.  Ask family members to quit too.  Ask your health care provider for a referral to Quit Plan to help you quit smoking, or call 9-510-788-PLAN.     Colds, Flu, Bronchitis  These are common triggers of asthma. Wash your hands often.  Don t touch your eyes, nose or mouth.  Get a flu shot every year.     Dust Mites  These are tiny bugs that live in cloth or carpet. They are too small to see. Wash sheets and blankets in hot water every week.   Encase pillows and  mattress in dust mite proof covers.  Avoid having carpet if you can. If you have carpet, vacuum weekly.   Use a dust mask and HEPA vacuum.   Pollen and Outdoor Mold  Some people are allergic to trees, grass, or weed pollen, or molds. Try to keep your windows closed.  Limit time out doors when pollen count is high.   Ask you health care provider about taking medicine during allergy season.     Animal Dander  Some people are allergic to skin flakes, urine or saliva from pets with fur or feathers. Keep pets with fur or feathers out of your home.    If you can t keep the pet outdoors, then keep the pet out of your bedroom.  Keep the bedroom door closed.  Keep pets off cloth furniture and away from stuffed toys.     Mice, Rats, and Cockroaches  Some people are allergic to the waste from these pests.   Cover food and garbage.  Clean up spills and food crumbs.  Store grease in the refrigerator.   Keep food out of the bedroom.   Indoor Mold  This can be a trigger if your home has high moisture. Fix leaking faucets, pipes, or other sources of water.   Clean moldy surfaces.  Dehumidify basement if it is damp and smelly.   Smoke, Strong Odors, and Sprays  These can reduce air quality. Stay away from strong odors and sprays, such as perfume, powder, hair spray, paints, smoke incense, paint, cleaning products, candles and new carpet.   Exercise or Sports  Some people with asthma have this trigger. Be active!  Ask your doctor about taking medicine before sports or exercise to prevent symptoms.    Warm up for 5-10 minutes before and after sports or exercise.     Other Triggers of Asthma  Cold air:  Cover your nose and mouth with a scarf.  Sometimes laughing or crying can be a trigger.  Some medicines and food can trigger asthma.
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
## 2024-07-26 NOTE — LETTER
[facility] [place]  07/26/24  Patient: Godfrey Porras  YOB: 1990  Medical Record Number: [id number]                                                                                  Non-Opioid Controlled Substance Agreement    This is an agreement between you and your provider regarding safe and appropriate use of controlled substances prescribed by your care team. Controlled substances are?medicines that can cause physical and mental dependence (abuse).     There are strict laws about having and using these medicines. We here at [facility] are  committed to working with you in your efforts to get better. To support you in this work, we'll help you schedule regular office appointments for medicine refills. If we must cancel or change your appointment for any reason, we'll make sure you have enough medicine to last until your next appointment.     As a Provider, I will:   Listen carefully to your concerns while treating you with respect.   Recommend a treatment plan that I believe is in your best interest and may involve therapies other than medicine.    Talk with you often about the possible benefits and the risk of harm of any medicine that we prescribe for you.  Assess the safety of this medicine and check how well it works.    Provide a plan on how to taper (discontinue or go off) using this medicine if the decision is made to stop its use.      ::  As a Patient, I understand controlled substances:     Are prescribed by my care provider to help me function or work and manage my condition(s).?  Are strong medicines and can cause serious side effects.     Need to be taken exactly as prescribed.?Combining controlled substances with certain medicines or chemicals (such as illegal drugs, alcohol, sedatives, sleeping pills, and benzodiazepines) can be dangerous or even fatal.? If I stop taking my medicines suddenly, I may have severe withdrawal symptoms.     The risks, benefits,  and side effects of these medicine(s) were explained to me. I agree that:    I will take part in other treatments as advised by my care team. This may be psychiatry or counseling, physical therapy, behavioral therapy, group treatment or a referral to specialist.    I will keep all my appointments and understand this is part of the monitoring of controlled substances.?My care team may require an office visit for EVERY controlled substance refill. If I miss appointments or don t follow instructions, my care team may stop my medicine    I will take my medicines as prescribed. I will not change the dose or schedule unless my care team tells me to. There will be no refills if I run out early.      I may be asked to come to the clinic and complete a urine drug test or complete a pill count. If I don t give a urine sample or participate in a pill count, the care team may stop my medicine.    I will only receive controlled substance prescriptions from this clinic. If I am treated by another provider, I will tell them that I am taking controlled substances and that I have a treatment agreement with this provider. I will inform my [facility] care team within one business day if I am given a prescription for any controlled substance by another healthcare provider. My [facility] care team can contact other providers and pharmacists about my use of any medicines.    It is up to me to make sure that I don't run out of my medicines on weekends or holidays.?If my care team is willing to refill my prescription without a visit, I must request refills only during office hours. Refills may take up to 3 business days to process. I will use one pharmacy to fill all my controlled substance prescriptions. I will notify the clinic about any changes to my insurance or medicine availability.    I am responsible for my prescriptions. If the medicine/prescription is lost, stolen or destroyed, it will not be replaced.?I also agree  not to share controlled substance medicines with anyone.     I am aware I should not use any illegal or recreational drugs. I agree not to drink alcohol unless my care team says I can.     If I enroll in the [place] Medical Cannabis program, I will tell my care team before my next refill.    I will tell my care team right away if I become pregnant, have a new medical problem treated outside of my regular clinic, or have a change in my medicines.     I understand that this medicine can affect my thinking, judgment and reaction time.? Alcohol and drugs affect the brain and body, which can affect the safety of my driving. Being under the influence of alcohol or drugs can affect my decision-making, behaviors, personal safety and the safety of others. Driving while impaired (DWI) can occur if a person is driving, operating or in physical control of a car, motorcycle, boat, snowmobile, ATV, motorbike, off-road vehicle or any other motor vehicle ([place] Statute 169A.20). I understand the risk if I choose to drive or operate any vehicle or machinery.    I understand that if I do not follow any of the conditions above, my prescriptions or treatment may be stopped or changed.   I agree that my provider, clinic care team and pharmacy may work with any city, state or federal law enforcement agency that investigates the misuse, sale or other diversion of my controlled medicine. I will allow my provider to discuss my care with, or share a copy of, this agreement with any other treating provider, pharmacy or emergency room where I receive care.     I have read this agreement and have asked questions about anything I did not understand.    ________________________________________________________  Patient Signature - Godfrey Porras     ___________________                   Date     ________________________________________________________  Provider Signature - Gely Bedoya, CNP       ___________________                   Date      ________________________________________________________  Witness Signature (required if provider not present while patient signing)          ___________________                   Date

## 2024-08-19 ENCOUNTER — VIRTUAL VISIT (OUTPATIENT)
Dept: PSYCHOLOGY | Facility: CLINIC | Age: 34
End: 2024-08-19
Payer: COMMERCIAL

## 2024-08-19 DIAGNOSIS — F33.1 MAJOR DEPRESSIVE DISORDER, RECURRENT EPISODE, MODERATE WITH ANXIOUS DISTRESS (H): Primary | ICD-10-CM

## 2024-08-19 PROCEDURE — 90834 PSYTX W PT 45 MINUTES: CPT | Mod: 95

## 2024-08-19 NOTE — PROGRESS NOTES
[facility]                                     Progress Note    Patient Name: Godfrey Porras  Date: 8/19/24           Service Type: Individual      Session Start Time: 12:33pm Session End Time: 1:24pm     Session Length: 51 minutes    Session #: 20    Attendees: Client attended alone    Service Modality:  Video Visit:      Provider verified identity through the following two step process.  Patient provided:  Patient is known previously to provider     Telemedicine Visit: The patient's condition can be safely assessed and treated via synchronous audio and visual telemedicine encounter.       Reason for Telemedicine Visit: Patient has requested telehealth visit and Patient convenience (e.g. access to timely appointments / distance to available provider)     Originating Site (Patient Location): Patient's home     Distant Site (Provider Location): Secure location at remote home office     Consent:  The patient/guardian has verbally consented to: the potential risks and benefits of telemedicine (video visit) versus in person care; bill my insurance or make self-payment for services provided; and responsibility for payment of non-covered services.      Patient would like the video invitation sent by:  My Chart     Mode of Communication:  Video Conference via [facility]     Distant Location (Provider):  On-site     As the provider I attest to compliance with applicable laws and regulations related to telemedicine.    DATA  Extended Session (53+ minutes): No  Interactive Complexity: No  Crisis: No        Progress Since Last Session (Related to Symptoms / Goals / Homework):   Symptoms: Improving mental health symptoms since last appointment    Homework: Achieved / completed to satisfaction      Episode of Care Goals: Satisfactory progress - ACTION (Actively working towards change); Intervened by reinforcing change plan / affirming steps taken     Current / Ongoing Stressors and Concerns:   Pt reports  "ongoing stressors as navigating various relationships and roles in different areas of life. Pt reports mood since last session has included feeling \"pretty good\" as he has been \"busy\". Pt shared events that took place since last session and processed emotions. Pt and provider discussed reducing marijuana use, managing anxiety symptoms, financial plan he has to get himself out of debt, routine/schedule he has been sticking to, and challenging maladaptive thinking patterns. Pt appears to have gained insight into how thinking patterns related to marijuana use are \"keeping me stuck\" and how he would like to challenge those/redirect his thinking. Pt appears to be working toward being present in various areas of his life and challenging maladaptive thinking patterns. Pt denies any SI, SIB, or HI since last session. See below for plan of care until next appointment.     Treatment Objective(s) Addressed in This Session:   use cognitive strategies identified in therapy to challenge anxious thoughts  Increase interest, engagement, and pleasure in doing things  Decrease frequency and intensity of feeling down, depressed, hopeless  Improve quantity and quality of night time sleep / decrease daytime naps  Identify negative self-talk and behaviors: challenge core beliefs, myths, and actions  Improve concentration, focus, and mindfulness in daily activities   Feel less fidgety, restless or slow in daily activities / interpersonal interactions  Reducing marijuana use     Intervention:   CBT: challenging maladaptive thinking patterns, behavioral activation, scheduling self to do things he enjoys doing  Motivational Interviewing: increasing internal motivation for change, evoking change talk, OARS, reflecting and reframing, brief action planning  ACT: creating change based in values, discussion of how values can influence sustainable change    Assessments completed prior to visit:  The following assessments were completed by patient " for this visit:  PROMIS 10-Global Health (all questions and answers displayed):       3/29/2022     9:02 AM 8/8/2022     9:50 AM 5/31/2023    10:16 AM 6/7/2023     1:18 PM 12/19/2023     2:03 PM   PROMIS 10   In general, would you say your health is: Good Fair  Fair Good   In general, would you say your quality of life is: Good Good  Good Good   In general, how would you rate your physical health? Fair Fair  Good Good   In general, how would you rate your mental health, including your mood and your ability to think? Fair Fair  Poor Good   In general, how would you rate your satisfaction with your social activities and relationships? Fair Poor  Poor Fair   In general, please rate how well you carry out your usual social activities and roles Poor Good  Poor Good   To what extent are you able to carry out your everyday physical activities such as walking, climbing stairs, carrying groceries, or moving a chair? Completely Completely  Mostly Completely   In the past 7 days, how often have you been bothered by emotional problems such as feeling anxious, depressed, or irritable? Often Sometimes  Often Sometimes   In the past 7 days, how would you rate your fatigue on average? Moderate Moderate  Severe Mild   In the past 7 days, how would you rate your pain on average, where 0 means no pain, and 10 means worst imaginable pain? 2 3  3 3   In general, would you say your health is: 3 2 3 2 3   In general, would you say your quality of life is: 3 3 3 3 3   In general, how would you rate your physical health? 2 2 3 3 3   In general, how would you rate your mental health, including your mood and your ability to think? 2 2 1 1 3   In general, how would you rate your satisfaction with your social activities and relationships? 2 1 2 1 2   In general, please rate how well you carry out your usual social activities and roles. (This includes activities at home, at work and in your community, and responsibilities as a parent, child,  spouse, employee, friend, etc.) 1 3 2 1 3   To what extent are you able to carry out your everyday physical activities such as walking, climbing stairs, carrying groceries, or moving a chair? 5 5 4 4 5   In the past 7 days, how often have you been bothered by emotional problems such as feeling anxious, depressed, or irritable? 4 3 5 4 3   In the past 7 days, how would you rate your fatigue on average? 3 3 5 4 2   In the past 7 days, how would you rate your pain on average, where 0 means no pain, and 10 means worst imaginable pain? 2 3 3 3 3   Global Mental Health Score 9 9 7 7 11   Global Physical Health Score 14 14 12 13 16   PROMIS TOTAL - SUBSCORES 23 23 19 20 27          ASSESSMENT: Current Emotional / Mental Status (status of significant symptoms):   Risk status (Self / Other harm or suicidal ideation)   Patient denies current fears or concerns for personal safety.   Patient denies current or recent suicidal ideation or behaviors.   Patient denies current or recent homicidal ideation or behaviors.   Patient denies current or recent self injurious behavior or ideation.   Patient denies other safety concerns.   Patient reports there has been no change in risk factors since their last session.     Patient reports there has been no change in protective factors since their last session.     A safety and risk management plan has been developed including: Patient consented to co-developed safety plan on 6/20/23.  Safety and risk management plan was reviewed.   Patient agreed to use safety plan should any safety concerns arise.  A copy was made available to the patient.     Appearance:   Appropriate    Eye Contact:   Good    Psychomotor Behavior: Normal    Attitude:   Cooperative    Orientation:   All   Speech    Rate / Production: Normal     Volume:  Normal    Mood:    Anxious    Affect:    Appropriate  Bright    Thought Content:  Clear    Thought Form:  Coherent  Logical    Insight:    Good      Medication  Review:   No changes to current psychiatric medication(s)     Medication Compliance:   Yes     Changes in Health Issues:   None reported     Chemical Use Review:   Substance Use: Problem use continues with no change since last session, Stage of Change: Preparatory and Action  Reviewed information and resources for treatment and ongoing sobriety  Patient assessed present costs and future losses as a result of substance use  Reviewed concerns related to health related substance abuse risk  Provided encouragement towards sobriety  Provided support and affirmation for steps taken towards sobriety    .   Tobacco Use: No current tobacco use.         Diagnosis:  Major Depressive Disorder, Recurrent Episode, Moderate with anxious distress    Collateral Reports Completed:   Not Applicable    PLAN: (Patient Tasks / Therapist Tasks / Other)  Pt is to challenge maladaptive thinking patterns and self judgmental thinking patterns as discussed in session. Pt is to bring observations and thoughts to next appointment for further discussion.   Pt is to put a physical reminder of his goals and thinking patterns when he has cravings for marijuana. Pt is to bring thoughts and experiences to next appointment for further discussion.          Jeannette Gilliam, Psychotherapist Trainee, [facility]  8/19/24    There has been demonstrated improvement in functioning while patient has been engaged in psychotherapy/psychological service- if withdrawn the patient would deteriorate and/or relapse.     This note has been reviewed and I agree with the plan of care. This note is co-signed by Valentine King MA, [facility] Supervisor, on: 8/20/2024          ______________________________________________________________________    Individual Treatment Plan    Patient's Name: Godfrey Porras  YOB: 1990    Date of Creation: 6/13/23  Date Treatment Plan Last Reviewed/Revised: 7/24/24    DSM5 Diagnoses: 296.32 (F33.1) Major Depressive Disorder,  Recurrent Episode, Moderate With anxious distress  Psychosocial / Contextual Factors: loss of important relationship, stress at work and stress within important roles, death of close friends  PROMIS (reviewed every 90 days): 27    Referral / Collaboration:  Referral to another professional/service is not indicated at this time..    Anticipated number of session for this episode of care: 9-12 sessions  Anticipation frequency of session: Weekly  Anticipated Duration of each session: 38-52 minutes  Treatment plan will be reviewed in 90 days or when goals have been changed.       MeasurableTreatment Goal(s) related to diagnosis / functional impairment(s)  Goal 1: Patient will decrease his depressive symptoms by 50%.    I will know I've met my goal when I can do things I enjoy doing again.      Objective #A    Patient will Decrease frequency and intensity of feeling down, depressed, hopeless.  Status: Continued - Date(s): 7/24/24    Intervention(s)  Therapist will teach emotion regulation skills and coping skills for his depressive symptoms. Pt will be assigned homework related to practicing skills outside of therapy.    Objective #B  Patient will Identify negative self-talk and behaviors: challenge core beliefs, myths, and actions.   Status: Continued - Date(s): 7/24/24    Intervention(s)  Therapist will teach about thinking patterns and core beliefs/automatic thoughts. Pt will be assigned homework related to recognizing emotions and thinking patterns within himself to discuss during session .    Objective #C  Patient will Decrease thoughts that you'd be better off dead or of suicide / self-harm.   Status: Completed - Date: 12/5/23    Intervention(s)  Therapist will teach emotion regulation skills, distress tolerance skills, and thought modification to replace his maladaptive automatic thoughts. Pt will be assigned homework of thought logs and practicing skills outside of therapy .    Objective #D   Patient will use  cognitive strategies identified in therapy to challenge anxious thoughts    Status: Continued - Date(s): 7/24/24    Intervention(s)  Therapist will assign homework related to thought challenging outside of the therapy room using emotion regulation skills learned .    Objective #E  Patient will identify 5 values that are important to him and the way he lives his life. Pt will identify ways he can live with those values in mind.    Status: Completed - Date: 12/5/23    Intervention(s)  Therapist will introduce ACT and value identification to pt. Pt will be assigned homework related to behavioral activation.    Objective #F  Patient will talk to at least two others about losses and coping.   Status: Continued - Date(s): 7/24/24    Intervention(s)  Therapist will assign homework related to pt reaching out and building stronger social support in his life. .    Objective #G  Pt will reduce marijuana use over the next 12 weeks from daily use to a few times per week at night. Pt will stop using marijuana use during the day specifically.   Status: New - Date(s): 7/24/24    Intervention(s)  Pt will identify motivations behind marijuana use and work on implementing harm reduction strategies for lowering marijuana use.    Objective #H  Patient will increase motivation to engage in activities previously enjoyed over the next 12 weeks.  Status: New - Date(s): 7/24/24    Intervention(s)  Therapist will assign homework related to pt engaging in activities he has enjoyed doing while tracking mood before, during and after activities.      Patient has reviewed and agreed to the above plan.      Jeannette Gilliam, Psychotherapist Trainee, [facility]    7/24/24  __________________________________________________________________________________       [facility]                                       Godfrey Porras     SAFETY PLAN:  Step 1: Warning signs / cues (Thoughts, images, mood, situation, behavior) that a crisis may  "be developing:  Thoughts: \"People would be better off without me\", \"I'm a burden\", \"I can't do this anymore\" and \"Nothing makes it better\"  Images: flashbacks  Thinking Processes: ruminations (can't stop thinking about my problems): financial concerns and job concerns, racing thoughts, intrusive thoughts (bothersome, unwanted thoughts that come out of nowhere): thoughts of self harm and highly critical and negative thoughts: self judgemental thoughts and self distain  Mood: worsening depression, hopelessness, intense anger and disinhibited (not caring about things or consequences)  Behaviors: isolating/withdrawing , using drugs, using alcohol, aggression, not taking care of myself and not taking care of my responsibilities  Situations: relationship problems and financial stress   Step 2: Coping strategies - Things I can do to take my mind off of my problems without contacting another person (relaxation technique, physical activity):  Distress Tolerance Strategies:  relaxation activities: distraction activities in his home and sensory based activities/self-soothe with five senses: doing something that will taste sour and ground self using 5-4-3-2-1  Physical Activities: go for a walk and deep breathing  Focus on helpful thoughts:  \"This is temporary\", \"I will get through this\", \"Ride the wave\" and remind myself of what is important to me: family, job  Step 3: People and social settings that provide distraction:   Name: best friend Phone: pt has phone number in his phone  movie theater, coffee shop, park, library and gym    Step 4: Remind myself of people and things that are important to me and worth living for:  Family and my best friends.    Step 5: When I am in crisis, I can ask these people to help me use my safety plan:   Name: father Phone: pt has phone number in phone  Step 6: Making the environment safe:   remove drugs, remove access to firearms: call father to take gun out of house and be around others  Step " 7: Professionals or agencies I can contact during a crisis:  Suicide Prevention Lifeline: Call or Text 988   Local Crisis Services: 911, hospital    Call 911 or go to my nearest emergency department.   I helped develop this safety plan and agree to use it when needed.  I have been given a copy of this plan.      Client signature ________Godfrey Porras___________  Today s date:  6/20/2023  Completed by Provider Name/ Credentials:  Jeannette Gilliam, Psychotherapist Trainee, [facility]  June 20, 2023  Adapted from Safety Plan Template 2008 Candy Borges and Mart Yoder is reprinted with the express permission of the authors.  No portion of the Safety Plan Template may be reproduced without the express, written permission.  You can contact the authors at [email or web address] or [email or web address].

## 2024-09-16 ENCOUNTER — VIRTUAL VISIT (OUTPATIENT)
Dept: PSYCHOLOGY | Facility: CLINIC | Age: 34
End: 2024-09-16
Payer: COMMERCIAL

## 2024-09-16 DIAGNOSIS — F33.1 MAJOR DEPRESSIVE DISORDER, RECURRENT EPISODE, MODERATE WITH ANXIOUS DISTRESS (H): Primary | ICD-10-CM

## 2024-09-16 PROCEDURE — 90834 PSYTX W PT 45 MINUTES: CPT | Mod: 95

## 2024-09-16 NOTE — PROGRESS NOTES
[facility]                                     Progress Note    Patient Name: Godfrey Porras  Date: 9/16/24           Service Type: Individual      Session Start Time: 11:03am Session End Time: 11:50am     Session Length: 47 minutes    Session #: 21    Attendees: Client attended alone    Service Modality:  Video Visit:      Provider verified identity through the following two step process.  Patient provided:  Patient is known previously to provider     Telemedicine Visit: The patient's condition can be safely assessed and treated via synchronous audio and visual telemedicine encounter.       Reason for Telemedicine Visit: Patient has requested telehealth visit and Patient convenience (e.g. access to timely appointments / distance to available provider)     Originating Site (Patient Location): Patient's home     Distant Site (Provider Location): Secure location at remote home office     Consent:  The patient/guardian has verbally consented to: the potential risks and benefits of telemedicine (video visit) versus in person care; bill my insurance or make self-payment for services provided; and responsibility for payment of non-covered services.      Patient would like the video invitation sent by:  My Chart     Mode of Communication:  Video Conference via [facility]     Distant Location (Provider):  On-site     As the provider I attest to compliance with applicable laws and regulations related to telemedicine.    DATA  Extended Session (53+ minutes): No  Interactive Complexity: No  Crisis: No        Progress Since Last Session (Related to Symptoms / Goals / Homework):   Symptoms: No change in mental health symptoms since last appointment    Homework: Achieved / completed to satisfaction      Episode of Care Goals: Satisfactory progress - ACTION (Actively working towards change); Intervened by reinforcing change plan / affirming steps taken     Current / Ongoing Stressors and Concerns:   Pt  "reports ongoing stressors as navigating various relationships and roles in different areas of life. Pt reports mood since last session has included \"feeling pretty good\" as well as being \"a little anxious\". Pt shared events that took place since last session and processed emotions. Pt and provider discussed relationships, goals, reflecting on the feeling of hope, emotions under cravings for substances, and self compassion. Pt appears to have gained insight into how cravings are fueled by emotions and situations vs \"just being there\". Pt appears to be working toward reflecting on strong emotions that are present, triggers that caused them to arise, and acceptance of himself without judgment. Pt denies any SI, SIB, or HI since last session. See below for plan of care until next appointment.     Treatment Objective(s) Addressed in This Session:   use cognitive strategies identified in therapy to challenge anxious thoughts  Increase interest, engagement, and pleasure in doing things  Feel less tired and more energy during the day   Identify negative self-talk and behaviors: challenge core beliefs, myths, and actions  Improve concentration, focus, and mindfulness in daily activities   Feel less fidgety, restless or slow in daily activities / interpersonal interactions  track and record at least 5 pleasant exchanges with various relationships across life  Emotions under cravings for substances     Intervention:   CBT: challenging maladaptive thinking patterns, behavioral activation, scheduling self to do things he enjoys doing  Motivational Interviewing: increasing internal motivation for change, evoking change talk, OARS, reflecting and reframing, brief action planning  Psychodynamic: identifying how childhood relationship norms are still present in various relationships/the way he approaches relationships in life  ACT: creating change based in values, discussion of how values can influence sustainable " change    Assessments completed prior to visit:  The following assessments were completed by patient for this visit:  PROMIS 10-Global Health (all questions and answers displayed):       3/29/2022     9:02 AM 8/8/2022     9:50 AM 5/31/2023    10:16 AM 6/7/2023     1:18 PM 12/19/2023     2:03 PM   PROMIS 10   In general, would you say your health is: Good Fair  Fair Good   In general, would you say your quality of life is: Good Good  Good Good   In general, how would you rate your physical health? Fair Fair  Good Good   In general, how would you rate your mental health, including your mood and your ability to think? Fair Fair  Poor Good   In general, how would you rate your satisfaction with your social activities and relationships? Fair Poor  Poor Fair   In general, please rate how well you carry out your usual social activities and roles Poor Good  Poor Good   To what extent are you able to carry out your everyday physical activities such as walking, climbing stairs, carrying groceries, or moving a chair? Completely Completely  Mostly Completely   In the past 7 days, how often have you been bothered by emotional problems such as feeling anxious, depressed, or irritable? Often Sometimes  Often Sometimes   In the past 7 days, how would you rate your fatigue on average? Moderate Moderate  Severe Mild   In the past 7 days, how would you rate your pain on average, where 0 means no pain, and 10 means worst imaginable pain? 2 3  3 3   In general, would you say your health is: 3 2 3 2 3   In general, would you say your quality of life is: 3 3 3 3 3   In general, how would you rate your physical health? 2 2 3 3 3   In general, how would you rate your mental health, including your mood and your ability to think? 2 2 1 1 3   In general, how would you rate your satisfaction with your social activities and relationships? 2 1 2 1 2   In general, please rate how well you carry out your usual social activities and roles. (This  includes activities at home, at work and in your community, and responsibilities as a parent, child, spouse, employee, friend, etc.) 1 3 2 1 3   To what extent are you able to carry out your everyday physical activities such as walking, climbing stairs, carrying groceries, or moving a chair? 5 5 4 4 5   In the past 7 days, how often have you been bothered by emotional problems such as feeling anxious, depressed, or irritable? 4 3 5 4 3   In the past 7 days, how would you rate your fatigue on average? 3 3 5 4 2   In the past 7 days, how would you rate your pain on average, where 0 means no pain, and 10 means worst imaginable pain? 2 3 3 3 3   Global Mental Health Score 9 9 7 7 11   Global Physical Health Score 14 14 12 13 16   PROMIS TOTAL - SUBSCORES 23 23 19 20 27          ASSESSMENT: Current Emotional / Mental Status (status of significant symptoms):   Risk status (Self / Other harm or suicidal ideation)   Patient denies current fears or concerns for personal safety.   Patient denies current or recent suicidal ideation or behaviors.   Patient denies current or recent homicidal ideation or behaviors.   Patient denies current or recent self injurious behavior or ideation.   Patient denies other safety concerns.   Patient reports there has been no change in risk factors since their last session.     Patient reports there has been no change in protective factors since their last session.     A safety and risk management plan has been developed including: Patient consented to co-developed safety plan on 6/20/23.  Safety and risk management plan was reviewed.   Patient agreed to use safety plan should any safety concerns arise.  A copy was made available to the patient.     Appearance:   Appropriate    Eye Contact:   Good    Psychomotor Behavior: Normal    Attitude:   Cooperative    Orientation:   All   Speech    Rate / Production: Normal     Volume:  Normal    Mood:    Anxious    Affect:    Appropriate    Thought  Content:  Clear    Thought Form:  Coherent  Logical    Insight:    Good      Medication Review:   No changes to current psychiatric medication(s)     Medication Compliance:   Yes     Changes in Health Issues:   None reported     Chemical Use Review:   Substance Use: Problem use continues with no change since last session, Stage of Change: Preparatory and Action  Reviewed information and resources for treatment and ongoing sobriety  Patient assessed present costs and future losses as a result of substance use  Reviewed concerns related to health related substance abuse risk  Provided encouragement towards sobriety  Provided support and affirmation for steps taken towards sobriety    .   Tobacco Use: No current tobacco use.         Diagnosis:  Major Depressive Disorder, Recurrent Episode, Moderate with anxious distress    Collateral Reports Completed:   Not Applicable    PLAN: (Patient Tasks / Therapist Tasks / Other)  Pt is to challenge maladaptive thinking patterns using evidence as discussed in session.   Pt is to recognize emotions under cravings for substances and bring observations to next appointment.   Pt is to put together a simple, rough draft of a marketing plan. Pt is to notice how anxiey levels are impacted before making the plan, during, and after the place is complete. Pt is to bring observations to next appointment.       Jeannette Gilliam, Psychotherapist Trainee, [facility]  9/16/24    There has been demonstrated improvement in functioning while patient has been engaged in psychotherapy/psychological service- if withdrawn the patient would deteriorate and/or relapse.     This note has been reviewed and I agree with the plan of care. This note is co-signed by Valentine King MA, [facility] Supervisor, on: 9/16/2024        ______________________________________________________________________    Individual Treatment Plan    Patient's Name: Godfrey Porras  YOB: 1990    Date of Creation:  6/13/23  Date Treatment Plan Last Reviewed/Revised: 7/24/24    DSM5 Diagnoses: 296.32 (F33.1) Major Depressive Disorder, Recurrent Episode, Moderate With anxious distress  Psychosocial / Contextual Factors: loss of important relationship, stress at work and stress within important roles, death of close friends  PROMIS (reviewed every 90 days): 27    Referral / Collaboration:  Referral to another professional/service is not indicated at this time..    Anticipated number of session for this episode of care: 9-12 sessions  Anticipation frequency of session: Weekly  Anticipated Duration of each session: 38-52 minutes  Treatment plan will be reviewed in 90 days or when goals have been changed.       MeasurableTreatment Goal(s) related to diagnosis / functional impairment(s)  Goal 1: Patient will decrease his depressive symptoms by 50%.    I will know I've met my goal when I can do things I enjoy doing again.      Objective #A    Patient will Decrease frequency and intensity of feeling down, depressed, hopeless.  Status: Continued - Date(s): 7/24/24    Intervention(s)  Therapist will teach emotion regulation skills and coping skills for his depressive symptoms. Pt will be assigned homework related to practicing skills outside of therapy.    Objective #B  Patient will Identify negative self-talk and behaviors: challenge core beliefs, myths, and actions.   Status: Continued - Date(s): 7/24/24    Intervention(s)  Therapist will teach about thinking patterns and core beliefs/automatic thoughts. Pt will be assigned homework related to recognizing emotions and thinking patterns within himself to discuss during session .    Objective #C  Patient will Decrease thoughts that you'd be better off dead or of suicide / self-harm.   Status: Completed - Date: 12/5/23    Intervention(s)  Therapist will teach emotion regulation skills, distress tolerance skills, and thought modification to replace his maladaptive automatic thoughts. Pt  will be assigned homework of thought logs and practicing skills outside of therapy .    Objective #D   Patient will use cognitive strategies identified in therapy to challenge anxious thoughts    Status: Continued - Date(s): 7/24/24    Intervention(s)  Therapist will assign homework related to thought challenging outside of the therapy room using emotion regulation skills learned .    Objective #E  Patient will identify 5 values that are important to him and the way he lives his life. Pt will identify ways he can live with those values in mind.    Status: Completed - Date: 12/5/23    Intervention(s)  Therapist will introduce ACT and value identification to pt. Pt will be assigned homework related to behavioral activation.    Objective #F  Patient will talk to at least two others about losses and coping.   Status: Continued - Date(s): 7/24/24    Intervention(s)  Therapist will assign homework related to pt reaching out and building stronger social support in his life. .    Objective #G  Pt will reduce marijuana use over the next 12 weeks from daily use to a few times per week at night. Pt will stop using marijuana use during the day specifically.   Status: New - Date(s): 7/24/24    Intervention(s)  Pt will identify motivations behind marijuana use and work on implementing harm reduction strategies for lowering marijuana use.    Objective #H  Patient will increase motivation to engage in activities previously enjoyed over the next 12 weeks.  Status: New - Date(s): 7/24/24    Intervention(s)  Therapist will assign homework related to pt engaging in activities he has enjoyed doing while tracking mood before, during and after activities.      Patient has reviewed and agreed to the above plan.      Jeannette Gilliam, Psychotherapist Trainee, [facility]    7/24/24  __________________________________________________________________________________      [facility] Jessica YOST  "Emmanuelleira     SAFETY PLAN:  Step 1: Warning signs / cues (Thoughts, images, mood, situation, behavior) that a crisis may be developing:  Thoughts: \"People would be better off without me\", \"I'm a burden\", \"I can't do this anymore\" and \"Nothing makes it better\"  Images: flashbacks  Thinking Processes: ruminations (can't stop thinking about my problems): financial concerns and job concerns, racing thoughts, intrusive thoughts (bothersome, unwanted thoughts that come out of nowhere): thoughts of self harm and highly critical and negative thoughts: self judgemental thoughts and self distain  Mood: worsening depression, hopelessness, intense anger and disinhibited (not caring about things or consequences)  Behaviors: isolating/withdrawing , using drugs, using alcohol, aggression, not taking care of myself and not taking care of my responsibilities  Situations: relationship problems and financial stress   Step 2: Coping strategies - Things I can do to take my mind off of my problems without contacting another person (relaxation technique, physical activity):  Distress Tolerance Strategies:  relaxation activities: distraction activities in his home and sensory based activities/self-soothe with five senses: doing something that will taste sour and ground self using 5-4-3-2-1  Physical Activities: go for a walk and deep breathing  Focus on helpful thoughts:  \"This is temporary\", \"I will get through this\", \"Ride the wave\" and remind myself of what is important to me: family, job  Step 3: People and social settings that provide distraction:   Name: best friend Phone: pt has phone number in his phone  movie theater, coffee shop, park, library and gym    Step 4: Remind myself of people and things that are important to me and worth living for:  Family and my best friends.    Step 5: When I am in crisis, I can ask these people to help me use my safety plan:   Name: father Phone: pt has phone number in phone  Step 6: Making the " environment safe:   remove drugs, remove access to firearms: call father to take gun out of house and be around others  Step 7: Professionals or agencies I can contact during a crisis:  Suicide Prevention Lifeline: Call or Text 988   Local Crisis Services: 911, hospital    Call 911 or go to my nearest emergency department.   I helped develop this safety plan and agree to use it when needed.  I have been given a copy of this plan.      Client signature ________Godfrey Emmanuelleshanell___________  Today s date:  6/20/2023  Completed by Provider Name/ Credentials:  Jeannette Gilliam, Psychotherapist Trainee, [facility]  June 20, 2023  Adapted from Safety Plan Template 2008 Candy Borges and Mart Yoder is reprinted with the express permission of the authors.  No portion of the Safety Plan Template may be reproduced without the express, written permission.  You can contact the authors at [email or web address] or [email or web address].

## 2024-10-14 ENCOUNTER — VIRTUAL VISIT (OUTPATIENT)
Dept: PSYCHOLOGY | Facility: CLINIC | Age: 34
End: 2024-10-14
Payer: COMMERCIAL

## 2024-10-14 DIAGNOSIS — F33.1 MAJOR DEPRESSIVE DISORDER, RECURRENT EPISODE, MODERATE WITH ANXIOUS DISTRESS (H): Primary | ICD-10-CM

## 2024-10-14 PROCEDURE — 90837 PSYTX W PT 60 MINUTES: CPT | Mod: 95

## 2024-10-14 ASSESSMENT — ANXIETY QUESTIONNAIRES
6. BECOMING EASILY ANNOYED OR IRRITABLE: MORE THAN HALF THE DAYS
1. FEELING NERVOUS, ANXIOUS, OR ON EDGE: MORE THAN HALF THE DAYS
3. WORRYING TOO MUCH ABOUT DIFFERENT THINGS: SEVERAL DAYS
IF YOU CHECKED OFF ANY PROBLEMS ON THIS QUESTIONNAIRE, HOW DIFFICULT HAVE THESE PROBLEMS MADE IT FOR YOU TO DO YOUR WORK, TAKE CARE OF THINGS AT HOME, OR GET ALONG WITH OTHER PEOPLE: SOMEWHAT DIFFICULT
GAD7 TOTAL SCORE: 9
4. TROUBLE RELAXING: MORE THAN HALF THE DAYS
7. FEELING AFRAID AS IF SOMETHING AWFUL MIGHT HAPPEN: SEVERAL DAYS
5. BEING SO RESTLESS THAT IT IS HARD TO SIT STILL: NOT AT ALL
7. FEELING AFRAID AS IF SOMETHING AWFUL MIGHT HAPPEN: SEVERAL DAYS
GAD7 TOTAL SCORE: 9
8. IF YOU CHECKED OFF ANY PROBLEMS, HOW DIFFICULT HAVE THESE MADE IT FOR YOU TO DO YOUR WORK, TAKE CARE OF THINGS AT HOME, OR GET ALONG WITH OTHER PEOPLE?: SOMEWHAT DIFFICULT
GAD7 TOTAL SCORE: 9
2. NOT BEING ABLE TO STOP OR CONTROL WORRYING: SEVERAL DAYS

## 2024-10-14 NOTE — PROGRESS NOTES
[facility]                                     Progress Note    Patient Name: Godfrey Porras  Date: 10/14/24           Service Type: Individual      Session Start Time: 8:06am Session End Time: 8:59am     Session Length: 53 minutes    Session #: 22    Attendees: Client attended alone    Service Modality:  Video Visit:      Provider verified identity through the following two step process.  Patient provided:  Patient is known previously to provider     Telemedicine Visit: The patient's condition can be safely assessed and treated via synchronous audio and visual telemedicine encounter.       Reason for Telemedicine Visit: Patient has requested telehealth visit and Patient convenience (e.g. access to timely appointments / distance to available provider)     Originating Site (Patient Location): Patient's home     Distant Site (Provider Location): Secure location at remote home office     Consent:  The patient/guardian has verbally consented to: the potential risks and benefits of telemedicine (video visit) versus in person care; bill my insurance or make self-payment for services provided; and responsibility for payment of non-covered services.      Patient would like the video invitation sent by:  My Chart     Mode of Communication:  Video Conference via [employer] include 234 beds at the [facility]     Distant Location (Provider):  On-site     As the provider I attest to compliance with applicable laws and regulations related to telemedicine.    DATA  Extended Session (53+ minutes): PROLONGED SERVICE IN THE OUTPATIENT SETTING REQUIRING DIRECT (FACE-TO-FACE) PATIENT CONTACT BEYOND THE USUAL SERVICE:     - Patient's presenting concerns require more intensive intervention than could be completed within the usual service   Interactive Complexity: No  Crisis: No        Progress Since Last Session (Related to Symptoms / Goals / Homework):   Symptoms: Improving overall mental health symptoms    Homework: Achieved / completed to  "satisfaction      Episode of Care Goals: Satisfactory progress - ACTION (Actively working towards change); Intervened by reinforcing change plan / affirming steps taken     Current / Ongoing Stressors and Concerns:   Pt reports ongoing stressors as navigating various relationships and roles in different areas of life. Pt reports mood since last session has included feeling \"excited and anxious\". Pt shared events that took place since last session and processed emotions. Pt and provider discussed starting a new job, relationship patterns, self judgmental thinking, challenging maladaptive thinking patterns, behavioral activation, and identifying how early childhood relationship norms continue to be present in various relationships. Pt appears to have gained insight into his need for order and reducing self judgment. Pt appears to be working toward behavioral activation. Pt denies any SI, SIB, or HI since last session. See below for plan of care until next appointment.     Treatment Objective(s) Addressed in This Session:   use cognitive strategies identified in therapy to challenge anxious thoughts  Increase interest, engagement, and pleasure in doing things  Identify negative self-talk and behaviors: challenge core beliefs, myths, and actions  Improve concentration, focus, and mindfulness in daily activities   Feel less fidgety, restless or slow in daily activities / interpersonal interactions  learn & utilize at least 2 assertive communication skills weekly  Emotions under cravings for substances     Intervention:   CBT: challenging maladaptive thinking patterns, behavioral activation, scheduling self to do things he enjoys doing  DBT: distress tolerance and emotion regulation skills discussion  Motivational Interviewing: increasing internal motivation for change, evoking change talk, OARS, reflecting and reframing, brief action planning  Psychodynamic: identifying how childhood relationship norms are still present " in various relationships/the way he approaches relationships in life  ACT: creating change based in values, discussion of how values can influence sustainable change    Assessments completed prior to visit:  The following assessments were completed by patient for this visit:  PROMIS 10-Global Health (all questions and answers displayed):       3/29/2022     9:02 AM 8/8/2022     9:50 AM 5/31/2023    10:16 AM 6/7/2023     1:18 PM 12/19/2023     2:03 PM 10/14/2024     8:06 AM   PROMIS 10   In general, would you say your health is: Good Fair  Fair Good Good   In general, would you say your quality of life is: Good Good  Good Good Good   In general, how would you rate your physical health? Fair Fair  Good Good Fair   In general, how would you rate your mental health, including your mood and your ability to think? Fair Fair  Poor Good Good   In general, how would you rate your satisfaction with your social activities and relationships? Fair Poor  Poor Fair Good   In general, please rate how well you carry out your usual social activities and roles Poor Good  Poor Good Good   To what extent are you able to carry out your everyday physical activities such as walking, climbing stairs, carrying groceries, or moving a chair? Completely Completely  Mostly Completely Mostly   In the past 7 days, how often have you been bothered by emotional problems such as feeling anxious, depressed, or irritable? Often Sometimes  Often Sometimes Sometimes   In the past 7 days, how would you rate your fatigue on average? Moderate Moderate  Severe Mild Moderate   In the past 7 days, how would you rate your pain on average, where 0 means no pain, and 10 means worst imaginable pain? 2 3  3 3 3   In general, would you say your health is: 3 2 3 2 3 3   In general, would you say your quality of life is: 3 3 3 3 3 3   In general, how would you rate your physical health? 2 2 3 3 3 2   In general, how would you rate your mental health, including your  mood and your ability to think? 2 2 1 1 3 3   In general, how would you rate your satisfaction with your social activities and relationships? 2 1 2 1 2 3   In general, please rate how well you carry out your usual social activities and roles. (This includes activities at home, at work and in your community, and responsibilities as a parent, child, spouse, employee, friend, etc.) 1 3 2 1 3 3   To what extent are you able to carry out your everyday physical activities such as walking, climbing stairs, carrying groceries, or moving a chair? 5 5 4 4 5 4   In the past 7 days, how often have you been bothered by emotional problems such as feeling anxious, depressed, or irritable? 4 3 5 4 3 3   In the past 7 days, how would you rate your fatigue on average? 3 3 5 4 2 3   In the past 7 days, how would you rate your pain on average, where 0 means no pain, and 10 means worst imaginable pain? 2 3 3 3 3 3   Global Mental Health Score 9 9 7 7 11 12   Global Physical Health Score 14 14 12 13 16 13   PROMIS TOTAL - SUBSCORES 23 23 19 20 27 25          ASSESSMENT: Current Emotional / Mental Status (status of significant symptoms):   Risk status (Self / Other harm or suicidal ideation)   Patient denies current fears or concerns for personal safety.   Patient denies current or recent suicidal ideation or behaviors.   Patient denies current or recent homicidal ideation or behaviors.   Patient denies current or recent self injurious behavior or ideation.   Patient denies other safety concerns.   Patient reports there has been no change in risk factors since their last session.     Patient reports there has been no change in protective factors since their last session.     A safety and risk management plan has been developed including: Patient consented to co-developed safety plan on 6/20/23.  Safety and risk management plan was reviewed.   Patient agreed to use safety plan should any safety concerns arise.  A copy was made available to  the patient.     Appearance:   Appropriate    Eye Contact:   Good    Psychomotor Behavior: Normal    Attitude:   Cooperative    Orientation:   All   Speech    Rate / Production: Normal     Volume:  Normal    Mood:    Anxious    Affect:    Appropriate  Bright    Thought Content:  Clear    Thought Form:  Coherent  Logical    Insight:    Good      Medication Review:   No changes to current psychiatric medication(s)     Medication Compliance:   Yes     Changes in Health Issues:   None reported     Chemical Use Review:   Substance Use: Problem use continues with no change since last session, Stage of Change: Preparatory  Reviewed information and resources for treatment and ongoing sobriety  Patient assessed present costs and future losses as a result of substance use  Reviewed concerns related to health related substance abuse risk  Provided encouragement towards sobriety  Provided support and affirmation for steps taken towards sobriety    .   Tobacco Use: No current tobacco use.         Diagnosis:  Major Depressive Disorder, Recurrent Episode, Moderate with anxious distress    Collateral Reports Completed:   Not Applicable    PLAN: (Patient Tasks / Therapist Tasks / Other)  Pt is to prep for his new job by engaging in structure scheduling in the morning and cutting back on substance use as discussed in session.   Pt is to notice emotions present over the next few weeks and bring thoughts to next appointment for further discussion.        Jeannette Gilliam, Psychotherapist Trainee, [facility]  10/14/24    There has been demonstrated improvement in functioning while patient has been engaged in psychotherapy/psychological service- if withdrawn the patient would deteriorate and/or relapse.     This note has been reviewed and I agree with the plan of care. This note is co-signed by Valentine King MA, [facility] Supervisor, on: 10/14/2024      ______________________________________________________________________    Individual  Treatment Plan    Patient's Name: Godfrey Porras  YOB: 1990    Date of Creation: 6/13/23  Date Treatment Plan Last Reviewed/Revised: 7/24/24    DSM5 Diagnoses: 296.32 (F33.1) Major Depressive Disorder, Recurrent Episode, Moderate With anxious distress  Psychosocial / Contextual Factors: loss of important relationship, stress at work and stress within important roles, death of close friends  PROMIS (reviewed every 90 days): 27    Referral / Collaboration:  Referral to another professional/service is not indicated at this time..    Anticipated number of session for this episode of care: 9-12 sessions  Anticipation frequency of session: Weekly  Anticipated Duration of each session: 38-52 minutes  Treatment plan will be reviewed in 90 days or when goals have been changed.       MeasurableTreatment Goal(s) related to diagnosis / functional impairment(s)  Goal 1: Patient will decrease his depressive symptoms by 50%.    I will know I've met my goal when I can do things I enjoy doing again.      Objective #A    Patient will Decrease frequency and intensity of feeling down, depressed, hopeless.  Status: Continued - Date(s): 7/24/24    Intervention(s)  Therapist will teach emotion regulation skills and coping skills for his depressive symptoms. Pt will be assigned homework related to practicing skills outside of therapy.    Objective #B  Patient will Identify negative self-talk and behaviors: challenge core beliefs, myths, and actions.   Status: Continued - Date(s): 7/24/24    Intervention(s)  Therapist will teach about thinking patterns and core beliefs/automatic thoughts. Pt will be assigned homework related to recognizing emotions and thinking patterns within himself to discuss during session .    Objective #C  Patient will Decrease thoughts that you'd be better off dead or of suicide / self-harm.   Status: Completed - Date: 12/5/23    Intervention(s)  Therapist will teach emotion regulation skills,  distress tolerance skills, and thought modification to replace his maladaptive automatic thoughts. Pt will be assigned homework of thought logs and practicing skills outside of therapy .    Objective #D   Patient will use cognitive strategies identified in therapy to challenge anxious thoughts    Status: Continued - Date(s): 7/24/24    Intervention(s)  Therapist will assign homework related to thought challenging outside of the therapy room using emotion regulation skills learned .    Objective #E  Patient will identify 5 values that are important to him and the way he lives his life. Pt will identify ways he can live with those values in mind.    Status: Completed - Date: 12/5/23    Intervention(s)  Therapist will introduce ACT and value identification to pt. Pt will be assigned homework related to behavioral activation.    Objective #F  Patient will talk to at least two others about losses and coping.   Status: Continued - Date(s): 7/24/24    Intervention(s)  Therapist will assign homework related to pt reaching out and building stronger social support in his life. .    Objective #G  Pt will reduce marijuana use over the next 12 weeks from daily use to a few times per week at night. Pt will stop using marijuana use during the day specifically.   Status: New - Date(s): 7/24/24    Intervention(s)  Pt will identify motivations behind marijuana use and work on implementing harm reduction strategies for lowering marijuana use.    Objective #H  Patient will increase motivation to engage in activities previously enjoyed over the next 12 weeks.  Status: New - Date(s): 7/24/24    Intervention(s)  Therapist will assign homework related to pt engaging in activities he has enjoyed doing while tracking mood before, during and after activities.      Patient has reviewed and agreed to the above plan.      Jeannette Gilliam, Psychotherapist Trainee,  "[facility]    7/24/24  __________________________________________________________________________________      [facility]                                       Godfrey Porras     SAFETY PLAN:  Step 1: Warning signs / cues (Thoughts, images, mood, situation, behavior) that a crisis may be developing:  Thoughts: \"People would be better off without me\", \"I'm a burden\", \"I can't do this anymore\" and \"Nothing makes it better\"  Images: flashbacks  Thinking Processes: ruminations (can't stop thinking about my problems): financial concerns and job concerns, racing thoughts, intrusive thoughts (bothersome, unwanted thoughts that come out of nowhere): thoughts of self harm and highly critical and negative thoughts: self judgemental thoughts and self distain  Mood: worsening depression, hopelessness, intense anger and disinhibited (not caring about things or consequences)  Behaviors: isolating/withdrawing , using drugs, using alcohol, aggression, not taking care of myself and not taking care of my responsibilities  Situations: relationship problems and financial stress   Step 2: Coping strategies - Things I can do to take my mind off of my problems without contacting another person (relaxation technique, physical activity):  Distress Tolerance Strategies:  relaxation activities: distraction activities in his home and sensory based activities/self-soothe with five senses: doing something that will taste sour and ground self using 5-4-3-2-1  Physical Activities: go for a walk and deep breathing  Focus on helpful thoughts:  \"This is temporary\", \"I will get through this\", \"Ride the wave\" and remind myself of what is important to me: family, job  Step 3: People and social settings that provide distraction:   Name: best friend Phone: pt has phone number in his phone  movie theater, coffee shop, park, library and gym    Step 4: Remind myself of people and things that are important to me and worth living for:  Family " and my best friends.    Step 5: When I am in crisis, I can ask these people to help me use my safety plan:   Name: father Phone: pt has phone number in phone  Step 6: Making the environment safe:   remove drugs, remove access to firearms: call father to take gun out of house and be around others  Step 7: Professionals or agencies I can contact during a crisis:  Suicide Prevention Lifeline: Call or Text 988   Local Crisis Services: 911, hospital    Call 911 or go to my nearest emergency department.   I helped develop this safety plan and agree to use it when needed.  I have been given a copy of this plan.      Client signature ________Jacob Dasilveira___________  Today s date:  6/20/2023  Completed by Provider Name/ Credentials:  Jeannette Gilliam, Psychotherapist Trainee, [facility]  June 20, 2023  Adapted from Safety Plan Template 2008 Candy Borges and Mart Yoder is reprinted with the express permission of the authors.  No portion of the Safety Plan Template may be reproduced without the express, written permission.  You can contact the authors at [email or web address] or [email or web address].

## 2024-11-15 ENCOUNTER — VIRTUAL VISIT (OUTPATIENT)
Dept: PSYCHOLOGY | Facility: CLINIC | Age: 34
End: 2024-11-15
Payer: COMMERCIAL

## 2024-11-15 DIAGNOSIS — F33.1 MAJOR DEPRESSIVE DISORDER, RECURRENT EPISODE, MODERATE WITH ANXIOUS DISTRESS (H): Primary | ICD-10-CM

## 2024-11-15 PROCEDURE — 90834 PSYTX W PT 45 MINUTES: CPT | Mod: 95

## 2024-11-15 ASSESSMENT — PATIENT HEALTH QUESTIONNAIRE - PHQ9
10. IF YOU CHECKED OFF ANY PROBLEMS, HOW DIFFICULT HAVE THESE PROBLEMS MADE IT FOR YOU TO DO YOUR WORK, TAKE CARE OF THINGS AT HOME, OR GET ALONG WITH OTHER PEOPLE: NOT DIFFICULT AT ALL
SUM OF ALL RESPONSES TO PHQ QUESTIONS 1-9: 9
SUM OF ALL RESPONSES TO PHQ QUESTIONS 1-9: 9

## 2024-11-15 ASSESSMENT — ANXIETY QUESTIONNAIRES
GAD7 TOTAL SCORE: 7
7. FEELING AFRAID AS IF SOMETHING AWFUL MIGHT HAPPEN: SEVERAL DAYS
5. BEING SO RESTLESS THAT IT IS HARD TO SIT STILL: SEVERAL DAYS
7. FEELING AFRAID AS IF SOMETHING AWFUL MIGHT HAPPEN: SEVERAL DAYS
1. FEELING NERVOUS, ANXIOUS, OR ON EDGE: SEVERAL DAYS
6. BECOMING EASILY ANNOYED OR IRRITABLE: MORE THAN HALF THE DAYS
GAD7 TOTAL SCORE: 7
2. NOT BEING ABLE TO STOP OR CONTROL WORRYING: NOT AT ALL
4. TROUBLE RELAXING: SEVERAL DAYS
GAD7 TOTAL SCORE: 7
8. IF YOU CHECKED OFF ANY PROBLEMS, HOW DIFFICULT HAVE THESE MADE IT FOR YOU TO DO YOUR WORK, TAKE CARE OF THINGS AT HOME, OR GET ALONG WITH OTHER PEOPLE?: SOMEWHAT DIFFICULT
3. WORRYING TOO MUCH ABOUT DIFFERENT THINGS: SEVERAL DAYS
IF YOU CHECKED OFF ANY PROBLEMS ON THIS QUESTIONNAIRE, HOW DIFFICULT HAVE THESE PROBLEMS MADE IT FOR YOU TO DO YOUR WORK, TAKE CARE OF THINGS AT HOME, OR GET ALONG WITH OTHER PEOPLE: SOMEWHAT DIFFICULT

## 2024-11-15 NOTE — PROGRESS NOTES
[facility]                                     Progress Note    Patient Name: Godfrey Porras  Date: 11/15/24           Service Type: Individual      Session Start Time: 1:40pm Session End Time: 2:25pm     Session Length: 45 minutes    Session #: 23    Attendees: Client attended alone    Service Modality:  Video Visit:      Provider verified identity through the following two step process.  Patient provided:  Patient is known previously to provider     Telemedicine Visit: The patient's condition can be safely assessed and treated via synchronous audio and visual telemedicine encounter.       Reason for Telemedicine Visit: Patient has requested telehealth visit and Patient convenience (e.g. access to timely appointments / distance to available provider)     Originating Site (Patient Location): Patient's home     Distant Site (Provider Location): ON SITE - [facility]     Consent:  The patient/guardian has verbally consented to: the potential risks and benefits of telemedicine (video visit) versus in person care; bill my insurance or make self-payment for services provided; and responsibility for payment of non-covered services.      Patient would like the video invitation sent by:  My Chart     Mode of Communication:  Video Conference via [facility]well     Distant Location (Provider):  On-site     As the provider I attest to compliance with applicable laws and regulations related to telemedicine.    DATA  Extended Session (53+ minutes): No  Interactive Complexity: No  Crisis: No        Progress Since Last Session (Related to Symptoms / Goals / Homework):   Symptoms: Improving symptoms since last appointment    Homework: Achieved / completed to satisfaction      Episode of Care Goals: Satisfactory progress - ACTION (Actively working towards change); Intervened by reinforcing change plan / affirming steps taken     Current / Ongoing Stressors and Concerns:    Pt reports  "ongoing stressors as navigating various relationships and roles in different areas of life. Pt reports mood since last session has included feelings of \"curiosity\" and \"anxiety\". Pt shared events that took place since last session and processed emotions. Pt and provider discussed starting his new job, goals he is working toward, grounding and brain chemistry, and moving forward. Pt appears to have gained insight into how thinking patterns affect behavior as well as importance of motivation for task completion. Pt appears to be working toward being present as well as growing internal motivation. Pt denies any SI, SIB, or HI since last session. See below for plan of care until next appointment       Treatment Objective(s) Addressed in This Session:   use cognitive strategies identified in therapy to challenge anxious thoughts  Increase interest, engagement, and pleasure in doing things  Identify negative self-talk and behaviors: challenge core beliefs, myths, and actions  Improve concentration, focus, and mindfulness in daily activities   Feel less fidgety, restless or slow in daily activities / interpersonal interactions  learn & utilize at least 2 assertive communication skills weekly  Growing internal motivation     Intervention:   CBT: challenging maladaptive thinking patterns, behavioral activation, scheduling self to do things he enjoys doing  DBT: distress tolerance and emotion regulation skills discussion  Motivational Interviewing: increasing internal motivation for change, evoking change talk, OARS, reflecting and reframing, brief action planning  Psychodynamic: identifying how childhood relationship norms are still present in various relationships/the way he approaches relationships in life    Assessments completed prior to visit:  The following assessments were completed by patient for this visit:  PROMIS 10-Global Health (all questions and answers displayed):       3/29/2022     9:02 AM 8/8/2022     9:50 " AM 5/31/2023    10:16 AM 6/7/2023     1:18 PM 12/19/2023     2:03 PM 10/14/2024     8:06 AM   PROMIS 10   In general, would you say your health is: Good Fair  Fair Good Good   In general, would you say your quality of life is: Good Good  Good Good Good   In general, how would you rate your physical health? Fair Fair  Good Good Fair   In general, how would you rate your mental health, including your mood and your ability to think? Fair Fair  Poor Good Good   In general, how would you rate your satisfaction with your social activities and relationships? Fair Poor  Poor Fair Good   In general, please rate how well you carry out your usual social activities and roles Poor Good  Poor Good Good   To what extent are you able to carry out your everyday physical activities such as walking, climbing stairs, carrying groceries, or moving a chair? Completely Completely  Mostly Completely Mostly   In the past 7 days, how often have you been bothered by emotional problems such as feeling anxious, depressed, or irritable? Often Sometimes  Often Sometimes Sometimes   In the past 7 days, how would you rate your fatigue on average? Moderate Moderate  Severe Mild Moderate   In the past 7 days, how would you rate your pain on average, where 0 means no pain, and 10 means worst imaginable pain? 2 3  3 3 3   In general, would you say your health is: 3  2  3 2  3  3    In general, would you say your quality of life is: 3  3  3 3  3  3    In general, how would you rate your physical health? 2  2  3 3  3  2    In general, how would you rate your mental health, including your mood and your ability to think? 2  2  1 1  3  3    In general, how would you rate your satisfaction with your social activities and relationships? 2  1  2 1  2  3    In general, please rate how well you carry out your usual social activities and roles. (This includes activities at home, at work and in your community, and responsibilities as a parent, child, spouse,  employee, friend, etc.) 1  3  2 1  3  3    To what extent are you able to carry out your everyday physical activities such as walking, climbing stairs, carrying groceries, or moving a chair? 5  5  4 4  5  4    In the past 7 days, how often have you been bothered by emotional problems such as feeling anxious, depressed, or irritable? 4  3  5 4  3  3    In the past 7 days, how would you rate your fatigue on average? 3  3  5 4  2  3    In the past 7 days, how would you rate your pain on average, where 0 means no pain, and 10 means worst imaginable pain? 2  3  3 3  3  3    Global Mental Health Score 9 9 7 7 11 12   Global Physical Health Score 14 14 12 13 16 13   PROMIS TOTAL - SUBSCORES 23 23 19 20 27 25       Patient-reported          ASSESSMENT: Current Emotional / Mental Status (status of significant symptoms):   Risk status (Self / Other harm or suicidal ideation)   Patient denies current fears or concerns for personal safety.   Patient denies current or recent suicidal ideation or behaviors.   Patient denies current or recent homicidal ideation or behaviors.   Patient denies current or recent self injurious behavior or ideation.   Patient denies other safety concerns.   Patient reports there has been no change in risk factors since their last session.     Patient reports there has been no change in protective factors since their last session.     A safety and risk management plan has been developed including: Patient consented to co-developed safety plan on 6/20/23.  Safety and risk management plan was reviewed.   Patient agreed to use safety plan should any safety concerns arise.  A copy was made available to the patient.     Appearance:   Appropriate    Eye Contact:   Good    Psychomotor Behavior: Normal    Attitude:   Cooperative    Orientation:   All   Speech    Rate / Production: Normal     Volume:  Normal    Mood:    Anxious    Affect:    Appropriate    Thought Content:  Clear    Thought Form:  Coherent   Logical    Insight:    Good      Medication Review:   No changes to current psychiatric medication(s)     Medication Compliance:   Yes     Changes in Health Issues:   None reported     Chemical Use Review:   Substance Use: Problem use continues with no change since last session, Stage of Change: Preparatory  Reviewed information and resources for treatment and ongoing sobriety  Patient assessed present costs and future losses as a result of substance use  Reviewed concerns related to health related substance abuse risk  Provided encouragement towards sobriety  Provided support and affirmation for steps taken towards sobriety    .   Tobacco Use: No current tobacco use.         Diagnosis:  Major Depressive Disorder, Recurrent Episode, Moderate with anxious distress    Collateral Reports Completed:   Not Applicable    PLAN: (Patient Tasks / Therapist Tasks / Other)  Pt is to work on establishing open communication and communication norms in various relationships.   Pt is to identify barriers to motivation related to engaging in physical activity.        Jeannette Gilliam, Psychotherapist Trainee, [facility]  11/15/24    There has been demonstrated improvement in functioning while patient has been engaged in psychotherapy/psychological service- if withdrawn the patient would deteriorate and/or relapse.       ______________________________________________________________________    Individual Treatment Plan    Patient's Name: Godfrey Porras  YOB: 1990    Date of Creation: 6/13/23  Date Treatment Plan Last Reviewed/Revised: 11/15/24    DSM5 Diagnoses: 296.32 (F33.1) Major Depressive Disorder, Recurrent Episode, Moderate With anxious distress  Psychosocial / Contextual Factors: loss of important relationship, stress at work and stress within important roles, death of close friends  PROMIS (reviewed every 90 days): 25    Referral / Collaboration:  Referral to another professional/service is not indicated at this  time..    Anticipated number of session for this episode of care: 9-12 sessions  Anticipation frequency of session: Weekly  Anticipated Duration of each session: 38-52 minutes  Treatment plan will be reviewed in 90 days or when goals have been changed.       MeasurableTreatment Goal(s) related to diagnosis / functional impairment(s)  Goal 1: Patient will decrease his depressive symptoms by 50%.    I will know I've met my goal when I can do things I enjoy doing again.      Objective #A    Patient will Decrease frequency and intensity of feeling down, depressed, hopeless.  Status: Continued - Date(s): 11/15/24    Intervention(s)  Therapist will teach emotion regulation skills and coping skills for his depressive symptoms. Pt will be assigned homework related to practicing skills outside of therapy.    Objective #B  Patient will Identify negative self-talk and behaviors: challenge core beliefs, myths, and actions.   Status: Continued - Date(s): 11/15/24    Intervention(s)  Therapist will teach about thinking patterns and core beliefs/automatic thoughts. Pt will be assigned homework related to recognizing emotions and thinking patterns within himself to discuss during session .    Objective #C  Patient will Decrease thoughts that you'd be better off dead or of suicide / self-harm.   Status: Completed - Date: 12/5/23    Intervention(s)  Therapist will teach emotion regulation skills, distress tolerance skills, and thought modification to replace his maladaptive automatic thoughts. Pt will be assigned homework of thought logs and practicing skills outside of therapy .    Objective #D   Patient will use cognitive strategies identified in therapy to challenge anxious thoughts    Status: Continued - Date(s): 11/15/24    Intervention(s)  Therapist will assign homework related to thought challenging outside of the therapy room using emotion regulation skills learned .    Objective #E  Patient will identify 5 values that are  "important to him and the way he lives his life. Pt will identify ways he can live with those values in mind.    Status: Completed - Date: 12/5/23    Intervention(s)  Therapist will introduce ACT and value identification to pt. Pt will be assigned homework related to behavioral activation.    Objective #F  Patient will talk to at least two others about losses and coping.   Status: Continued - Date(s): 11/15/24    Intervention(s)  Therapist will assign homework related to pt reaching out and building stronger social support in his life. .    Objective #G  Pt will reduce marijuana use over the next 12 weeks from daily use to a few times per week at night. Pt will stop using marijuana use during the day specifically.   Status: Continued - Date(s): 11/15/24    Intervention(s)  Pt will identify motivations behind marijuana use and work on implementing harm reduction strategies for lowering marijuana use.    Objective #H  Patient will increase motivation to engage in activities previously enjoyed over the next 12 weeks.  Status: Continued - Date(s): 11/15/24    Intervention(s)  Therapist will assign homework related to pt engaging in activities he has enjoyed doing while tracking mood before, during and after activities.      Patient has reviewed and agreed to the above plan.      Jeannette Gilliam, Psychotherapist Trainee, [facility]    11/15/24  __________________________________________________________________________________      [facility]                                       Godfrey Porras     SAFETY PLAN:  Step 1: Warning signs / cues (Thoughts, images, mood, situation, behavior) that a crisis may be developing:  Thoughts: \"People would be better off without me\", \"I'm a burden\", \"I can't do this anymore\" and \"Nothing makes it better\"  Images: flashbacks  Thinking Processes: ruminations (can't stop thinking about my problems): financial concerns and job concerns, racing thoughts, intrusive thoughts " "(bothersome, unwanted thoughts that come out of nowhere): thoughts of self harm and highly critical and negative thoughts: self judgemental thoughts and self distain  Mood: worsening depression, hopelessness, intense anger and disinhibited (not caring about things or consequences)  Behaviors: isolating/withdrawing , using drugs, using alcohol, aggression, not taking care of myself and not taking care of my responsibilities  Situations: relationship problems and financial stress   Step 2: Coping strategies - Things I can do to take my mind off of my problems without contacting another person (relaxation technique, physical activity):  Distress Tolerance Strategies:  relaxation activities: distraction activities in his home and sensory based activities/self-soothe with five senses: doing something that will taste sour and ground self using 5-4-3-2-1  Physical Activities: go for a walk and deep breathing  Focus on helpful thoughts:  \"This is temporary\", \"I will get through this\", \"Ride the wave\" and remind myself of what is important to me: family, job  Step 3: People and social settings that provide distraction:   Name: best friend Phone: pt has phone number in his phone  movie theater, coffee shop, park, library and gym    Step 4: Remind myself of people and things that are important to me and worth living for:  Family and my best friends.    Step 5: When I am in crisis, I can ask these people to help me use my safety plan:   Name: father Phone: pt has phone number in phone  Step 6: Making the environment safe:   remove drugs, remove access to firearms: call father to take gun out of house and be around others  Step 7: Professionals or agencies I can contact during a crisis:  Suicide Prevention Lifeline: Call or Text 988   Local Crisis Services: 911, hospital    Call 911 or go to my nearest emergency department.   I helped develop this safety plan and agree to use it when needed.  I have been given a copy of this " plan.      Client signature ________Godfrey Porras___________  Today s date:  6/20/2023  Completed by Provider Name/ Credentials:  Jeannette Gilliam, Psychotherapist Trainee, [facility]  June 20, 2023  Adapted from Safety Plan Template 2008 Candy Borges and Mart Yoder is reprinted with the express permission of the authors.  No portion of the Safety Plan Template may be reproduced without the express, written permission.  You can contact the authors at [email or web address] or [email or web address].

## 2024-12-02 DIAGNOSIS — J45.40 MODERATE PERSISTENT ASTHMA WITHOUT COMPLICATION: ICD-10-CM

## 2024-12-02 DIAGNOSIS — F90.2 ATTENTION DEFICIT HYPERACTIVITY DISORDER (ADHD), COMBINED TYPE: ICD-10-CM

## 2024-12-02 RX ORDER — LISDEXAMFETAMINE DIMESYLATE 20 MG/1
20 CAPSULE ORAL EVERY MORNING
Qty: 30 CAPSULE | Refills: 0 | Status: SHIPPED | OUTPATIENT
Start: 2024-12-02

## 2024-12-02 RX ORDER — ALBUTEROL SULFATE 90 UG/1
2 INHALANT RESPIRATORY (INHALATION) EVERY 4 HOURS PRN
Qty: 18 G | Refills: 5 | Status: SHIPPED | OUTPATIENT
Start: 2024-12-02

## 2024-12-02 NOTE — TELEPHONE ENCOUNTER
Medication Question or Refill        lisdexamfetamine (VYVANSE) 20 MG capsule       albuterol (PROAIR HFA/PROVENTIL HFA/VENTOLIN HFA) 108 (90 Base) MCG/ACT inhaler    (Ventolin)    What medication are you calling about (include dose and sig)?:     Pt requesting different inhaler so ins. Will pay for it?  Ventolin? -     Preferred Pharmacy:   [place], [place] - [address]       [facility] #[id number] - [place], [place] - [address] [facility]  [address] [place] [address]  Phone: [phone] Fax: [phone]      Controlled Substance Agreement on file:   CSA -- Patient Level:    CSA: None found at the patient level.       Who prescribed the medication?: Shea A    Do you need a refill? Yes    When did you use the medication last? NA    Patient offered an appointment? No    Do you have any questions or concerns?  No      Could we send this information to you in [facility] or would you prefer to receive a phone call?:   WILLIAMS MARLOW

## 2025-01-05 ENCOUNTER — TELEPHONE (OUTPATIENT)
Dept: FAMILY MEDICINE | Facility: CLINIC | Age: 35
End: 2025-01-05
Payer: COMMERCIAL

## 2025-01-05 NOTE — TELEPHONE ENCOUNTER
Medication Question or Refill    Contacts       Contact Date/Time Type Contact Phone/Fax    01/05/2025 10:44 AM CST Phone (Incoming) Godfrey Porras (Self) [phone] (H)            What medication are you calling about (include dose and sig)?:  Lisdex anphetomine    Preferred Pharmacy:   [facility] [place], [place] - [address]  [address]  [facility] [address]  Phone: [phone] Fax: [phone]    [id number] - [place], [place] - [address] AT [facility] & [address]  [address]  [facility] [address]  Phone: [phone] Fax: [phone]    [id number] - [place], [place] - [address] AT [facility] & [address]  [address]  [place] [address]  Phone: [phone] Fax: [phone]    [id number] - [place] - [address] [facility] AT [address]  [address] [place] [address]  Phone: [phone] Fax: [phone]    [id number] - [place], [place] - [address] AT [facility] & [facility] [address]  [address]  [facility] [address]  Phone: [phone] Fax: [phone]    [id number] - [place], [place] - [address]  [address]  [facility] [address]  Phone: [phone] Fax: [phone]    [id number] - [place], [place] - [address]  [address]  [place] [address]  Phone: [phone] Fax: [phone]      Controlled Substance Agreement on file:   CSA -- Patient Level:    CSA: None found at the patient level.       Who prescribed the medication?: Dr. Bedoya    Do you need a refill? Yes    When did you use the medication last? 1/5/2024    Patient offered an appointment? Will be going out of the country and would like a refill    Do you have any questions or concerns?  No      Could we send this information to you in [place] or would you  prefer to receive a phone call?:   Patient would prefer a phone call   Okay to leave a detailed message?: Yes at Cell number on file:    Telephone Information:   Mobile [phone]   Mobile [phone]

## 2025-01-06 NOTE — TELEPHONE ENCOUNTER
Attempt #1    LM to identify which pharnacy they  at, when they are leaving out of the country and to confirm medication (RX) pt is looking for?

## 2025-01-07 NOTE — TELEPHONE ENCOUNTER
Attempt #2    LM to call to confirm which pharmacy and when he is leaving out of the country for the provider.

## 2025-01-09 NOTE — TELEPHONE ENCOUNTER
Attempt #3 - final    LM that this is the final message for pt to call back and confirm pharmacy and when he was leaving out of the country.

## 2025-03-13 DIAGNOSIS — J45.40 MODERATE PERSISTENT ASTHMA WITHOUT COMPLICATION: ICD-10-CM

## 2025-03-13 RX ORDER — ALBUTEROL SULFATE 90 UG/1
2 INHALANT RESPIRATORY (INHALATION) EVERY 4 HOURS PRN
Qty: 8.5 G | Refills: 4 | Status: SHIPPED | OUTPATIENT
Start: 2025-03-13

## 2025-03-21 ENCOUNTER — VIRTUAL VISIT (OUTPATIENT)
Dept: FAMILY MEDICINE | Facility: CLINIC | Age: 35
End: 2025-03-21
Payer: COMMERCIAL

## 2025-03-21 DIAGNOSIS — F90.2 ATTENTION DEFICIT HYPERACTIVITY DISORDER (ADHD), COMBINED TYPE: Primary | ICD-10-CM

## 2025-03-21 DIAGNOSIS — J45.40 MODERATE PERSISTENT ASTHMA WITHOUT COMPLICATION: ICD-10-CM

## 2025-03-21 PROCEDURE — 98006 SYNCH AUDIO-VIDEO EST MOD 30: CPT | Performed by: NURSE PRACTITIONER

## 2025-03-21 RX ORDER — FLUTICASONE PROPIONATE AND SALMETEROL XINAFOATE 115; 21 UG/1; UG/1
2 AEROSOL, METERED RESPIRATORY (INHALATION) 2 TIMES DAILY
Qty: 36 G | Refills: 11 | Status: SHIPPED | OUTPATIENT
Start: 2025-03-21 | End: 2025-03-21

## 2025-03-21 RX ORDER — ALBUTEROL SULFATE 0.83 MG/ML
2.5 SOLUTION RESPIRATORY (INHALATION) EVERY 6 HOURS PRN
Qty: 90 ML | Refills: 1 | Status: SHIPPED | OUTPATIENT
Start: 2025-03-21

## 2025-03-21 RX ORDER — LISDEXAMFETAMINE DIMESYLATE 20 MG/1
20 CAPSULE ORAL DAILY
Qty: 30 CAPSULE | Refills: 0 | Status: SHIPPED | OUTPATIENT
Start: 2025-05-20 | End: 2025-06-19

## 2025-03-21 RX ORDER — PREDNISONE 20 MG/1
40 TABLET ORAL DAILY
Qty: 10 TABLET | Refills: 0 | Status: SHIPPED | OUTPATIENT
Start: 2025-03-21 | End: 2025-03-26

## 2025-03-21 RX ORDER — LISDEXAMFETAMINE DIMESYLATE 20 MG/1
20 CAPSULE ORAL DAILY
Qty: 30 CAPSULE | Refills: 0 | Status: SHIPPED | OUTPATIENT
Start: 2025-04-20 | End: 2025-05-20

## 2025-03-21 RX ORDER — LISDEXAMFETAMINE DIMESYLATE 20 MG/1
20 CAPSULE ORAL DAILY
Qty: 30 CAPSULE | Refills: 0 | Status: SHIPPED | OUTPATIENT
Start: 2025-03-21 | End: 2025-04-20

## 2025-03-21 ASSESSMENT — ANXIETY QUESTIONNAIRES
GAD7 TOTAL SCORE: 6
7. FEELING AFRAID AS IF SOMETHING AWFUL MIGHT HAPPEN: NOT AT ALL
6. BECOMING EASILY ANNOYED OR IRRITABLE: MORE THAN HALF THE DAYS
7. FEELING AFRAID AS IF SOMETHING AWFUL MIGHT HAPPEN: NOT AT ALL
1. FEELING NERVOUS, ANXIOUS, OR ON EDGE: SEVERAL DAYS
3. WORRYING TOO MUCH ABOUT DIFFERENT THINGS: SEVERAL DAYS
GAD7 TOTAL SCORE: 6
2. NOT BEING ABLE TO STOP OR CONTROL WORRYING: SEVERAL DAYS
5. BEING SO RESTLESS THAT IT IS HARD TO SIT STILL: NOT AT ALL
4. TROUBLE RELAXING: SEVERAL DAYS
8. IF YOU CHECKED OFF ANY PROBLEMS, HOW DIFFICULT HAVE THESE MADE IT FOR YOU TO DO YOUR WORK, TAKE CARE OF THINGS AT HOME, OR GET ALONG WITH OTHER PEOPLE?: SOMEWHAT DIFFICULT
GAD7 TOTAL SCORE: 6
IF YOU CHECKED OFF ANY PROBLEMS ON THIS QUESTIONNAIRE, HOW DIFFICULT HAVE THESE PROBLEMS MADE IT FOR YOU TO DO YOUR WORK, TAKE CARE OF THINGS AT HOME, OR GET ALONG WITH OTHER PEOPLE: SOMEWHAT DIFFICULT

## 2025-03-21 ASSESSMENT — PATIENT HEALTH QUESTIONNAIRE - PHQ9
SUM OF ALL RESPONSES TO PHQ QUESTIONS 1-9: 5
SUM OF ALL RESPONSES TO PHQ QUESTIONS 1-9: 5
10. IF YOU CHECKED OFF ANY PROBLEMS, HOW DIFFICULT HAVE THESE PROBLEMS MADE IT FOR YOU TO DO YOUR WORK, TAKE CARE OF THINGS AT HOME, OR GET ALONG WITH OTHER PEOPLE: SOMEWHAT DIFFICULT

## 2025-03-21 NOTE — PROGRESS NOTES
Godfrey is a 34 year old who is being evaluated via a billable video visit.    How would you like to obtain your AVS? MyChart  If the video visit is dropped, the invitation should be resent by: Text to cell phone: [phone]  Will anyone else be joining your video visit? No      Assessment & Plan     Moderate persistent asthma without complication  Treat exacerbation. Follow up if ongoing.   - predniSONE (DELTASONE) 20 MG tablet  Dispense: 10 tablet; Refill: 0  - Nebulizer and Supplies Order for DME - ONLY FOR DME  - albuterol (PROVENTIL) (2.5 MG/3ML) 0.083% neb solution  Dispense: 90 mL; Refill: 1    ADHD  Stable on medication. Refills provided.  - lisdexamfetamine (VYVANSE) 20 MG capsule  Dispense: 30 capsule; Refill: 0  - lisdexamfetamine (VYVANSE) 20 MG capsule  Dispense: 30 capsule; Refill: 0  - lisdexamfetamine (VYVANSE) 20 MG capsule  Dispense: 30 capsule; Refill: 0            BMI  Estimated body mass index is 26.35 kg/m  as calculated from the following:    Height as of 7/26/24: 1.829 m (6').    Weight as of 7/26/24: 88.1 kg (194 lb 4.8 oz).   Weight management plan: Discussed healthy diet and exercise guidelines      See Patient Instructions    Subjective   Godfrey is a 34 year old, presenting for the following health issues:  Recheck Medication      3/21/2025     4:24 PM   Additional Questions   Roomed by Nicky CASANOVA   Accompanied by Self     History of Present Illness       He eats 0-1 servings of fruits and vegetables daily.He consumes 2 sweetened beverage(s) daily.         Medication Followup of Albuterol   Taking Medication as prescribed: yes  Side Effects:  None  Medication Helping Symptoms:  yes  Patient requesting steroid to help breathing. Patient used prednisone in the past which helped     Was sick with viral illness for about 5 days about a month ago. Wheezing with persistent cough. Some SOB ongoing since this illness.         Objective           Vitals:  No vitals were obtained today due to virtual  visit.    Physical Exam   GENERAL: alert and no distress  EYES: Eyes grossly normal to inspection.  No discharge or erythema, or obvious scleral/conjunctival abnormalities.  RESP: No audible wheeze, cough, or visible cyanosis.    SKIN: Visible skin clear. No significant rash, abnormal pigmentation or lesions.  NEURO: Cranial nerves grossly intact.  Mentation and speech appropriate for age.  PSYCH: Appropriate affect, tone, and pace of words          Video-Visit Details    Type of service:  Video Visit   Originating Location (pt. Location): Home    Distant Location (provider location):  On-site  Platform used for Video Visit: Daniel  Signed Electronically by: Gely Bedoya, CNP

## 2025-05-04 ENCOUNTER — DOCUMENTATION ONLY (OUTPATIENT)
Dept: PSYCHOLOGY | Facility: CLINIC | Age: 35
End: 2025-05-04
Payer: COMMERCIAL

## 2025-05-04 NOTE — PROGRESS NOTES
Discharge Summary  Multiple Sessions    Client Name: Godfrey Porras MRN#: [id number] YOB: 1990      Intake / Discharge Date:   Intake: 5/31/23  Last Seen: 11/15/24  Discharge: 5/4/25      DSM5 Diagnoses: (Sustained by DSM5 Criteria Listed Above)  Diagnoses: 296.32 (F33.1) Major Depressive Disorder, Recurrent Episode, Moderate With anxious distress  Psychosocial & Contextual Factors: stress within important roles, stress in important relationships, financial stress  [id number]: 25  Presenting Concern:  Pt sought out therpy for concerns around depression and anxiety.      Reason for Discharge:  Client is satisfied with progress and Client did not return      Disposition at Time of Last Encounter:   Comments:   Pt appeared to be engaged and working on the goals he has set for himself. Pt was internally motivated for change and had made significant progress on goals.     Risk Management:   Client has had a history of suicidal ideation: passive SI. See [place].  A safety and risk management plan has been developed including: Patient consented to co-developed safety plan on 6/20/23.  Safety and risk management plan was reviewed.   Patient agreed to use safety plan should any safety concerns arise.  A copy was made available to the patient.      Referred To:  No referrals requested before or during discharge.        Jeannette Gilliam, Psychotherapist Trainee, [facility]  5/04/25

## 2025-06-26 ENCOUNTER — PATIENT OUTREACH (OUTPATIENT)
Dept: CARE COORDINATION | Facility: CLINIC | Age: 35
End: 2025-06-26
Payer: COMMERCIAL

## 2025-07-10 ENCOUNTER — PATIENT OUTREACH (OUTPATIENT)
Dept: CARE COORDINATION | Facility: CLINIC | Age: 35
End: 2025-07-10
Payer: COMMERCIAL

## (undated) DEVICE — DECANTER VIAL 2006S

## (undated) DEVICE — PREP POVIDONE-IODINE 10% SOLUTION 4OZ BOTTLE MDS093944

## (undated) DEVICE — SU VICRYL 3-0 SH 27" UND J416H

## (undated) DEVICE — TOOTHBRUSH ADULT NON STERILE MDS136850

## (undated) DEVICE — DRILL BIT SYN 1.5X125MM W/08MM STOP J-LATCH

## (undated) DEVICE — SOL WATER IRRIG 1000ML BOTTLE 2F7114

## (undated) DEVICE — BUR STRK EGG 4.0X44.5MM 10 FLUTE 1607-002-035

## (undated) DEVICE — SU SILK 2-0 TIE 12X30" A305H

## (undated) DEVICE — PACK NEURO MINOR UMMC SNE32MNMU4

## (undated) DEVICE — DRAPE POUCH INSTRUMENT 1018

## (undated) DEVICE — SOL NACL 0.9% IRRIG 1000ML BOTTLE 2F7124

## (undated) DEVICE — PREP SKIN SCRUB TRAY 4461A

## (undated) DEVICE — ESU GROUND PAD ADULT W/CORD E7507

## (undated) DEVICE — STRAP UNIVERSAL POSITIONING 2-PIECE 4X47X76" 91-287

## (undated) DEVICE — ESU NDL COLORADO MICRO E1651

## (undated) DEVICE — SU PLAIN FAST ABSORB 5-0 PC-1 18" 1915G

## (undated) DEVICE — DRSG JAWBRA  95

## (undated) DEVICE — IMP SCR SYN 2.0X12MM SELF TAP
Type: IMPLANTABLE DEVICE | Site: MANDIBLE | Status: NON-FUNCTIONAL
Removed: 2023-10-16

## (undated) DEVICE — PREP POVIDONE-IODINE 7.5% SCRUB 4OZ BOTTLE MDS093945

## (undated) DEVICE — LINEN TOWEL PACK X5 5464

## (undated) DEVICE — IMP SCR SYN 2.0X08MM SELF TAP
Type: IMPLANTABLE DEVICE | Site: MANDIBLE | Status: NON-FUNCTIONAL
Removed: 2023-10-16

## (undated) DEVICE — GLOVE BIOGEL PI SZ 8.5 40885

## (undated) DEVICE — SU CHROMIC 3-0 FS-2 27" 636

## (undated) DEVICE — SYRINGE EAR/ULCER STERILE 2 OZ BULB 4172

## (undated) DEVICE — LINEN TOWEL PACK X6 WHITE 5487

## (undated) DEVICE — PAD CHUX UNDERPAD 23X24" 7136

## (undated) DEVICE — BUR STRK SIDE CUT 1.6X5.1X44.8MM CARBIDE 6FLUTE 1607-002-107

## (undated) DEVICE — SUCTION MANIFOLD NEPTUNE 2 SYS 4 PORT 0702-020-000

## (undated) RX ORDER — FENTANYL CITRATE 50 UG/ML
INJECTION, SOLUTION INTRAMUSCULAR; INTRAVENOUS
Status: DISPENSED
Start: 2023-10-16

## (undated) RX ORDER — CHLORHEXIDINE GLUCONATE ORAL RINSE 1.2 MG/ML
SOLUTION DENTAL
Status: DISPENSED
Start: 2023-10-16

## (undated) RX ORDER — GLYCOPYRROLATE 0.2 MG/ML
INJECTION, SOLUTION INTRAMUSCULAR; INTRAVENOUS
Status: DISPENSED
Start: 2023-10-16

## (undated) RX ORDER — KETOROLAC TROMETHAMINE 30 MG/ML
INJECTION, SOLUTION INTRAMUSCULAR; INTRAVENOUS
Status: DISPENSED
Start: 2023-10-16

## (undated) RX ORDER — CEFAZOLIN SODIUM 1 G/3ML
INJECTION, POWDER, FOR SOLUTION INTRAMUSCULAR; INTRAVENOUS
Status: DISPENSED
Start: 2023-10-16

## (undated) RX ORDER — FENTANYL CITRATE-0.9 % NACL/PF 10 MCG/ML
PLASTIC BAG, INJECTION (ML) INTRAVENOUS
Status: DISPENSED
Start: 2023-10-16

## (undated) RX ORDER — LABETALOL HYDROCHLORIDE 5 MG/ML
INJECTION, SOLUTION INTRAVENOUS
Status: DISPENSED
Start: 2023-10-16

## (undated) RX ORDER — HYDROMORPHONE HCL IN WATER/PF 6 MG/30 ML
PATIENT CONTROLLED ANALGESIA SYRINGE INTRAVENOUS
Status: DISPENSED
Start: 2023-10-16

## (undated) RX ORDER — SODIUM CHLORIDE 9 MG/ML
INJECTION, SOLUTION INTRAVENOUS
Status: DISPENSED
Start: 2023-10-16

## (undated) RX ORDER — PROPOFOL 10 MG/ML
INJECTION, EMULSION INTRAVENOUS
Status: DISPENSED
Start: 2023-10-16

## (undated) RX ORDER — OXYMETAZOLINE HYDROCHLORIDE 0.05 G/100ML
SPRAY NASAL
Status: DISPENSED
Start: 2023-10-16

## (undated) RX ORDER — ONDANSETRON 2 MG/ML
INJECTION INTRAMUSCULAR; INTRAVENOUS
Status: DISPENSED
Start: 2023-10-16

## (undated) RX ORDER — ACETAMINOPHEN 325 MG/1
TABLET ORAL
Status: DISPENSED
Start: 2023-10-16

## (undated) RX ORDER — DEXAMETHASONE SODIUM PHOSPHATE 4 MG/ML
INJECTION, SOLUTION INTRA-ARTICULAR; INTRALESIONAL; INTRAMUSCULAR; INTRAVENOUS; SOFT TISSUE
Status: DISPENSED
Start: 2023-10-16